# Patient Record
Sex: FEMALE | Race: WHITE | Employment: OTHER | URBAN - METROPOLITAN AREA
[De-identification: names, ages, dates, MRNs, and addresses within clinical notes are randomized per-mention and may not be internally consistent; named-entity substitution may affect disease eponyms.]

---

## 2018-05-01 ENCOUNTER — NURSING HOME VISIT (OUTPATIENT)
Dept: GERIATRICS | Facility: CLINIC | Age: 83
End: 2018-05-01
Payer: COMMERCIAL

## 2018-05-01 VITALS
TEMPERATURE: 98.6 F | WEIGHT: 103.2 LBS | BODY MASS INDEX: 18.99 KG/M2 | HEIGHT: 62 IN | HEART RATE: 83 BPM | OXYGEN SATURATION: 99 % | RESPIRATION RATE: 19 BRPM | DIASTOLIC BLOOD PRESSURE: 69 MMHG | SYSTOLIC BLOOD PRESSURE: 153 MMHG

## 2018-05-01 DIAGNOSIS — I27.23 PULMONARY HYPERTENSION DUE TO CHRONIC OBSTRUCTIVE PULMONARY DISEASE (H): ICD-10-CM

## 2018-05-01 DIAGNOSIS — I10 BENIGN ESSENTIAL HTN: ICD-10-CM

## 2018-05-01 DIAGNOSIS — F41.9 ANXIETY: ICD-10-CM

## 2018-05-01 DIAGNOSIS — R41.89 COGNITIVE IMPAIRMENT: ICD-10-CM

## 2018-05-01 DIAGNOSIS — J44.9 CHRONIC OBSTRUCTIVE PULMONARY DISEASE, UNSPECIFIED COPD TYPE (H): Primary | ICD-10-CM

## 2018-05-01 DIAGNOSIS — J44.9 PULMONARY HYPERTENSION DUE TO CHRONIC OBSTRUCTIVE PULMONARY DISEASE (H): ICD-10-CM

## 2018-05-01 DIAGNOSIS — I50.32 CHRONIC DIASTOLIC CONGESTIVE HEART FAILURE (H): ICD-10-CM

## 2018-05-01 DIAGNOSIS — R53.81 PHYSICAL DECONDITIONING: ICD-10-CM

## 2018-05-01 PROBLEM — R06.00 DYSPNEA AND RESPIRATORY ABNORMALITIES: Status: ACTIVE | Noted: 2018-04-18

## 2018-05-01 PROBLEM — R79.89 ELEVATED BRAIN NATRIURETIC PEPTIDE (BNP) LEVEL: Status: ACTIVE | Noted: 2018-04-02

## 2018-05-01 PROBLEM — E87.1 HYPONATREMIA: Status: ACTIVE | Noted: 2018-04-16

## 2018-05-01 PROBLEM — I50.33 ACUTE ON CHRONIC DIASTOLIC CHF (CONGESTIVE HEART FAILURE) (H): Status: ACTIVE | Noted: 2018-04-27

## 2018-05-01 PROBLEM — E55.9 VITAMIN D DEFICIENCY: Status: ACTIVE | Noted: 2017-12-06

## 2018-05-01 PROBLEM — J96.21 ACUTE ON CHRONIC RESPIRATORY FAILURE WITH HYPOXEMIA (H): Status: ACTIVE | Noted: 2018-04-27

## 2018-05-01 PROBLEM — R79.89 SERUM CREATININE RAISED: Status: ACTIVE | Noted: 2018-04-02

## 2018-05-01 PROBLEM — R06.89 DYSPNEA AND RESPIRATORY ABNORMALITIES: Status: ACTIVE | Noted: 2018-04-18

## 2018-05-01 PROCEDURE — 99310 SBSQ NF CARE HIGH MDM 45: CPT | Performed by: NURSE PRACTITIONER

## 2018-05-01 RX ORDER — TIOTROPIUM BROMIDE 18 UG/1
18 CAPSULE ORAL; RESPIRATORY (INHALATION) DAILY
COMMUNITY

## 2018-05-01 RX ORDER — BUDESONIDE 0.5 MG/2ML
21 INHALANT ORAL 2 TIMES DAILY
COMMUNITY
End: 2018-05-02

## 2018-05-01 RX ORDER — ALBUTEROL SULFATE 90 UG/1
2 AEROSOL, METERED RESPIRATORY (INHALATION) EVERY 4 HOURS PRN
COMMUNITY

## 2018-05-01 RX ORDER — POTASSIUM CHLORIDE 750 MG/1
10 TABLET, EXTENDED RELEASE ORAL DAILY
COMMUNITY
End: 2019-08-04

## 2018-05-03 ENCOUNTER — TRANSFERRED RECORDS (OUTPATIENT)
Dept: HEALTH INFORMATION MANAGEMENT | Facility: CLINIC | Age: 83
End: 2018-05-03

## 2018-05-03 LAB
ANION GAP SERPL CALCULATED.3IONS-SCNC: 9 MMOL/L (ref 3–14)
BUN SERPL-MCNC: 15 MG/DL (ref 7–30)
CALCIUM SERPL-MCNC: 9.3 MG/DL (ref 8.5–10.1)
CHLORIDE SERPLBLD-SCNC: 88 MMOL/L (ref 94–109)
CO2 SERPL-SCNC: 30 MMOL/L (ref 20–32)
CREAT SERPL-MCNC: 1.1 MG/DL (ref 0.52–1.04)
ERYTHROCYTE [DISTWIDTH] IN BLOOD BY AUTOMATED COUNT: 12.7 % (ref 10–15)
GFR SERPL CREATININE-BSD FRML MDRD: 47 ML/MIN/1.7M2
GLUCOSE SERPL-MCNC: 90 MG/DL (ref 70–99)
HCT VFR BLD AUTO: 34.3 % (ref 35–47)
HEMOGLOBIN: 11.3 G/DL (ref 11.7–15.7)
MCH RBC QN AUTO: 28.6 PG (ref 26.5–33)
MCHC RBC AUTO-ENTMCNC: 32.9 G/DL (ref 31.5–36.5)
MCV RBC AUTO: 87 FL (ref 78–100)
PLATELET # BLD AUTO: 267 10E9/L (ref 150–450)
POTASSIUM SERPL-SCNC: 4.3 MMOL/L (ref 3.4–5.3)
RBC # BLD AUTO: 3.95 10E12/L (ref 3.8–5.2)
SODIUM SERPL-SCNC: 127 MMOL/L (ref 133–144)
WBC # BLD AUTO: 10.8 10E9/L (ref 4–11)

## 2018-05-04 ENCOUNTER — NURSING HOME VISIT (OUTPATIENT)
Dept: GERIATRICS | Facility: CLINIC | Age: 83
End: 2018-05-04
Payer: COMMERCIAL

## 2018-05-04 VITALS
HEART RATE: 80 BPM | OXYGEN SATURATION: 94 % | DIASTOLIC BLOOD PRESSURE: 77 MMHG | TEMPERATURE: 99.1 F | RESPIRATION RATE: 18 BRPM | SYSTOLIC BLOOD PRESSURE: 147 MMHG

## 2018-05-04 DIAGNOSIS — R53.81 PHYSICAL DECONDITIONING: Primary | ICD-10-CM

## 2018-05-04 DIAGNOSIS — I10 BENIGN ESSENTIAL HTN: ICD-10-CM

## 2018-05-04 DIAGNOSIS — J44.9 CHRONIC OBSTRUCTIVE PULMONARY DISEASE, UNSPECIFIED COPD TYPE (H): ICD-10-CM

## 2018-05-04 DIAGNOSIS — I48.20 CHRONIC ATRIAL FIBRILLATION (H): ICD-10-CM

## 2018-05-04 DIAGNOSIS — I50.32 CHRONIC DIASTOLIC CONGESTIVE HEART FAILURE (H): ICD-10-CM

## 2018-05-04 DIAGNOSIS — R41.89 COGNITIVE IMPAIRMENT: ICD-10-CM

## 2018-05-04 PROCEDURE — 99305 1ST NF CARE MODERATE MDM 35: CPT | Performed by: INTERNAL MEDICINE

## 2018-05-04 NOTE — MR AVS SNAPSHOT
"              After Visit Summary   5/4/2018    Melissa Alfaro    MRN: [id number]           Patient Information     Date Of Birth          12/3/1933        Visit Information        Provider Department      5/4/2018 6:00 AM Rom Humphreys MD [facility]        Today's Diagnoses     Physical deconditioning    -  1    Chronic diastolic congestive heart failure (H)        Chronic obstructive pulmonary disease, unspecified COPD type (H)        Chronic atrial fibrillation (H)        Benign essential HTN        Cognitive impairment           Follow-ups after your visit        Who to contact     If you have questions or need follow up information about today's clinic visit or your schedule please contact [facility] directly at [phone].  Normal or non-critical lab and imaging results will be communicated to you by MyChart, letter or phone within 4 business days after the clinic has received the results. If you do not hear from us within 7 days, please contact the clinic through MyChart or phone. If you have a critical or abnormal lab result, we will notify you by phone as soon as possible.  Submit refill requests through [employer] or call your pharmacy and they will forward the refill request to us. Please allow 3 business days for your refill to be completed.          Additional Information About Your Visit        MyChart Information     [employer] lets you send messages to your doctor, view your test results, renew your prescriptions, schedule appointments and more. To sign up, go to [email or web address] . Click on \"Log in\" on the left side of the screen, which will take you to the Welcome page. Then click on \"Sign up Now\" on the right side of the page.     You will be asked to enter the access code listed below, as well as some personal information. Please follow the directions to create your username and password.     Your access code is: [email or web address]  Expires: 8/2/2018 10:45 " AM     Your access code will  in 90 days. If you need help or a new code, please call your [facility] clinic or [phone].        Care EveryWhere ID     This is your Care EveryWhere ID. This could be used by other organizations to access your [facility] medical records  [id number]        Your Vitals Were     Pulse Temperature Respirations Pulse Oximetry          80 99.1  F (37.3  C) 18 94%         Blood Pressure from Last 3 Encounters:   18 147/77   18 153/69    Weight from Last 3 Encounters:   18 103 lb 3.2 oz (46.8 kg)              Today, you had the following     No orders found for display       Primary Care Provider Office Phone # Fax #    Sudheer Tariq [phone]       [address] [address]        Equal Access to Services     JOEY MURRELL : Hadii aad ku hadasho Soomaali, waaxda luqadaha, qaybta kaalmada adeegyada, bradley ruffinin hayaan avtar pascal . So [facility] [phone].    ATENCIÓN: Si habla español, tiene a rene disposición servicios gratuitos de asistencia lingüística. Llame al [phone].    We comply with applicable federal civil rights laws and [place] laws. We do not discriminate on the basis of race, color, national origin, age, disability, sex, sexual orientation, or gender identity.            Thank you!     Thank you for choosing [facility]  for your care. Our goal is always to provide you with excellent care. Hearing back from our patients is one way we can continue to improve our services. Please take a few minutes to complete the written survey that you may receive in the mail after your visit with us. Thank you!             Your Updated Medication List - Protect others around you: Learn how to safely use, store and throw away your medicines at www.disposemymeds.org.          This list is accurate as of 18 10:45 AM.  Always use your most recent med list.                   Brand Name  Dispense Instructions for use Diagnosis    ACETAMINOPHEN PO      Take 325-650 mg by mouth every 6 hours as needed for pain        albuterol 108 (90 Base) MCG/ACT Inhaler    PROAIR HFA/PROVENTIL HFA/VENTOLIN HFA     Inhale 1-2 puffs into the lungs every 4 hours as needed for shortness of breath / dyspnea or wheezing        AMLODIPINE BESYLATE PO      Take 10 mg by mouth daily        ATENOLOL PO      Take 50 mg by mouth daily        ATIVAN PO      Take 0.5 mg by mouth 2 times daily as needed for anxiety        FUROSEMIDE PO      Take 20 mg by mouth daily        potassium chloride 10 MEQ tablet    K-TAB,KLOR-CON     Take 10 mEq by mouth daily        tiotropium 18 MCG capsule    SPIRIVA     Inhale 18 mcg into the lungs daily

## 2018-05-04 NOTE — PROGRESS NOTES
PRIMARY CARE PROVIDER AND CLINIC RESPONSIBLE:  Sudheer Tariq, [facility] [address] [facility]*        ADMISSION HISTORY AND PHYSICAL EXAMINATION     Chief Complaint   Patient presents with     Fatigue     Shortness of Breath         HISTORY OF PRESENT ILLNESS:  84 year old female, (12/3/1933), [facility] of medical care and rehab.  HPI information obtained from: facility chart records, facility staff, patient report, [facility] chart review and Care Everywhere [facility] chart review.    Melissa Alfaro is a 84 year old female with history of hypertension, chronic atrial fibrillation, history of chronic diastolic congestive heart failure, COPD, hyperlipidemia, sleep pulmonary hypertension, anxiety, cognitive impairment, anxiety and panic attacks who was admitted at [facility] from 4/27 to 4/28/18 due to shortness of breath.  She was recently admitted at the same hospital from 4/16 through 4/18/2018 for COPD and CHF exacerbation.  She was treated with diuretics, oxygen, steroids, and other home medications.  Echo on 4/17/2018 showed EF 65-70%, with severe diastolic dysfunction, moderate severe left atrial enlargement, mild mitral valve regurgitation.  She was discharged with home oxygen use at night due to hypoxia.  At home she was short of breath and anxious as she was not using her Pulmicort and restarted her Spiriva.  Patient recently forget fullness as per daughter.  Patient lives alone.  She feels weak and tired, she is short of breath on exertion.  She is using oxygen 24 7.  Has no leg swelling.  She denies chest pains. Slept well, appetite good and had BM. Patient denies chest pain, abdominal pain, n&v, fever, chills, dysuria, leg pain or swelling. The rest of ROS is unremarkable. Patient is seen and examined by me with José Miguel Valdez NP. Please see SAMSON Valdez's admit noted dated 5/1/18 for details of admission, past medical history,  family history, allergies, medication list, social history and other details pertinent with this admission. Hospital admission and dc summary reviewed.    Past Medical History:   Diagnosis Date     Anxiety      Benign neoplasm of colon      Carotid stenosis, asymptomatic      COPD (chronic obstructive pulmonary disease) (H)      Corns and callosities      Dermatophytosis of nail      Essential hypertension      History of atrial fibrillation      Hyperlipidemia      Hyperlipidemia      Insomnia      Irritable bowel syndrome      Nocturia      Plantar fascial fibromatosis      Primary localized osteoarthrosis of the hip      Pulmonary hypertension      Sprain of lumbar region      Vitamin D deficiency      VSD (ventricular septal defect and aortic arch hypoplasia        Past Surgical History:   Procedure Laterality Date     APPENDECTOMY       AS TOTAL HIP ARTHROPLASTY         Current Outpatient Prescriptions   Medication Sig     ACETAMINOPHEN PO Take 325-650 mg by mouth every 6 hours as needed for pain     albuterol (PROAIR HFA/PROVENTIL HFA/VENTOLIN HFA) 108 (90 Base) MCG/ACT Inhaler Inhale 1-2 puffs into the lungs every 4 hours as needed for shortness of breath / dyspnea or wheezing     AMLODIPINE BESYLATE PO Take 10 mg by mouth daily     ATENOLOL PO Take 50 mg by mouth daily     FUROSEMIDE PO Take 20 mg by mouth daily     LORazepam (ATIVAN PO) Take 0.5 mg by mouth 2 times daily as needed for anxiety     potassium chloride (K-TAB,KLOR-CON) 10 MEQ tablet Take 10 mEq by mouth daily     tiotropium (SPIRIVA) 18 MCG capsule Inhale 18 mcg into the lungs daily      No current facility-administered medications for this visit.        Allergies   Allergen Reactions     No Clinical Screening - See Comments Shortness Of Breath     With some mold spores  Other reaction(s): Other  Heart races with antihistamines     Diphtheria-Tetanus Toxoids      Other reaction(s): Other  Red swollen arm     Epinephrine      Other reaction(s):  Other  Heart rate increases & her body shakes & trembles     Penicillin G      Other reaction(s): Other (Specify in Comments)       Social History     Social History     Marital status: Single     Spouse name: N/A     Number of children: N/A     Years of education: N/A     Occupational History     Not on file.     Social History Main Topics     Smoking status: Not on file     Smokeless tobacco: Not on file     Alcohol use Not on file     Drug use: Not on file     Sexual activity: Not on file     Other Topics Concern     Not on file     Social History Narrative     No narrative on file          Information reviewed:  Medications, vital signs, orders, nursing notes, problem list, hospital information.     ROS: All 10 point review of system completed, those pertinent positive, please see H&P, the remaining ROS is negative.    /77  Pulse 80  Temp 99.1  F (37.3  C)  Resp 18  SpO2 94%    PHYSICAL EXAMINATION:   GENERAL:  No acute distress.  SKIN:  Dry and warm.  There is no rash at area of skin examined.  HEENT:  Head without trauma.  Pupils round, reactive. Exam of conjunctiva and lids are normal. Sclera without icterus. There is no oral thrush.  NECK:  Supple. No jugular venous distension.  CHEST: No reproducible chest tenderness.   LUNGS:  Normal respiratory effort. Lungs reveal decreased breath sounds at bases. No rales but few ronchi and wheezes.  HEART:  Regular rate and rhythm.  No murmur, gallops or rubs auscultated.  ABDOMEN:  Soft, bowel sounds positive.  There is no tenderness or guarding.   EXTREMITIES: No edema.   NEUROLOGIC:  Alert and oriented x3. Moving all ext. Gait not tested.  PSYCH: Recent and remote memory is normal. Mood and affect are normal.    Lab/Diagnostic data:  Reviewed    CBC Lab Results (Last 5 results in 365 days)       WBC RBC Hgb Hct MCV MCH MCHC RDW Plt Neut # Lymph # Eos # Baso # Immat. Gran #   05/03/18 0000 10.8 3.95 11.3 34.3 87 28.6 32.9 12.7 267 -- -- -- -- --   CMP Labs  (Last 5 results in 365 days)       Na+ K+ Cl CO2 ANION GAP Glc BUN Creat GFR GFR-Black Calcium Mg ALT AST A1C TSH LDL HIV   05/03/18 0000 127 4.3 88 30 9 90 15 1.10 47 57               [facility] labs:  CHEMISTRY 8 TEST PANEL (04/28/2018 6:45 AM)  Only the most recent of 2 results within the time period is included.         CHEMISTRY 8 TEST PANEL (04/28/2018 6:45 AM)   Component Value Ref Range   GLUCOSE 89 70 - 105 MG/DL   BLOOD UREA NITROGEN 14.0 7 - 26 MG/DL   CREATININE 1.08 (H) 0.55 - 1.02 MG/DL   GFR, EST (OTHER RACES) 48 (L)  Comment:   REFERENCE RANGE:  NORMAL  >60 ml/min/1.73m`2  GFR REFERENCE RANGE IS NOT ESTABLISHED IN PATIENTS >70 YRS    >60   GFR,EST() 58 (L)  Comment:   REFERENCE RANGE:  NORMAL  >60 ml/min/1.73m`2  GFR REFERENCE RANGE IS NOT ESTABLISHED IN PATIENTS >70 YRS    >60   BUN/CREAT RATIO 13.0 10 - 30   CO2 32.0 (H) 20 - 31 MMOL/L   CHLORIDE 93 (L) 98 - 107 MMOL/L   SODIUM 135 (L) 136 - 145 MMOL/L   POTASSIUM 4.0 3.5 - 5.1 MMOL/L   CALCIUM 9.3 8.4 - 10.2 MG/DL   ANION GAP 14.0 6 - 14   CALCULATED OSMO 279.9 278 - 308 MOSM/KG         CBC WITH DIFFERENTIAL AND PLATELET COUNT (04/27/2018 8:23 AM)       CBC WITH DIFFERENTIAL AND PLATELET COUNT (04/27/2018 8:23 AM)   Component Value Ref Range   WBC COUNT 10.0 4.5 - 11.0 K/UL   RBC COUNT 4.04 4.00 - 5.20 M/UL   HEMOGLOBIN 11.8 (L) 12.0 - 16.0 GM/DL   HEMATOCRIT 35.0 33 - 51 %   MCV 86.6 80 - 100 FL   MCH 29.2 26 - 34 PG   MCHC 33.7 32 - 36 %   RDW 12.7 11.5 - 13.1 %   PLATELETS 347 150 - 450 K/UL   MPV 8.4 6.5 - 10.0 FL   DIFF TYPE AUTOMATED DIFFERENTIAL      % NEUTRO 76.2 35 - 77 %   % LYMPHS 8.1 (L) 24 - 44 %   % MONOCYTE 12.3 (H) 3 - 6 %   % EOS 2.5 0 - 3 %   % BASO 0.3 0 - 1 %   % IMMATURE GRAN 0.6 0.0 - 1.1 %   ABS NEUTROPHIL (ANC) 7.6 1.5 - 8.5 K/UL   ABS LYMPH 0.8 (L) 1.1 - 5.5 K/UL   ABS MONOCYTE 1.2 (H) 0.1 - 0.7 K/UL   ABS EOS 0.3 0.0 - 0.3 K/UL   ABS BASO 0.0 0.0 - 0.1 K/UL   ABS IMMATURE GRAN 0.1 0.00 - 0.14 K/UL    IMMATURE PLATELET FRACTION 0.0 0.0 - 7.2 %   NRBC ABSOLUTE COUNT 0.00 0.00 - 0.72 K/UL   % NUCLEATED RBC 0.0 0 %          ASSESSMENT / PLAN:     Physical deconditioning  Plan: PT/OT with increase independence, self-care, strength and endurance and other cares that help return to home/facility of origin, fall precautions. Care conference with patient and family for the progress of rehab and disposition issues will be discussed as planned. Rehab evaluation and other evaluations including CPT are at rehab logs, to be reviewed separately.  Fall risk assessment as well as cognitive evaluation will be formed during rehab stay if indicated.    Chronic diastolic congestive heart failure (H)  Plan: continue current medications Lasix, atenolol, Norvasc, monitor I&O and daily weighs and labs if indicated. Follow up cardiologist and PCP as scheduled.    Chronic obstructive pulmonary disease, unspecified COPD type (H)  Plan: Continue Spiriva, oxygen and albuterol inhalers.  Consider starting corticosteroid inhalers if needed.  She is on Ativan for anxiety related to COPD.    Chronic atrial fibrillation (H)  Plan: Continue Tylenol.  She is not on anticoagulation or aspirin, consider starting aspirin 81 mg daily if no contraindication.    Benign essential HTN  Plan: Continue Tylenol and Norvasc with parameters. Blood pressure stable.    Cognitive impairment  Plan: Fall and delirium prevention.    Other problems with same care. Primary care doctor and other specialists to address those chronic problems in next clinic appointment to be scheduled upon discharge from the TCU.      Total time spent with patient visit was 35 min including patient visit, review of past records, patients counseling and coordinating care.        Rom Humphreys MD

## 2018-05-05 ENCOUNTER — TRANSFERRED RECORDS (OUTPATIENT)
Dept: HEALTH INFORMATION MANAGEMENT | Facility: CLINIC | Age: 83
End: 2018-05-05

## 2018-05-05 LAB
ANION GAP SERPL CALCULATED.3IONS-SCNC: 9 MMOL/L (ref 3–14)
BUN SERPL-MCNC: 28 MG/DL (ref 7–30)
CALCIUM SERPL-MCNC: 9 MG/DL (ref 8.5–10.1)
CHLORIDE SERPLBLD-SCNC: 89 MMOL/L (ref 94–109)
CO2 SERPL-SCNC: 29 MMOL/L (ref 20–32)
CREAT SERPL-MCNC: 1.25 MG/DL (ref 0.52–1.04)
GFR SERPL CREATININE-BSD FRML MDRD: 41 ML/MIN/1.73M2
GLUCOSE SERPL-MCNC: 108 MG/DL (ref 70–99)
POTASSIUM SERPL-SCNC: 4.4 MMOL/L (ref 3.4–5.3)
SODIUM SERPL-SCNC: 127 MMOL/L (ref 133–144)

## 2018-05-07 ENCOUNTER — TRANSFERRED RECORDS (OUTPATIENT)
Dept: HEALTH INFORMATION MANAGEMENT | Facility: CLINIC | Age: 83
End: 2018-05-07

## 2018-05-07 LAB
ANION GAP SERPL CALCULATED.3IONS-SCNC: 6 MMOL/L (ref 3–14)
BUN SERPL-MCNC: 16 MG/DL (ref 7–30)
CALCIUM SERPL-MCNC: 9 MG/DL (ref 8.5–10.1)
CHLORIDE SERPLBLD-SCNC: 94 MMOL/L (ref 94–109)
CO2 SERPL-SCNC: 31 MMOL/L (ref 20–32)
CREAT SERPL-MCNC: 1.26 MG/DL (ref 0.52–1.04)
GFR SERPL CREATININE-BSD FRML MDRD: 40 ML/MIN/1.73M2
GLUCOSE SERPL-MCNC: 77 MG/DL (ref 70–99)
POTASSIUM SERPL-SCNC: 4 MMOL/L (ref 3.4–5.3)
SODIUM SERPL-SCNC: 131 MMOL/L (ref 133–144)

## 2018-05-08 ENCOUNTER — NURSING HOME VISIT (OUTPATIENT)
Dept: GERIATRICS | Facility: CLINIC | Age: 83
End: 2018-05-08
Payer: COMMERCIAL

## 2018-05-08 VITALS
WEIGHT: 106 LBS | BODY MASS INDEX: 19.39 KG/M2 | SYSTOLIC BLOOD PRESSURE: 150 MMHG | DIASTOLIC BLOOD PRESSURE: 59 MMHG | HEART RATE: 87 BPM | OXYGEN SATURATION: 93 % | TEMPERATURE: 98.4 F | RESPIRATION RATE: 18 BRPM

## 2018-05-08 DIAGNOSIS — I50.32 CHRONIC DIASTOLIC CONGESTIVE HEART FAILURE (H): ICD-10-CM

## 2018-05-08 DIAGNOSIS — R53.81 PHYSICAL DECONDITIONING: ICD-10-CM

## 2018-05-08 DIAGNOSIS — Z86.79 HISTORY OF ATRIAL FIBRILLATION: ICD-10-CM

## 2018-05-08 DIAGNOSIS — I10 HYPERTENSION, BENIGN: ICD-10-CM

## 2018-05-08 DIAGNOSIS — R41.89 COGNITIVE IMPAIRMENT: ICD-10-CM

## 2018-05-08 DIAGNOSIS — E87.1 HYPONATREMIA: ICD-10-CM

## 2018-05-08 DIAGNOSIS — J44.9 CHRONIC OBSTRUCTIVE PULMONARY DISEASE, UNSPECIFIED COPD TYPE (H): Primary | ICD-10-CM

## 2018-05-08 PROCEDURE — 99309 SBSQ NF CARE MODERATE MDM 30: CPT | Performed by: NURSE PRACTITIONER

## 2018-05-08 NOTE — PROGRESS NOTES
[facility]    Chief Complaint   Patient presents with     RYDER       [facility] Medical Record Number:  [id number]    HPI:    Melissa Alfaro is a 84 year old  (12/3/1933), who [facility]  HPI information obtained from: facility chart records, facility staff, patient report, Care Everywhere Epic chart review.   She came to this facility 4/28/2018 for short term rehab and medical management following 2 recent hospitalizations.   She was initially hospitalized at [facility] 4/16-4/21/2018 with COPD and CHF exacerbation. ECHO 4/17/2018: EF 65-70%, severe diastolic dysfunction, moderate to severe left atrial enlargement, mild mitral valve regurgitation. She was diuresed. Discharged home on O2, which was new. She returned to the ED 4/27 with increased shortness of breath and inability to manage at home. She was hypoxic on room air. CXR showed small left pleural effusion, too small to tap. She was given lasix IV X 1. Pulmicort was started. She discharged to this facility to be closer to family.      Current issues are:          Chronic obstructive pulmonary disease, unspecified COPD type (H)-reports her breathing has improved and she's feeling better. Pulmicort was discontinued as it caused anxiety and she feels this was helpful.   Chronic diastolic congestive heart failure (H)-weight is up 3 lbs, but appetite improved. No LE edema  Hypertension-BPs: 150/59, 147/75, 139/61    History of atrial fibrillation-HR: 80-91  Hyponatremia-fluid restriction started 5/3/2018 for Na 127 with improvement  Cognitive impairment-she's more alert and interactive today. Speech therapist reports moderate deficits in memory, mild deficits in attention and problem solving.   The on call MD was called with increased confusion 5/6/2018. UA showed possible infection and Bactrim was started. UC showed no growth and Bactrim discontinued 5/7/2018. She denies  urinary symptoms.   Physical deconditioning-ambulating 250 ft with walker and supervision. Able to do 6 stairs with handrails and stand by assist. Requires supervision to stand by assist with cares.      ALLERGIES: No clinical screening - see comments; Diphtheria-tetanus toxoids; Epinephrine; and Penicillin g  Past Medical, Surgical, Family and Social History reviewed and updated in [facility].    Current Outpatient Prescriptions   Medication Sig Dispense Refill     ACETAMINOPHEN PO Take 325-650 mg by mouth every 6 hours as needed for pain       albuterol (PROAIR HFA/PROVENTIL HFA/VENTOLIN HFA) 108 (90 Base) MCG/ACT Inhaler Inhale 1-2 puffs into the lungs every 4 hours as needed for shortness of breath / dyspnea or wheezing       AMLODIPINE BESYLATE PO Take 10 mg by mouth daily       ASPIRIN EC PO Take 81 mg by mouth daily       ATENOLOL PO Take 50 mg by mouth daily       FUROSEMIDE PO Take 20 mg by mouth daily       LORazepam (ATIVAN PO) Take 0.5 mg by mouth 2 times daily as needed for anxiety       potassium chloride (K-TAB,KLOR-CON) 10 MEQ tablet Take 10 mEq by mouth daily       tiotropium (SPIRIVA) 18 MCG capsule Inhale 18 mcg into the lungs daily        Medications reviewed:  Medications reconciled to facility chart and changes were made to reflect current medications as identified as above med list. Below are the changes that were made:   Medications stopped since last EPIC medication reconciliation:   There are no discontinued medications.    Medications started since last [facility] medication reconciliation:  Orders Placed This Encounter   Medications     ASPIRIN EC PO     Sig: Take 81 mg by mouth daily       REVIEW OF SYSTEMS:  4 point ROS including Respiratory, CV, GI and , other than that noted in the HPI,  is negative    Physical Exam:  /59  Pulse 87  Temp 98.4  F (36.9  C)  Resp 18  Wt 106 lb (48.1 kg)  SpO2 93%  BMI 19.39 kg/m2  GENERAL APPEARANCE:  Alert, in no distress, thin  ENT:  Mouth and  posterior oropharynx normal, moist mucous membranes, [place]  EYES:  EOM normal, conjunctiva and lids normal  NECK:  No adenopathy,masses or thyromegaly  RESP:  respiratory effort and palpation of chest normal, no respiratory distress, diminished breath sounds bilaterally, no wheezes  CV:  Palpation and auscultation of heart done , regular rate and rhythm, no murmur, no edema, +2 pedal pulses  ABDOMEN:   soft, nontender, no hepatosplenomegaly or other masses  M/S:  Gait steady with walker.  ALCANTARA with good strength. No joint inflammation  SKIN:  no rashes or open areas  PSYCH:  oriented X3,  insight and judgement impaired, memory impaired, affect normal    Recent Labs:   Last Basic Metabolic Panel:  Lab Results   Component Value Date     05/07/2018      Lab Results   Component Value Date    POTASSIUM 4.0 05/07/2018     Lab Results   Component Value Date    CHLORIDE 94 05/07/2018     Lab Results   Component Value Date    LUIS ENRIQUE 9.0 05/07/2018     Lab Results   Component Value Date    CO2 31 05/07/2018     Lab Results   Component Value Date    BUN 16 05/07/2018     Lab Results   Component Value Date    CR 1.26 05/07/2018     Lab Results   Component Value Date    GLC 77 05/07/2018     Lab Results   Component Value Date    WBC 10.8 05/03/2018     Lab Results   Component Value Date    RBC 3.95 05/03/2018     Lab Results   Component Value Date    HGB 11.3 05/03/2018     Lab Results   Component Value Date    HCT 34.3 05/03/2018     Lab Results   Component Value Date    MCV 87 05/03/2018     Lab Results   Component Value Date    MCH 28.6 05/03/2018     Lab Results   Component Value Date    MCHC 32.9 05/03/2018     Lab Results   Component Value Date    RDW 12.7 05/03/2018     Lab Results   Component Value Date     05/03/2018     ASSESSMENT / PLAN:  (J44.9) Chronic obstructive pulmonary disease, unspecified COPD type (H)  (primary encounter diagnosis)  Comment: respiratory status improving and has been able to come off O2  at times. Anxiety has improved off Pulmicort neb  Plan: continue Spiriva, prn albuterol. Wean O2 as tolerated.     (I50.32) Chronic diastolic congestive heart failure (H)  Comment: compensated  Plan: continue lasix. Daily weight. [facility]    (I10) Hypertension, benign  Comment: controlled  Plan: continue amlodipine, atenolol, lasix. Monitor VS.    (Z86.79) History of atrial fibrillation  Comment: rate controlled  Plan: continue atenolol, ASA    (E87.1) Hyponatremia  Comment: improved  Plan: recheck [facility] 5/10/2018. Continue fluid restriction.     (R41.89) Cognitive impairment  Comment: mild to moderate deficits. No longer safe to live alone without assistance.   Plan: supportive care    (R53.81) Physical deconditioning  Comment: progressing in therapies  Plan: continue PHYSICAL THERAPY/OT. Disposition unclear at this time. Care conference is scheduled for later this week.         Electronically signed by  TONY Burciaga CNP

## 2018-05-10 ENCOUNTER — TRANSFERRED RECORDS (OUTPATIENT)
Dept: HEALTH INFORMATION MANAGEMENT | Facility: CLINIC | Age: 83
End: 2018-05-10

## 2018-05-10 LAB
ANION GAP SERPL CALCULATED.3IONS-SCNC: 5 MMOL/L (ref 3–14)
BUN SERPL-MCNC: 18 MG/DL (ref 7–30)
CALCIUM SERPL-MCNC: 9.2 MG/DL (ref 8.5–10.1)
CHLORIDE SERPLBLD-SCNC: 93 MMOL/L (ref 94–109)
CO2 SERPL-SCNC: 33 MMOL/L (ref 20–32)
CREAT SERPL-MCNC: 1.28 MG/DL (ref 0.52–1.04)
GFR SERPL CREATININE-BSD FRML MDRD: 40 ML/MIN/1.73M2
GLUCOSE SERPL-MCNC: 94 MG/DL (ref 70–99)
POTASSIUM SERPL-SCNC: 3.7 MMOL/L (ref 3.4–5.3)
SODIUM SERPL-SCNC: 131 MMOL/L (ref 133–144)

## 2018-05-18 ENCOUNTER — DISCHARGE SUMMARY NURSING HOME (OUTPATIENT)
Dept: GERIATRICS | Facility: CLINIC | Age: 83
End: 2018-05-18
Payer: COMMERCIAL

## 2018-05-18 VITALS
SYSTOLIC BLOOD PRESSURE: 125 MMHG | DIASTOLIC BLOOD PRESSURE: 60 MMHG | BODY MASS INDEX: 18.77 KG/M2 | RESPIRATION RATE: 18 BRPM | WEIGHT: 102.6 LBS | OXYGEN SATURATION: 94 % | TEMPERATURE: 97.9 F | HEART RATE: 77 BPM

## 2018-05-18 DIAGNOSIS — R53.81 PHYSICAL DECONDITIONING: ICD-10-CM

## 2018-05-18 DIAGNOSIS — I48.0 PAROXYSMAL ATRIAL FIBRILLATION (H): ICD-10-CM

## 2018-05-18 DIAGNOSIS — F41.9 ANXIETY: ICD-10-CM

## 2018-05-18 DIAGNOSIS — J44.9 CHRONIC OBSTRUCTIVE PULMONARY DISEASE, UNSPECIFIED COPD TYPE (H): Primary | ICD-10-CM

## 2018-05-18 DIAGNOSIS — R41.89 COGNITIVE IMPAIRMENT: ICD-10-CM

## 2018-05-18 DIAGNOSIS — E87.1 HYPONATREMIA: ICD-10-CM

## 2018-05-18 DIAGNOSIS — I50.33 ACUTE ON CHRONIC DIASTOLIC CHF (CONGESTIVE HEART FAILURE) (H): ICD-10-CM

## 2018-05-18 DIAGNOSIS — I10 BENIGN ESSENTIAL HTN: ICD-10-CM

## 2018-05-18 PROCEDURE — 99316 NF DSCHRG MGMT 30 MIN+: CPT | Performed by: NURSE PRACTITIONER

## 2018-05-18 NOTE — PROGRESS NOTES
[facility] DISCHARGE SUMMARY    PATIENT'S NAME: Melissa Alfaro  YOB: 1933  MEDICAL RECORD NUMBER:  [id number]    PRIMARY CARE PROVIDER AND CLINIC RESPONSIBLE AFTER TRANSFER: Sudheer Tariq [facility] [address] [facility] / [facility]    CODE STATUS/ADVANCE DIRECTIVES DISCUSSION:   CPR/Full code        Allergies   Allergen Reactions     No Clinical Screening - See Comments Shortness Of Breath     With some mold spores  Other reaction(s): Other  Heart races with antihistamines     Diphtheria-Tetanus Toxoids      Other reaction(s): Other  Red swollen arm     Epinephrine      Other reaction(s): Other  Heart rate increases & her body shakes & trembles     Penicillin G      Other reaction(s): Other (Specify in Comments)       TRANSFERRING PROVIDERS: TONY Burciaga CNP, Rom Humphreys MD  DATE OF SNF ADMISSION:  April / 28 / 2018  DATE OF SNF (anticipated) DISCHARGE: May / 19 / 2018  DISCHARGE DISPOSITION: Non-FMG Provider   Nursing Facility: [facility] stay 4/27-4/28/2018 and 4/16-4/21/2018.     Condition on Discharge:  Improving.  Cognitive Scores: SLUMS 17/30 and CPT 4.9/5.6    Equipment: walker and no aids    DISCHARGE DIAGNOSIS:   1. Chronic obstructive pulmonary disease, unspecified COPD type (H)    2. Acute on chronic diastolic CHF (congestive heart failure) (H)    3. Paroxysmal atrial fibrillation (H)    4. Anxiety    5. Benign essential HTN    6. Hyponatremia    7. Cognitive impairment    8. Physical deconditioning        HPI Nursing Facility Course:  HPI information obtained from: facility chart records, facility staff, patient report, [facility] chart review and Care Everywhere [facility] chart review.She was initially hospitalized at [facility] 4/16-4/21/2018 with COPD and CHF exacerbation. ECHO 4/17/2018: EF 65-70%, severe diastolic dysfunction, moderate to severe left atrial enlargement,  mild mitral valve regurgitation. She was diuresed. Discharged home on O2, which was new. She returned to the ED 4/27 with increased shortness of breath and inability to manage at home. She was hypoxic on room air. CXR showed small left pleural effusion, too small to tap. She was given lasix IV X 1. Pulmicort was started. She discharged to this facility to be closer to family.     She has worked with PHYSICAL THERAPY and OT with good results. Ambulating 200 ft with walker and supervision. Independent with toileting and dressing. Requires stand by to min assist with bathing. TUG 8.32 seconds, indicating low fall risk.   ASSESSMENT / PLAN:  (J44.9) Chronic obstructive pulmonary disease, unspecified COPD type (H)  (primary encounter diagnosis)  Comment: respiratory status improved. Weaned off O2  Plan: discharge home with her daughter. Continue Spiriva, prn albuterol. Home services and follow up as below.     (I50.33) Acute on chronic diastolic CHF (congestive heart failure) (H)  Comment: compensated. Weight stable 102-106 lbs. Renal function at baseline  Plan: continue lasix. Daily weight. Follow up labs per PCP    (I48.0) Paroxysmal atrial fibrillation (H)  Comment: rate controlled: 67-82  Plan: continue atenolol, ASA    (F41.9) Anxiety  Comment: chronic. Improved after Pulmicort discontinued  Plan: continue prn ativan    (I10) Benign essential HTN  Comment: controlled with BPs:  125/60, 127/78, 131/72    Plan: continue amlodipine, atenolol, lasix. Monitor VS    (E87.1) Hyponatremia  Comment: improved. Fluid restriction discontinued  Plan: follow up labs per PCP    (R41.89) Cognitive impairment  Comment: mild to moderate deficits. Speech therapist notes moderate impairment in executive function. No longer safe to live alone.   Plan: supportive care. Her daughter will be staying with her while family looks for AL.     (R53.81) Physical deconditioning  Comment: progress in therapies as above  Plan: home therapies for  ongoing gait training, strengthening and ADL safety    PAST MEDICAL HISTORY:  has a past medical history of Anxiety; Benign neoplasm of colon; Carotid stenosis, asymptomatic; COPD (chronic obstructive pulmonary disease) (H); Corns and callosities; Dermatophytosis of nail; Essential hypertension; History of atrial fibrillation; Hyperlipidemia; Hyperlipidemia; Insomnia; Irritable bowel syndrome; Nocturia; Plantar fascial fibromatosis; Primary localized osteoarthrosis of the hip; Pulmonary hypertension; Sprain of lumbar region; Vitamin D deficiency; and VSD (ventricular septal defect and aortic arch hypoplasia.    DISCHARGE MEDICATIONS:  Current Outpatient Prescriptions   Medication Sig Dispense Refill     ACETAMINOPHEN PO Take 325-650 mg by mouth every 6 hours as needed for pain       albuterol (PROAIR HFA/PROVENTIL HFA/VENTOLIN HFA) 108 (90 Base) MCG/ACT Inhaler Inhale 1-2 puffs into the lungs every 4 hours as needed for shortness of breath / dyspnea or wheezing       AMLODIPINE BESYLATE PO Take 10 mg by mouth daily       ASPIRIN EC PO Take 81 mg by mouth daily       ATENOLOL PO Take 50 mg by mouth daily       FUROSEMIDE PO Take 20 mg by mouth daily       glycerin-hypromellose- (ARTIFICIAL TEARS) 0.2-0.2-1 % SOLN ophthalmic solution Place 1 drop into both eyes 4 times daily And as needed.       LORazepam (ATIVAN PO) Take 0.5 mg by mouth 2 times daily as needed for anxiety       potassium chloride (K-TAB,KLOR-CON) 10 MEQ tablet Take 10 mEq by mouth daily       tiotropium (SPIRIVA) 18 MCG capsule Inhale 18 mcg into the lungs daily          MEDICATION CHANGES/RATIONALE:   Spiriva changed from every 3 days to daily.   Pulmicort discontinued-caused increased anxiety  ASA started for afib  Controlled medications sent with patient:   Medication: ativan , 28 tabs given to patient at the time of discharge to take home     ROS:    4 point ROS including Respiratory, CV, GI and , other than that noted in the HPI,  is  negative    Physical Exam:   Vitals: /60  Pulse 77  Temp 97.9  F (36.6  C)  Resp 18  Wt 102 lb 9.6 oz (46.5 kg)  SpO2 94%  BMI 18.77 kg/m2  BMI= Body mass index is 18.77 kg/(m^2).    GENERAL APPEARANCE:  Alert, in no distress, thin  ENT:  Mouth and posterior oropharynx normal, moist mucous membranes, [place]  EYES:  EOM normal, conjunctiva and lids normal  NECK:  No adenopathy,masses or thyromegaly  RESP:  respiratory effort and palpation of chest normal, no respiratory distress, diminished breath sounds bilaterally, no wheezes  CV:  Palpation and auscultation of heart done , regular rate and rhythm, no murmur, no edema, +2 pedal pulses  ABDOMEN:   soft, nontender, no hepatosplenomegaly or other masses  M/S:  Gait steady with walker.  ALCANTARA with good strength. No joint inflammation  SKIN:  no rashes or open areas  PSYCH:  oriented X3,  insight and judgement impaired, memory impaired, affect normal    DISCHARGE PLAN:  Occupational Therapy, Physical Therapy, Registered Nurse, Home Health Aide and From:  [facility]  Patient instructed to follow-up with:  PCP in 7 days      Current [place] scheduled appointments:  No future appointments.    MTM referral needed and placed by this provider: No    Pending labs: None  SNF labs   Last Basic Metabolic Panel:  Lab Results   Component Value Date     05/10/2018      Lab Results   Component Value Date    POTASSIUM 3.7 05/10/2018     Lab Results   Component Value Date    CHLORIDE 93 05/10/2018     Lab Results   Component Value Date    LUIS ENRIQUE 9.2 05/10/2018     Lab Results   Component Value Date    CO2 33 05/10/2018     Lab Results   Component Value Date    BUN 18 05/10/2018     Lab Results   Component Value Date    CR 1.28 05/10/2018     Lab Results   Component Value Date    GLC 94 05/10/2018     Lab Results   Component Value Date    WBC 10.8 05/03/2018     Lab Results   Component Value Date    RBC 3.95 05/03/2018     Lab Results   Component  Value Date    HGB 11.3 05/03/2018     Lab Results   Component Value Date    HCT 34.3 05/03/2018     Lab Results   Component Value Date    MCV 87 05/03/2018     Lab Results   Component Value Date    MCH 28.6 05/03/2018     Lab Results   Component Value Date    MCHC 32.9 05/03/2018     Lab Results   Component Value Date    RDW 12.7 05/03/2018     Lab Results   Component Value Date     05/03/2018     Discharge Treatments: None    TOTAL DISCHARGE TIME:   Greater than 30 minutes  Electronically signed by:  TONY Burciaga CNP

## 2018-05-19 NOTE — PROGRESS NOTES
Documentation of Face-to-Face and Certification for Home Health Services     Patient: Melissa Alfaro   YOB: 1933  MR Number: [id number]  Today's Date: 5/18/2018    I certify that patient: Melissa Alfaro is under my care and that I, or a nurse practitioner or physician's assistant working with me, had a face-to-face encounter that meets the physician face-to-face encounter requirements with this patient on: 5/18/2018.    This encounter with the patient was in whole, or in part, for the following medical condition, which is the primary reason for home health care: COPD, CHF.    I certify that, based on my findings, the following services are medically necessary home health services: Nursing, Occupational Therapy and Physical Therapy and HHA    My clinical findings support the need for the above services because: Nurse is needed: To assess VS, respiratory status, weight after changes in medications or other medical regimen., To provide assessment and oversight required in the home to assure adherence to the medical plan due to: complex medication regimen, at risk for rehospitalization. and To provide caregiver training to assist with: med management.., Occupational Therapy Services are needed to assess and treat cognitive ability and address ADL safety due to impairment in functional status and cognition. and Physical Therapy Services are needed to assess and treat the following functional impairments: gait instability, limited endurance.    Further, I certify that my clinical findings support that this patient is homebound (i.e. absences from home require considerable and taxing effort and are for medical reasons or [employer] services or infrequently or of short duration when for other reasons) because: Requires assistance of another person or specialized equipment to access medical services because patient: Is unable to exit home safely on own due to: gait instability, limited endurance, cognitive  impairment. and Is unable to walk greater than 200 feet without rest...    Based on the above findings. I certify that this patient is confined to the home and needs intermittent skilled nursing care, physical therapy and/or speech therapy.  The patient is under my care, and I have initiated the establishment of the plan of care.  This patient will be followed by a physician who will periodically review the plan of care.  Physician/Provider to provide follow up care: Sudheer Tariq    Responsible Medicare certified PECOS Physician: Electronically signed by Dr. Rom Humphreys MD, and only signing for initial order. Please send all follow up questions and concerns or needed follow up signatures to the PCP Sudhere Tariq.    Physician Signature: See electronic signature associated with these discharge orders.  Date: 5/18/2018

## 2019-08-04 ENCOUNTER — APPOINTMENT (OUTPATIENT)
Dept: CT IMAGING | Facility: CLINIC | Age: 84
DRG: 330 | End: 2019-08-04
Attending: EMERGENCY MEDICINE
Payer: COMMERCIAL

## 2019-08-04 ENCOUNTER — HOSPITAL ENCOUNTER (INPATIENT)
Facility: CLINIC | Age: 84
LOS: 10 days | Discharge: NURSING, REHAB OR LONG-TERM CARE | DRG: 330 | End: 2019-08-14
Attending: EMERGENCY MEDICINE | Admitting: INTERNAL MEDICINE
Payer: COMMERCIAL

## 2019-08-04 DIAGNOSIS — D72.829 LEUKOCYTOSIS, UNSPECIFIED TYPE: ICD-10-CM

## 2019-08-04 DIAGNOSIS — K56.609 SMALL BOWEL OBSTRUCTION (H): ICD-10-CM

## 2019-08-04 DIAGNOSIS — I48.0 PAROXYSMAL ATRIAL FIBRILLATION (H): Primary | ICD-10-CM

## 2019-08-04 LAB
ALBUMIN SERPL-MCNC: 3.8 G/DL (ref 3.4–5)
ALBUMIN UR-MCNC: 10 MG/DL
ALP SERPL-CCNC: 58 U/L (ref 40–150)
ALT SERPL W P-5'-P-CCNC: 21 U/L (ref 0–50)
ANION GAP SERPL CALCULATED.3IONS-SCNC: 11 MMOL/L (ref 3–14)
APPEARANCE UR: CLEAR
AST SERPL W P-5'-P-CCNC: 22 U/L (ref 0–45)
BASOPHILS # BLD AUTO: 0 10E9/L (ref 0–0.2)
BASOPHILS NFR BLD AUTO: 0.1 %
BILIRUB SERPL-MCNC: 0.6 MG/DL (ref 0.2–1.3)
BILIRUB UR QL STRIP: NEGATIVE
BUN SERPL-MCNC: 33 MG/DL (ref 7–30)
CALCIUM SERPL-MCNC: 9.6 MG/DL (ref 8.5–10.1)
CHLORIDE SERPL-SCNC: 101 MMOL/L (ref 94–109)
CO2 BLDCOV-SCNC: 25 MMOL/L (ref 21–28)
CO2 SERPL-SCNC: 25 MMOL/L (ref 20–32)
COLOR UR AUTO: YELLOW
CREAT SERPL-MCNC: 1.28 MG/DL (ref 0.52–1.04)
DIFFERENTIAL METHOD BLD: ABNORMAL
EOSINOPHIL # BLD AUTO: 0 10E9/L (ref 0–0.7)
EOSINOPHIL NFR BLD AUTO: 0 %
ERYTHROCYTE [DISTWIDTH] IN BLOOD BY AUTOMATED COUNT: 14.3 % (ref 10–15)
GFR SERPL CREATININE-BSD FRML MDRD: 38 ML/MIN/{1.73_M2}
GLUCOSE BLDC GLUCOMTR-MCNC: 125 MG/DL (ref 70–99)
GLUCOSE BLDC GLUCOMTR-MCNC: 137 MG/DL (ref 70–99)
GLUCOSE SERPL-MCNC: 199 MG/DL (ref 70–99)
GLUCOSE UR STRIP-MCNC: NEGATIVE MG/DL
HCT VFR BLD AUTO: 38.3 % (ref 35–47)
HGB BLD-MCNC: 12.3 G/DL (ref 11.7–15.7)
HGB UR QL STRIP: NEGATIVE
IMM GRANULOCYTES # BLD: 0.1 10E9/L (ref 0–0.4)
IMM GRANULOCYTES NFR BLD: 0.3 %
INTERPRETATION ECG - MUSE: NORMAL
KETONES UR STRIP-MCNC: NEGATIVE MG/DL
LACTATE BLD-SCNC: 2 MMOL/L (ref 0.7–2.1)
LEUKOCYTE ESTERASE UR QL STRIP: ABNORMAL
LIPASE SERPL-CCNC: 131 U/L (ref 73–393)
LYMPHOCYTES # BLD AUTO: 1.7 10E9/L (ref 0.8–5.3)
LYMPHOCYTES NFR BLD AUTO: 6.1 %
MCH RBC QN AUTO: 28.5 PG (ref 26.5–33)
MCHC RBC AUTO-ENTMCNC: 32.1 G/DL (ref 31.5–36.5)
MCV RBC AUTO: 89 FL (ref 78–100)
MONOCYTES # BLD AUTO: 1.9 10E9/L (ref 0–1.3)
MONOCYTES NFR BLD AUTO: 6.8 %
NEUTROPHILS # BLD AUTO: 24.3 10E9/L (ref 1.6–8.3)
NEUTROPHILS NFR BLD AUTO: 86.7 %
NITRATE UR QL: NEGATIVE
PCO2 BLDV: 45 MM HG (ref 40–50)
PH BLDV: 7.35 PH (ref 7.32–7.43)
PH UR STRIP: 5.5 PH (ref 5–7)
PLATELET # BLD AUTO: 377 10E9/L (ref 150–450)
PO2 BLDV: 33 MM HG (ref 25–47)
POTASSIUM SERPL-SCNC: 4.4 MMOL/L (ref 3.4–5.3)
PROT SERPL-MCNC: 7.6 G/DL (ref 6.8–8.8)
RBC # BLD AUTO: 4.31 10E12/L (ref 3.8–5.2)
RBC #/AREA URNS AUTO: 3 /HPF (ref 0–2)
SAO2 % BLDV FROM PO2: 60 %
SODIUM SERPL-SCNC: 137 MMOL/L (ref 133–144)
SOURCE: ABNORMAL
SP GR UR STRIP: 1.02 (ref 1–1.03)
SQUAMOUS #/AREA URNS AUTO: 4 /HPF (ref 0–1)
UROBILINOGEN UR STRIP-MCNC: NORMAL MG/DL (ref 0–2)
WBC # BLD AUTO: 28 10E9/L (ref 4–11)
WBC #/AREA URNS AUTO: 21 /HPF (ref 0–5)

## 2019-08-04 PROCEDURE — 82803 BLOOD GASES ANY COMBINATION: CPT

## 2019-08-04 PROCEDURE — 99285 EMERGENCY DEPT VISIT HI MDM: CPT | Mod: 25

## 2019-08-04 PROCEDURE — 85025 COMPLETE CBC W/AUTO DIFF WBC: CPT | Performed by: EMERGENCY MEDICINE

## 2019-08-04 PROCEDURE — 25000125 ZZHC RX 250: Performed by: INTERNAL MEDICINE

## 2019-08-04 PROCEDURE — 81001 URINALYSIS AUTO W/SCOPE: CPT | Performed by: INTERNAL MEDICINE

## 2019-08-04 PROCEDURE — 83690 ASSAY OF LIPASE: CPT | Performed by: EMERGENCY MEDICINE

## 2019-08-04 PROCEDURE — 12000000 ZZH R&B MED SURG/OB

## 2019-08-04 PROCEDURE — 87088 URINE BACTERIA CULTURE: CPT | Performed by: INTERNAL MEDICINE

## 2019-08-04 PROCEDURE — 80053 COMPREHEN METABOLIC PANEL: CPT | Performed by: EMERGENCY MEDICINE

## 2019-08-04 PROCEDURE — 87186 SC STD MICRODIL/AGAR DIL: CPT | Performed by: INTERNAL MEDICINE

## 2019-08-04 PROCEDURE — 83605 ASSAY OF LACTIC ACID: CPT

## 2019-08-04 PROCEDURE — 25000132 ZZH RX MED GY IP 250 OP 250 PS 637: Performed by: INTERNAL MEDICINE

## 2019-08-04 PROCEDURE — 74177 CT ABD & PELVIS W/CONTRAST: CPT

## 2019-08-04 PROCEDURE — 25000128 H RX IP 250 OP 636: Performed by: EMERGENCY MEDICINE

## 2019-08-04 PROCEDURE — 25000125 ZZHC RX 250

## 2019-08-04 PROCEDURE — 25800030 ZZH RX IP 258 OP 636: Performed by: INTERNAL MEDICINE

## 2019-08-04 PROCEDURE — 87086 URINE CULTURE/COLONY COUNT: CPT | Performed by: INTERNAL MEDICINE

## 2019-08-04 PROCEDURE — 25000125 ZZHC RX 250: Performed by: EMERGENCY MEDICINE

## 2019-08-04 PROCEDURE — 36415 COLL VENOUS BLD VENIPUNCTURE: CPT

## 2019-08-04 PROCEDURE — 87040 BLOOD CULTURE FOR BACTERIA: CPT | Performed by: EMERGENCY MEDICINE

## 2019-08-04 PROCEDURE — 00000146 ZZHCL STATISTIC GLUCOSE BY METER IP

## 2019-08-04 PROCEDURE — 96375 TX/PRO/DX INJ NEW DRUG ADDON: CPT

## 2019-08-04 PROCEDURE — 94640 AIRWAY INHALATION TREATMENT: CPT

## 2019-08-04 PROCEDURE — 25000128 H RX IP 250 OP 636: Performed by: INTERNAL MEDICINE

## 2019-08-04 PROCEDURE — 40000275 ZZH STATISTIC RCP TIME EA 10 MIN

## 2019-08-04 PROCEDURE — 96374 THER/PROPH/DIAG INJ IV PUSH: CPT

## 2019-08-04 PROCEDURE — 99222 1ST HOSP IP/OBS MODERATE 55: CPT | Mod: AI | Performed by: INTERNAL MEDICINE

## 2019-08-04 RX ORDER — AMLODIPINE BESYLATE 10 MG/1
10 TABLET ORAL DAILY
Status: DISCONTINUED | OUTPATIENT
Start: 2019-08-05 | End: 2019-08-08

## 2019-08-04 RX ORDER — SODIUM CHLORIDE 9 MG/ML
1000 INJECTION, SOLUTION INTRAVENOUS CONTINUOUS
Status: DISCONTINUED | OUTPATIENT
Start: 2019-08-04 | End: 2019-08-05

## 2019-08-04 RX ORDER — LIDOCAINE HYDROCHLORIDE 20 MG/ML
10 JELLY TOPICAL ONCE
Status: COMPLETED | OUTPATIENT
Start: 2019-08-04 | End: 2019-08-04

## 2019-08-04 RX ORDER — POTASSIUM CHLORIDE 1500 MG/1
20-40 TABLET, EXTENDED RELEASE ORAL
Status: DISCONTINUED | OUTPATIENT
Start: 2019-08-04 | End: 2019-08-14 | Stop reason: HOSPADM

## 2019-08-04 RX ORDER — ONDANSETRON 2 MG/ML
4 INJECTION INTRAMUSCULAR; INTRAVENOUS EVERY 30 MIN PRN
Status: DISCONTINUED | OUTPATIENT
Start: 2019-08-04 | End: 2019-08-05

## 2019-08-04 RX ORDER — IOPAMIDOL 755 MG/ML
43 INJECTION, SOLUTION INTRAVASCULAR ONCE
Status: COMPLETED | OUTPATIENT
Start: 2019-08-04 | End: 2019-08-04

## 2019-08-04 RX ORDER — ALBUTEROL SULFATE 90 UG/1
1-2 AEROSOL, METERED RESPIRATORY (INHALATION) EVERY 4 HOURS PRN
Status: DISCONTINUED | OUTPATIENT
Start: 2019-08-04 | End: 2019-08-14 | Stop reason: HOSPADM

## 2019-08-04 RX ORDER — ATENOLOL 50 MG/1
50 TABLET ORAL DAILY
COMMUNITY

## 2019-08-04 RX ORDER — POTASSIUM CHLORIDE 1.5 G/1.58G
20-40 POWDER, FOR SOLUTION ORAL
Status: DISCONTINUED | OUTPATIENT
Start: 2019-08-04 | End: 2019-08-14 | Stop reason: HOSPADM

## 2019-08-04 RX ORDER — ONDANSETRON 2 MG/ML
4 INJECTION INTRAMUSCULAR; INTRAVENOUS EVERY 6 HOURS PRN
Status: DISCONTINUED | OUTPATIENT
Start: 2019-08-04 | End: 2019-08-14 | Stop reason: HOSPADM

## 2019-08-04 RX ORDER — HYDROMORPHONE HYDROCHLORIDE 1 MG/ML
.3-.5 INJECTION, SOLUTION INTRAMUSCULAR; INTRAVENOUS; SUBCUTANEOUS
Status: DISCONTINUED | OUTPATIENT
Start: 2019-08-04 | End: 2019-08-14 | Stop reason: HOSPADM

## 2019-08-04 RX ORDER — ARFORMOTEROL TARTRATE 15 UG/2ML
15 SOLUTION RESPIRATORY (INHALATION) 2 TIMES DAILY
Status: DISCONTINUED | OUTPATIENT
Start: 2019-08-04 | End: 2019-08-14 | Stop reason: HOSPADM

## 2019-08-04 RX ORDER — LIDOCAINE HYDROCHLORIDE 20 MG/ML
JELLY TOPICAL
Status: COMPLETED
Start: 2019-08-04 | End: 2019-08-04

## 2019-08-04 RX ORDER — MORPHINE SULFATE 2 MG/ML
2 INJECTION, SOLUTION INTRAMUSCULAR; INTRAVENOUS ONCE
Status: COMPLETED | OUTPATIENT
Start: 2019-08-04 | End: 2019-08-04

## 2019-08-04 RX ORDER — TIOTROPIUM BROMIDE 18 UG/1
18 CAPSULE ORAL; RESPIRATORY (INHALATION) DAILY
Status: DISCONTINUED | OUTPATIENT
Start: 2019-08-04 | End: 2019-08-04

## 2019-08-04 RX ORDER — LIDOCAINE HYDROCHLORIDE 20 MG/ML
SOLUTION OROPHARYNGEAL
Status: DISCONTINUED
Start: 2019-08-04 | End: 2019-08-04 | Stop reason: CLARIF

## 2019-08-04 RX ORDER — AMLODIPINE BESYLATE 10 MG/1
10 TABLET ORAL DAILY
COMMUNITY

## 2019-08-04 RX ORDER — LIDOCAINE HYDROCHLORIDE 20 MG/ML
JELLY TOPICAL
Status: DISCONTINUED
Start: 2019-08-04 | End: 2019-08-04 | Stop reason: HOSPADM

## 2019-08-04 RX ORDER — BUDESONIDE 0.5 MG/2ML
0.5 INHALANT ORAL 2 TIMES DAILY
COMMUNITY

## 2019-08-04 RX ORDER — CIPROFLOXACIN 2 MG/ML
400 INJECTION, SOLUTION INTRAVENOUS EVERY 24 HOURS
Status: DISCONTINUED | OUTPATIENT
Start: 2019-08-04 | End: 2019-08-07

## 2019-08-04 RX ORDER — NALOXONE HYDROCHLORIDE 0.4 MG/ML
.1-.4 INJECTION, SOLUTION INTRAMUSCULAR; INTRAVENOUS; SUBCUTANEOUS
Status: DISCONTINUED | OUTPATIENT
Start: 2019-08-04 | End: 2019-08-14 | Stop reason: HOSPADM

## 2019-08-04 RX ORDER — BUDESONIDE 0.5 MG/2ML
0.5 INHALANT ORAL 2 TIMES DAILY
Status: DISCONTINUED | OUTPATIENT
Start: 2019-08-04 | End: 2019-08-14 | Stop reason: HOSPADM

## 2019-08-04 RX ORDER — SODIUM CHLORIDE 9 MG/ML
INJECTION, SOLUTION INTRAVENOUS CONTINUOUS
Status: DISCONTINUED | OUTPATIENT
Start: 2019-08-04 | End: 2019-08-08

## 2019-08-04 RX ORDER — ARFORMOTEROL TARTRATE 15 UG/2ML
15 SOLUTION RESPIRATORY (INHALATION) 2 TIMES DAILY
COMMUNITY

## 2019-08-04 RX ORDER — ONDANSETRON 4 MG/1
4 TABLET, ORALLY DISINTEGRATING ORAL EVERY 6 HOURS PRN
Status: DISCONTINUED | OUTPATIENT
Start: 2019-08-04 | End: 2019-08-14 | Stop reason: HOSPADM

## 2019-08-04 RX ORDER — POTASSIUM CHLORIDE 7.45 MG/ML
10 INJECTION INTRAVENOUS
Status: DISCONTINUED | OUTPATIENT
Start: 2019-08-04 | End: 2019-08-14 | Stop reason: HOSPADM

## 2019-08-04 RX ORDER — CIPROFLOXACIN 2 MG/ML
400 INJECTION, SOLUTION INTRAVENOUS EVERY 12 HOURS
Status: DISCONTINUED | OUTPATIENT
Start: 2019-08-04 | End: 2019-08-04

## 2019-08-04 RX ORDER — POTASSIUM CHLORIDE 29.8 MG/ML
20 INJECTION INTRAVENOUS
Status: DISCONTINUED | OUTPATIENT
Start: 2019-08-04 | End: 2019-08-14 | Stop reason: HOSPADM

## 2019-08-04 RX ORDER — ATENOLOL 50 MG/1
50 TABLET ORAL DAILY
Status: DISCONTINUED | OUTPATIENT
Start: 2019-08-05 | End: 2019-08-08

## 2019-08-04 RX ORDER — POTASSIUM CL/LIDO/0.9 % NACL 10MEQ/0.1L
10 INTRAVENOUS SOLUTION, PIGGYBACK (ML) INTRAVENOUS
Status: DISCONTINUED | OUTPATIENT
Start: 2019-08-04 | End: 2019-08-14 | Stop reason: HOSPADM

## 2019-08-04 RX ORDER — LIDOCAINE 40 MG/G
CREAM TOPICAL
Status: DISCONTINUED | OUTPATIENT
Start: 2019-08-04 | End: 2019-08-14 | Stop reason: HOSPADM

## 2019-08-04 RX ADMIN — BUDESONIDE 0.5 MG: 0.5 INHALANT RESPIRATORY (INHALATION) at 20:00

## 2019-08-04 RX ADMIN — SODIUM CHLORIDE 1000 ML: 9 INJECTION, SOLUTION INTRAVENOUS at 15:34

## 2019-08-04 RX ADMIN — MORPHINE SULFATE 2 MG: 2 INJECTION, SOLUTION INTRAMUSCULAR; INTRAVENOUS at 12:04

## 2019-08-04 RX ADMIN — ONDANSETRON 4 MG: 2 INJECTION INTRAMUSCULAR; INTRAVENOUS at 11:54

## 2019-08-04 RX ADMIN — SODIUM CHLORIDE 60 ML: 9 INJECTION, SOLUTION INTRAVENOUS at 13:28

## 2019-08-04 RX ADMIN — UMECLIDINIUM 1 PUFF: 62.5 AEROSOL, POWDER ORAL at 18:03

## 2019-08-04 RX ADMIN — SODIUM CHLORIDE 1000 ML: 9 INJECTION, SOLUTION INTRAVENOUS at 11:50

## 2019-08-04 RX ADMIN — CIPROFLOXACIN 400 MG: 2 INJECTION, SOLUTION INTRAVENOUS at 18:38

## 2019-08-04 RX ADMIN — LIDOCAINE HYDROCHLORIDE: 20 JELLY TOPICAL at 15:25

## 2019-08-04 RX ADMIN — SODIUM CHLORIDE: 9 INJECTION, SOLUTION INTRAVENOUS at 18:03

## 2019-08-04 RX ADMIN — ARFORMOTEROL TARTRATE 15 MCG: 15 SOLUTION RESPIRATORY (INHALATION) at 20:00

## 2019-08-04 RX ADMIN — IOPAMIDOL 43 ML: 755 INJECTION, SOLUTION INTRAVENOUS at 13:28

## 2019-08-04 ASSESSMENT — ACTIVITIES OF DAILY LIVING (ADL)
RETIRED_EATING: 0-->INDEPENDENT
ADLS_ACUITY_SCORE: 16
AMBULATION: 1-->ASSISTIVE EQUIPMENT
FALL_HISTORY_WITHIN_LAST_SIX_MONTHS: NO
BATHING: 0-->INDEPENDENT
TRANSFERRING: 0-->INDEPENDENT
RETIRED_COMMUNICATION: 0-->UNDERSTANDS/COMMUNICATES WITHOUT DIFFICULTY
TOILETING: 0-->INDEPENDENT
SWALLOWING: 0-->SWALLOWS FOODS/LIQUIDS WITHOUT DIFFICULTY
COGNITION: 1 - ATTENTION OR MEMORY DEFICITS
DRESS: 0-->INDEPENDENT

## 2019-08-04 ASSESSMENT — ENCOUNTER SYMPTOMS
CONSTIPATION: 1
APPETITE CHANGE: 1
CHILLS: 0
VOMITING: 1
ABDOMINAL DISTENTION: 1
DYSURIA: 1
ABDOMINAL PAIN: 1
ACTIVITY CHANGE: 1
FEVER: 0
NAUSEA: 1

## 2019-08-04 ASSESSMENT — MIFFLIN-ST. JEOR: SCORE: 767.94

## 2019-08-04 NOTE — H&P
[facility]    History and Physical - Hospitalist Service       Date of Admission:  8/4/2019    Assessment & Plan   Melissa Alfaro is a 85 year old female admitted on 8/4/2019. She presented to the emergency room accompanied by her daughter with abdominal pain and distention which is started this morning.  1-abdominal pain and distention: CT scan of the abdomen showed possible small bowel obstruction versus ileus.  She also has some diverticula and colon and possibility of pyelonephritis with horseshoe kidney was raised.  Her white blood count is elevated.  At this point we will go ahead and check the urine analysis.  She will be treated for possible small bowel obstruction with bowel rest, IV fluid and pain control.  At this point there is no evidence for acute abdomen or need for surgical consultation.  However will continue to monitor her progress.  2-mild to moderate ascites: I do not have a clear etiology for her ascites.  CT scan of the abdomen did not raise any possibility of malignancy.  Once her symptoms resolve she will need to have a CT of the abdomen chest and pelvis to evaluate for ascites.  3-history of COPD: I would like to resume her home medication.  She did not have any breathing difficulty at time of my examination.  4-the patient has history of penicillin allergy.  I would like to start her on Cipro IV for any possible intra-abdominal processes and while we are waiting for urine analysis and urine culture.       Diet: NPO.  DVT Prophylaxis: Low Risk/Ambulatory with no VTE prophylaxis indicated  Doran Catheter: not present  Code Status: Full    Disposition Plan   Expected discharge: 2 - 3 days, recommended to prior living arrangement once Her abdominal pain has improved and white blood count has returned to baseline and the patient is able to eat.  Entered: Suman Loyola MD 08/04/2019, 3:08 PM     The patient's care was discussed with the Patient and Patient's  daughter.    Suman Loyola MD  [facility]    ______________________________________________________________________    Chief Complaint   Abdominal pain and distention    History is obtained from the patient  And the patient's daughter    History of Present Illness   Melissa Alfaro is a 85 year old female who presented to the emergency room with 1 day history of abdominal pain and distention.  She recently moved from [facility] to live with her daughter in [facility].  She has history of uterine prolapse, COPD, hypertension and paroxysmal atrial fibrillation.  She has had a remote history of appendicitis status post appendectomy.  Her daughter states that starting this morning she woke up with diffuse abdominal pain and tenderness.  She rated the pain in 7 out of 10 in severity and all over lower abdomen.  She felt nauseous and vomited some phlegm.  Her last p.o. intake was yesterday morning.  She does not report any diarrhea no constipation no dysuria frequency or urinary urgency.  CT scan of the abdomen showed some diverticular Kalosis, small bowel obstruction versus ileus, horseshoe kidney and possibility of pyelonephritis was raised as well.  All of other review of system are negative.    Review of Systems    The 10 point Review of Systems is negative other than noted in the HPI or here.     Past Medical History    I have reviewed this patient's medical history and updated it with pertinent information if needed.   Past Medical History:   Diagnosis Date     Anxiety      Benign neoplasm of colon      Carotid stenosis, asymptomatic      COPD (chronic obstructive pulmonary disease) (H)      Corns and callosities      Dermatophytosis of nail      Essential hypertension      History of atrial fibrillation      Hyperlipidemia      Hyperlipidemia      Insomnia      Irritable bowel syndrome      Nocturia      Plantar fascial fibromatosis      Primary localized osteoarthrosis of the  hip      Pulmonary hypertension (H)      Sprain of lumbar region      Vitamin D deficiency      VSD (ventricular septal defect and aortic arch hypoplasia        Past Surgical History   I have reviewed this patient's surgical history and updated it with pertinent information if needed.  Past Surgical History:   Procedure Laterality Date     APPENDECTOMY       AS TOTAL HIP ARTHROPLASTY         Social History   I have reviewed this patient's social history and updated it with pertinent information if needed.  Social History     Tobacco Use     Smoking status: None   Substance Use Topics     Alcohol use: None     Drug use: None       Family History   I have reviewed this patient's family history and updated it with pertinent information if needed.   Family History   Problem Relation Age of Onset     Chronic Obstructive Pulmonary Disease Mother      Cerebrovascular Disease Mother      Cancer Father      Heart Disease Father      Seizure Disorder Father      Osteoporosis Sister      Lung Cancer Brother        Prior to Admission Medications   Prior to Admission Medications   Prescriptions Last Dose Informant Patient Reported? Taking?   ACETAMINOPHEN PO 8/4/2019 at AM Daughter Yes Yes   Sig: Take 325-650 mg by mouth every 6 hours as needed for pain   albuterol (PROAIR HFA/PROVENTIL HFA/VENTOLIN HFA) 108 (90 Base) MCG/ACT Inhaler  at PRN Daughter Yes Yes   Sig: Inhale 1-2 puffs into the lungs every 4 hours as needed for shortness of breath / dyspnea or wheezing   amLODIPine (NORVASC) 10 MG tablet 8/4/2019 at AM Daughter Yes Yes   Sig: Take 10 mg by mouth daily   arformoterol (BROVANA) 15 MCG/2ML NEBU neb solution 8/3/2019 at PM Daughter Yes Yes   Sig: Take 15 mcg by nebulization 2 times daily   atenolol (TENORMIN) 50 MG tablet 8/4/2019 at AM Daughter Yes Yes   Sig: Take 50 mg by mouth daily   budesonide (PULMICORT) 0.5 MG/2ML neb solution 8/3/2019 at PM Daughter Yes Yes   Sig: Take 0.5 mg by nebulization 2 times daily    glycerin-hypromellose- (ARTIFICIAL TEARS) 0.2-0.2-1 % SOLN ophthalmic solution 8/3/2019 at Unknown time Daughter Yes Yes   Sig: Place 1 drop into both eyes 4 times daily as needed    tiotropium (SPIRIVA) 18 MCG capsule 8/3/2019 at PM Daughter Yes Yes   Sig: Inhale 18 mcg into the lungs daily    vitamin D3 (CHOLECALCIFEROL) 92292 units capsule 7/29/2019 Daughter Yes Yes   Sig: Take 50,000 Units by mouth once a week On Monday.      Facility-Administered Medications: None     Allergies   Allergies   Allergen Reactions     No Clinical Screening - See Comments Shortness Of Breath     With some mold spores  Other reaction(s): Other  Heart races with antihistamines     Diphtheria-Tetanus Toxoids      Other reaction(s): Other  Red swollen arm     Epinephrine      Other reaction(s): Other  Heart rate increases & her body shakes & trembles     Penicillin G      Other reaction(s): Other (Specify in Comments)       Physical Exam   Vital Signs: Temp: 97.6  F (36.4  C) Temp src: Oral BP: (!) 151/59 Pulse: 76 Heart Rate: 82 Resp: 16 SpO2: 92 % O2 Device: None (Room air)    Weight: 85 lbs 0 oz    Constitutional: awake, alert, cooperative, no apparent distress, and appears stated age  Eyes: Lids and lashes normal, pupils equal, round and reactive to light, extra ocular muscles intact, sclera clear, conjunctiva normal  ENT: Normocephalic, without obvious abnormality, atraumatic, sinuses nontender on palpation, external ears without lesions, oral pharynx with moist mucous membranes, tonsils without erythema or exudates, gums normal and good dentition.  Hematologic / Lymphatic: no cervical lymphadenopathy and no supraclavicular lymphadenopathy  Respiratory: She does have poor air movement and some crackle at the bases bilaterally  Cardiovascular: She has a regular rate and rhythm at the time of my examination and there is soft systolic ejection murmur at left sternal border  GI: Abdomen is mildly distended she is little  tender.  There is no guarding or rebound.  I could not appreciate ascites on physical exam  Skin: no bruising or bleeding, normal skin color, texture, turgor and no redness, warmth, or swelling  Musculoskeletal: She does not have any lower extremity edema she does have muscle atrophy in bilateral lower extremities  Neurologic: Awake, alert, oriented to name, place and time.  Cranial nerves II-XII are grossly intact.  Motor is 5 out of 5 bilaterally.  Cerebellar finger to nose, heel to shin intact.  Sensory is intact.  Babinski down going, Romberg negative, and gait is normal.  Mental Status Exam:  Level of Alertness:   awake  Orientation:   person, place, time  Cranial Nerves:  cranial nerves II-XII are grossly intact  Motor Exam:  moves all extremities well and symmetrically    Data   Data reviewed today: I reviewed all medications, new labs and imaging results over the last 24 hours. I personally reviewed     Recent Labs   Lab 08/04/19  1151   WBC 28.0*   HGB 12.3   MCV 89         POTASSIUM 4.4   CHLORIDE 101   CO2 25   BUN 33*   CR 1.28*   ANIONGAP 11   LUIS ENRIQUE 9.6   *   ALBUMIN 3.8   PROTTOTAL 7.6   BILITOTAL 0.6   ALKPHOS 58   ALT 21   AST 22   LIPASE 131     Recent Results (from the past 24 hour(s))   CT Abdomen Pelvis w Contrast    Narrative    CT ABDOMEN AND PELVIS WITH CONTRAST   8/4/2019 1:34 PM     HISTORY: Abdominal pain, unspecified.    TECHNIQUE:  CT abdomen and pelvis with 43 mL Isovue-370 IV. Radiation  dose for this scan was reduced using automated exposure control,  adjustment of the mA and/or kV according to patient size, or iterative  reconstruction technique.    COMPARISON: None available.    FINDINGS: Mild subsegmental atelectasis seen in the bilateral lung  bases.    No focal hepatic lesion is seen. Mild nodular contour of the liver. No  intrahepatic biliary ductal dilatation is present. The common bile  duct measures up to 7 mm, likely within the normal range given  patient's  age. Gallbladder is distended measuring up to 4.1 cm.    The spleen, adrenal glands, and pancreas are unremarkable. 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  Scattered areas of wedge-shaped hypoattenuation seen bilaterally  spanning from pelvis to cortex measuring up to 1.5 cm on the left to  0.8 cm on the right. A 5.2 cm cyst is noted arising from the midpole  of the right kidney. No significant perirenal stranding is present.    Stomach is moderately distended with fluid and air. Multiple dilated  loops of small bowel seen in the lower abdomen with several also  demonstrating increased wall thickening. A loop in the right lower  abdomen measures up to 3.1 cm in diameter. No discrete transition  point is identified. Multiple colonic diverticula seen in the sigmoid  colon. Mild wall thickening also noted in the ascending colon and  cecum within the right hemiabdomen (series 3, image 34). Mild to  moderate intra-abdominal and pelvic ascites is present.    Scattered atherosclerotic calcification seen in the abdominal aorta.  IVC is partially collapsed. No retroperitoneal or mesenteric  lymphadenopathy is seen.    Urinary bladder is mildly distended. Uterus is unremarkable.  Evaluation of the pelvis is limited due to streak artifact from right  hip replacement. No definite pelvic lymphadenopathy is seen.    Diffuse osteopenia seen of the bones. Multilevel degenerative changes  present in the spine. Right hip arthroplasty noted.      Impression    IMPRESSION:  1.  Multiple dilated loops of small bowel seen in the lower abdomen  with no discrete transition point seen. Findings suggestive of partial  small bowel obstruction versus less likely ileus. Colonic loops are  not distended.  2.  Wedge-shaped areas of hypoattenuation seen in the horseshoe shaped  kidneys. Although no perinephric stranding is seen surrounding the  kidneys, findings may suggest pyelonephritis. Correlate  with  urinalysis.  3.  Mild to moderate intra-abdominal and pelvic ascites. Mild wall  thickening in the ascending colon as well as several loops of small  bowel likely reactive to surrounding ascites. Correlate with clinical  symptoms for enteritis or colitis.    SERVANDO LANDON MD

## 2019-08-04 NOTE — PROGRESS NOTES
RECEIVING UNIT ED HANDOFF REVIEW    ED Nurse Handoff Report was reviewed by: Chichi Thacker on August 4, 2019 at 3:44 PM

## 2019-08-04 NOTE — PHARMACY-ADMISSION MEDICATION HISTORY
Admission medication history interview status for the 8/4/2019  admission is complete. See EPIC admission navigator for prior to admission medications     Medication history source reliability:Moderate    Actions taken by pharmacist (provider contacted, etc):None     Additional medication history information not noted on PTA med list :  Meds Added: Brovana, Budesonide, Vit D  Meds Changed: Amlodipine, Atenolol  Meds Deleted:Aspirin, furosemide, lorazepam, potassium     Medication reconciliation/reorder completed by provider prior to medication history? No    Time spent in this activity: 10 minutes    Prior to Admission medications    Medication Sig Last Dose Taking? Auth Provider   ACETAMINOPHEN PO Take 325-650 mg by mouth every 6 hours as needed for pain 8/4/2019 at AM Yes Reported, Patient   albuterol (PROAIR HFA/PROVENTIL HFA/VENTOLIN HFA) 108 (90 Base) MCG/ACT Inhaler Inhale 1-2 puffs into the lungs every 4 hours as needed for shortness of breath / dyspnea or wheezing  at PRN Yes Reported, Patient   amLODIPine (NORVASC) 10 MG tablet Take 10 mg by mouth daily 8/4/2019 at AM Yes Unknown, Entered By History   arformoterol (BROVANA) 15 MCG/2ML NEBU neb solution Take 15 mcg by nebulization 2 times daily 8/3/2019 at PM Yes Unknown, Entered By History   atenolol (TENORMIN) 50 MG tablet Take 50 mg by mouth daily 8/4/2019 at AM Yes Unknown, Entered By History   budesonide (PULMICORT) 0.5 MG/2ML neb solution Take 0.5 mg by nebulization 2 times daily 8/3/2019 at PM Yes Unknown, Entered By History   glycerin-hypromellose- (ARTIFICIAL TEARS) 0.2-0.2-1 % SOLN ophthalmic solution Place 1 drop into both eyes 4 times daily as needed  8/3/2019 at Unknown time Yes Reported, Patient   tiotropium (SPIRIVA) 18 MCG capsule Inhale 18 mcg into the lungs daily  8/3/2019 at PM Yes Reported, Patient   vitamin D3 (CHOLECALCIFEROL) 12650 units capsule Take 50,000 Units by mouth once a week On Monday. 7/29/2019 Yes Unknown, Entered By  History

## 2019-08-04 NOTE — ED PROVIDER NOTES
"  History     Chief Complaint:  Nausea & Vomiting    The history is provided by the patient.      Melissa Alfaro is a 85 year old female with a history of atrial fibrillation, CHF, COPD, hypertension, hyperlipidemia, and a uterine prolapse, who presents to the emergency department with her daughter for evaluation of abdominal pain with associated nausea and vomiting. Two days ago, the patient and her daughter state that she had a decrease in appetite. Yesterday morning, she ate a small breakfast, but did not feel well after it. Later on, she developed nausea, vomiting, and a distended abdomen. Her daughter notes that her vomit was more phlegmy than normal vomit. She also has some back pain, has not been able to pass gas or have a bowel movement, but has been burping a lot. This morning, the patient states she went to the bathroom and had some discomfort while urinating, but did not have that same feeling with her last urination prior to arrival. She also states she has never experienced this before. Her persisting abdominal pain prompted the patient and her daughter to seek evaluation at . They were then sent over to the ED for further evaluation. Just prior to arrival, the patient vomited with what looked like \"normal vomit\" to her daughter and was not as phlegm like as yesterday.     Patient denies any myalgia, fever, chills, or diarrhea. She denies any blood in her vomit. To note, the patient has had an appendectomy in the past.     Allergies:  Diphtheria-Tetanus Toxoids  Epinephrine  Penicillin G     Medications:    Albuterol  Atenolol  Amlodipine  Vitamin D3  Spiriva     Past Medical History:    Anxiety  CHF  Hyponatremia   Vitamin D deficiency   Hyperlipidemia  Insomnia  Uterine prolapse  Atrial fibrillation   Hypertension  COPD  IBS  Osteoarthrosis  VSD    Past Surgical History:    Appendectomy  Total hip arthroplasty     Family History:    COPD  Cerebrovascular disease  Cancer  Heart disease: " "Father  Seizures  Osteoporosis  Lung cancer    Social History:  Negative for tobacco use.  Negative for alcohol use.  Negative for drug use.  Patient lives with her daughter.  Marital Status:  Single      Review of Systems   Constitutional: Positive for activity change and appetite change. Negative for chills and fever.   Gastrointestinal: Positive for abdominal distention, abdominal pain, constipation, nausea and vomiting.   Genitourinary: Positive for dysuria.   All other systems reviewed and are negative.      Physical Exam     Patient Vitals for the past 24 hrs:   BP Temp Temp src Pulse Heart Rate Resp SpO2 Height Weight   08/04/19 1345 (!) 151/59 -- -- -- -- -- 92 % -- --   08/04/19 1305 -- -- -- -- -- -- 93 % -- --   08/04/19 1230 132/46 -- -- 76 -- -- 96 % -- --   08/04/19 1219 -- -- -- -- -- 16 -- -- --   08/04/19 1158 (!) 145/48 -- -- 75 -- -- 97 % -- --   08/04/19 1140 138/68 97.6  F (36.4  C) Oral 82 82 18 97 % 1.549 m (5' 1\") 38.6 kg (85 lb)   08/04/19 1138 138/68 -- -- 80 -- -- 98 % -- --     Physical Exam  Gen: Pleasant, appears stated age.    Eye:   Right pupil: 4 mm and minimally active (secondary to cataract surgery).   Left pupil: 2 mm and reactive   Sclera non-injected.    ENT:   Moist mucus membranes.     Normal tongue.    Oropharynx without lesions.    Cardiac:     Normal rate and regular rhythm.    No murmurs, gallops, or rubs.    Pulmonary:     Clear to auscultation bilaterally.    No wheezes, rales, or rhonchi.    Abdomen:     Hypoactive bowel sounds. Distended with diffuse abdominal tenderness. Some guarding in epigastrium.    Musculoskeletal:     Normal movement of all extremities without evidence for deficit.    Extremities:    No edema.    Skin:   Warm and dry.    Neurologic:    Non-focal exam without asymmetric weakness or numbness.    Normal tone      Emergency Department Course   Imaging:  CT Abdomen/Pelvis with IV contrast:   1.  Multiple dilated loops of small bowel seen in the lower " abdomen with no discrete transition point seen. Findings suggestive of partial small bowel obstruction versus less likely ileus. Colonic loops are not distended.  2.  Wedge-shaped areas of hypoattenuation seen in the horseshoe shaped kidneys. Although no perinephric stranding is seen surrounding the kidneys, findings may suggest pyelonephritis. Correlate with urinalysis.  3.  Mild to moderate intra-abdominal and pelvic ascites. Mild wall thickening in the ascending colon as well as several loops of small bowel likely reactive to surrounding ascites. Correlate with clinical symptoms for enteritis or colitis. As per radiology.    Laboratory:  CBC: WBC: 28.0 (H), HGB: 12.3, PLT: 377  CMP: Glucose 199 (H), Urea Nitrogen: 33 (H), Creatinine: 1.28 (H), GFR Estimate: 38 (L), o/w WNL     Lipase: 131    ISTAT Gases Lactate kylah POCT: Negative     UA with Microscopic: Pending     Blood culture x2: Pending     Interventions:  1150 NS 1L IV  1154 Zofran 4 mg IV  1204 Morphine 2 mg IV    Emergency Department Course:  Nursing notes and vitals reviewed. 1150 I performed an exam of the patient as documented above.     IV inserted. Medicine administered as documented above. Blood drawn. This was sent to the lab for further testing, results above.    The patient was sent for an abdomen/pelvis CT while in the emergency department, findings above.     The patient provided a urine sample here in the emergency department. This was sent for laboratory testing, findings above.     1359 I rechecked the patient and discussed the results of her workup thus far.     2:38 PM  I consulted with Dr. Loyola of the hospitalist services. He is in agreement to accept the patient for admission.    Findings and plan explained to the Patient and daughter who consents to admission. Discussed the patient with Dr. Loyola , who will admit the patient to a medical bed for further monitoring, evaluation, and treatment.    Impression & Plan    Medical  Decision Making:  Melissa Alfaro is a 85 year old female with a history of hypertension who presents today with vomiting and abdominal pain and distention. The only surgical history seems to be an appendectomy many years ago. On exam, the patient has a distended abdomen with hypoactive bowel sounds and guarding. CT confirmed clinical subincision for a small bowel obstruction. The patient has a remarkably high WBC. Given no sounds of perforation, I suspect this is reactive from her bowel obstruction. Lactate is normal. Fluid resuscitation was initiated in the ED. I have ordered a nasogastric tube decompression as well given proximal bowel loop dilatation. She will be admitted to the hospitalist service to follow up with a clinical exam, and hopefully initiate medical management of her bowel obstruction. At this point, I do not think emergency surgery or surgical consultation is needed.    Critical Care time:  none    Diagnosis:    ICD-10-CM    1. Small bowel obstruction (H) K56.609    2. Leukocytosis, unspecified type D72.829        Disposition:  Admitted to medical bed    Scribe Disclosure:  Estefania RUSH, am serving as a scribe on 8/4/2019 at 11:58 AM to personally document services performed by Nicole Ansari MD based on my observations and the provider's statements to me.     Estefania James  8/4/2019    EMERGENCY DEPARTMENT       Nicole Ansari MD  08/04/19 1893

## 2019-08-04 NOTE — ED NOTES
"[facility]  ED Nurse Handoff Report    ED Chief complaint: Nausea & Vomiting      ED Diagnosis:   Final diagnoses:   Small bowel obstruction (H)   Leukocytosis, unspecified type       Code Status: Full Code    Allergies:   Allergies   Allergen Reactions     No Clinical Screening - See Comments Shortness Of Breath     With some mold spores  Other reaction(s): Other  Heart races with antihistamines     Diphtheria-Tetanus Toxoids      Other reaction(s): Other  Red swollen arm     Epinephrine      Other reaction(s): Other  Heart rate increases & her body shakes & trembles     Penicillin G      Other reaction(s): Other (Specify in Comments)       Activity level - Baseline/Home:  Stand with Assist  Activity Level - Current:   Stand with Assist    Patient's Preferred language: English   Needed?: No    Isolation: No  Infection: Not Applicable  Bariatric?: No    Vital Signs:   Vitals:    08/04/19 1219 08/04/19 1230 08/04/19 1305 08/04/19 1345   BP:  132/46  (!) 151/59   Pulse:  76     Resp: 16      Temp:       TempSrc:       SpO2:  96% 93% 92%   Weight:       Height:           Cardiac Rhythm: ,        Pain level: 0-10 Pain Scale: 1    Is this patient confused?: Yes-mildly per daughter  Does this patient have a guardian?  No         If yes, is there guardianship documents in the Epic \"Code/ACP\" activity?  N/A         Guardian Notified?  N/A  [place] - Suicide Severity Rating Scale Completed?  Yes  If yes, what color did the patient score?  White    Patient Report: Initial Complaint: Pt to the ED with her daughter for abdominal pain with nausea and vomiting. Two days ago, the patient had a decrease in appetite. Yesterday she developed nausea, vomiting, and a distended abdomen.   Focused Assessment: Pt comfortable. Pt goes back and forth between c/o mild pain or nausea and then stating she has none.   Tests Performed: blood, CT, EKG  Abnormal Results: see EPC  Treatments provided: 1L NS, Morphine " 2mg, NG tube    Family Comments: daughter at bedside    OBS brochure/video discussed/provided to patient/family: N/A              Name of person given brochure if not patient: n/a              Relationship to patient: n/a    ED Medications:   Medications   0.9% sodium chloride BOLUS (0 mLs Intravenous Stopped 8/4/19 1219)     Followed by   sodium chloride 0.9% infusion (has no administration in time range)   ondansetron (ZOFRAN) injection 4 mg (4 mg Intravenous Given 8/4/19 1154)   lidocaine (XYLOCAINE) 2 % external gel 10 mL (has no administration in time range)   benzocaine 20% (HURRICAINE/TOPEX) 20 % spray 2.5 mL (has no administration in time range)   0.9% sodium chloride BOLUS (has no administration in time range)   lidocaine (XYLOCAINE) 2 % external gel (has no administration in time range)   lidocaine (XYLOCAINE) 2 % external gel (has no administration in time range)   morphine (PF) injection 2 mg (2 mg Intravenous Given 8/4/19 1204)   Saline flush (60 mLs Intravenous Given 8/4/19 1328)   iopamidol (ISOVUE-370) solution 43 mL (43 mLs Intravenous Given 8/4/19 1328)       Drips infusing?:  Yes-2nd liter NS    For the majority of the shift this patient was Green.   Interventions performed were n/a.    Severe Sepsis OR Septic Shock Diagnosis Present: No    To be done/followed up on inpatient unit:  UA not obtained in ED    ED NURSE PHONE NUMBER: [phone]

## 2019-08-04 NOTE — PLAN OF CARE
"VSS. \"Minimal\" pain reported in abdomen. No pain meds given at this time. Pt NPO, swabs at bedside. NG tube on intermittent suction. Standby assist, nausea reported with movement. Pt forgetful, A/O to self and situation. Daughter at bedside, active in cares. Will continue to monitor pt.   "

## 2019-08-05 ENCOUNTER — APPOINTMENT (OUTPATIENT)
Dept: GENERAL RADIOLOGY | Facility: CLINIC | Age: 84
DRG: 330 | End: 2019-08-05
Attending: SURGERY
Payer: COMMERCIAL

## 2019-08-05 LAB
ANION GAP SERPL CALCULATED.3IONS-SCNC: 10 MMOL/L (ref 3–14)
BUN SERPL-MCNC: 44 MG/DL (ref 7–30)
CALCIUM SERPL-MCNC: 8.3 MG/DL (ref 8.5–10.1)
CHLORIDE SERPL-SCNC: 107 MMOL/L (ref 94–109)
CO2 SERPL-SCNC: 23 MMOL/L (ref 20–32)
CREAT SERPL-MCNC: 1.68 MG/DL (ref 0.52–1.04)
ERYTHROCYTE [DISTWIDTH] IN BLOOD BY AUTOMATED COUNT: 15 % (ref 10–15)
GFR SERPL CREATININE-BSD FRML MDRD: 27 ML/MIN/{1.73_M2}
GLUCOSE BLDC GLUCOMTR-MCNC: 102 MG/DL (ref 70–99)
GLUCOSE BLDC GLUCOMTR-MCNC: 131 MG/DL (ref 70–99)
GLUCOSE BLDC GLUCOMTR-MCNC: 165 MG/DL (ref 70–99)
GLUCOSE SERPL-MCNC: 147 MG/DL (ref 70–99)
HCT VFR BLD AUTO: 33.9 % (ref 35–47)
HGB BLD-MCNC: 10.7 G/DL (ref 11.7–15.7)
LACTATE BLD-SCNC: 1.8 MMOL/L (ref 0.7–2)
MCH RBC QN AUTO: 28.3 PG (ref 26.5–33)
MCHC RBC AUTO-ENTMCNC: 31.6 G/DL (ref 31.5–36.5)
MCV RBC AUTO: 90 FL (ref 78–100)
PLATELET # BLD AUTO: 305 10E9/L (ref 150–450)
POTASSIUM SERPL-SCNC: 4.5 MMOL/L (ref 3.4–5.3)
RBC # BLD AUTO: 3.78 10E12/L (ref 3.8–5.2)
SODIUM SERPL-SCNC: 140 MMOL/L (ref 133–144)
WBC # BLD AUTO: 32.6 10E9/L (ref 4–11)

## 2019-08-05 PROCEDURE — 83605 ASSAY OF LACTIC ACID: CPT | Performed by: INTERNAL MEDICINE

## 2019-08-05 PROCEDURE — 36415 COLL VENOUS BLD VENIPUNCTURE: CPT | Performed by: INTERNAL MEDICINE

## 2019-08-05 PROCEDURE — 94640 AIRWAY INHALATION TREATMENT: CPT

## 2019-08-05 PROCEDURE — 94640 AIRWAY INHALATION TREATMENT: CPT | Mod: 76

## 2019-08-05 PROCEDURE — 12000000 ZZH R&B MED SURG/OB

## 2019-08-05 PROCEDURE — 40000986 XR ABDOMEN 1 VW

## 2019-08-05 PROCEDURE — 25000132 ZZH RX MED GY IP 250 OP 250 PS 637: Performed by: SURGERY

## 2019-08-05 PROCEDURE — 25800030 ZZH RX IP 258 OP 636: Performed by: INTERNAL MEDICINE

## 2019-08-05 PROCEDURE — 25000128 H RX IP 250 OP 636: Performed by: INTERNAL MEDICINE

## 2019-08-05 PROCEDURE — 00000146 ZZHCL STATISTIC GLUCOSE BY METER IP

## 2019-08-05 PROCEDURE — 25000132 ZZH RX MED GY IP 250 OP 250 PS 637: Performed by: INTERNAL MEDICINE

## 2019-08-05 PROCEDURE — 80048 BASIC METABOLIC PNL TOTAL CA: CPT | Performed by: INTERNAL MEDICINE

## 2019-08-05 PROCEDURE — 25000125 ZZHC RX 250: Performed by: INTERNAL MEDICINE

## 2019-08-05 PROCEDURE — 40000275 ZZH STATISTIC RCP TIME EA 10 MIN

## 2019-08-05 PROCEDURE — 85027 COMPLETE CBC AUTOMATED: CPT | Performed by: INTERNAL MEDICINE

## 2019-08-05 PROCEDURE — 25000128 H RX IP 250 OP 636: Performed by: SURGERY

## 2019-08-05 PROCEDURE — 99221 1ST HOSP IP/OBS SF/LOW 40: CPT | Performed by: SURGERY

## 2019-08-05 PROCEDURE — 99233 SBSQ HOSP IP/OBS HIGH 50: CPT | Performed by: INTERNAL MEDICINE

## 2019-08-05 RX ORDER — BISACODYL 10 MG
10 SUPPOSITORY, RECTAL RECTAL ONCE
Status: COMPLETED | OUTPATIENT
Start: 2019-08-05 | End: 2019-08-05

## 2019-08-05 RX ADMIN — BUDESONIDE 0.5 MG: 0.5 INHALANT RESPIRATORY (INHALATION) at 07:14

## 2019-08-05 RX ADMIN — ARFORMOTEROL TARTRATE 15 MCG: 15 SOLUTION RESPIRATORY (INHALATION) at 07:14

## 2019-08-05 RX ADMIN — UMECLIDINIUM 1 PUFF: 62.5 AEROSOL, POWDER ORAL at 19:00

## 2019-08-05 RX ADMIN — HYDROMORPHONE HYDROCHLORIDE 0.3 MG: 1 INJECTION, SOLUTION INTRAMUSCULAR; INTRAVENOUS; SUBCUTANEOUS at 11:31

## 2019-08-05 RX ADMIN — CIPROFLOXACIN 400 MG: 2 INJECTION, SOLUTION INTRAVENOUS at 16:22

## 2019-08-05 RX ADMIN — METRONIDAZOLE 500 MG: 500 INJECTION, SOLUTION INTRAVENOUS at 13:34

## 2019-08-05 RX ADMIN — SODIUM CHLORIDE: 9 INJECTION, SOLUTION INTRAVENOUS at 23:03

## 2019-08-05 RX ADMIN — METRONIDAZOLE 500 MG: 500 INJECTION, SOLUTION INTRAVENOUS at 19:00

## 2019-08-05 RX ADMIN — ARFORMOTEROL TARTRATE 15 MCG: 15 SOLUTION RESPIRATORY (INHALATION) at 19:32

## 2019-08-05 RX ADMIN — SODIUM CHLORIDE 500 ML: 9 INJECTION, SOLUTION INTRAVENOUS at 11:23

## 2019-08-05 RX ADMIN — BUDESONIDE 0.5 MG: 0.5 INHALANT RESPIRATORY (INHALATION) at 19:32

## 2019-08-05 RX ADMIN — ATENOLOL 50 MG: 50 TABLET ORAL at 08:24

## 2019-08-05 RX ADMIN — AMLODIPINE BESYLATE 10 MG: 10 TABLET ORAL at 08:24

## 2019-08-05 RX ADMIN — SODIUM CHLORIDE: 9 INJECTION, SOLUTION INTRAVENOUS at 00:30

## 2019-08-05 RX ADMIN — Medication 10 MG: at 13:15

## 2019-08-05 ASSESSMENT — ACTIVITIES OF DAILY LIVING (ADL)
ADLS_ACUITY_SCORE: 16

## 2019-08-05 ASSESSMENT — MIFFLIN-ST. JEOR: SCORE: 839.38

## 2019-08-05 NOTE — PLAN OF CARE
DATE & TIME: 8/5 [id number]      Cognitive Concerns/ Orientation : oriented to self only, forgetful  BEHAVIOR & AGGRESSION TOOL COLOR: Green  CIWA SCORE: N/A  ABNL VS/O2: WDL  MOBILITY: Assist x 1, gait belt, very weak  PAIN MANAGMENT: abd pain with activity, ok at rest  DIET: NPO, ice chips  BOWEL/BLADDER:neg BS, no gas, abd distended and tender, voided on BSC  ABNL LAB/BG: WBC 32.6, going up Cr 1.68 going up  DRAIN/DEVICES: PIV right arm, NG tube intermittent low suction  TELEMETRY RHYTHM: N/A  SKIN: WDL  TESTS/PROCEDURES: Abdominal X-ray completed  D/C DAY/GOALS/PLACE:flagyl added and suppository given per Surgery  OTHER IMPORTANT INFO: Pt green bilious output in NG tube.

## 2019-08-05 NOTE — PLAN OF CARE
DATE & TIME: 8/4/19 1900    Cognitive Concerns/ Orientation : A/O to self  BEHAVIOR & AGGRESSION TOOL COLOR: green  CIWA SCORE: NA   ABNL VS/O2: VSS on 1/2L NC  MOBILITY: SBA  PAIN MANAGMENT: denies pain  DIET: NPO  BOWEL/BLADDER: continent bowel/bladder  ABNL LAB/BG: UA positive, UC pending  DRAIN/DEVICES: NG to low intermittent suction  TELEMETRY RHYTHM: NA  SKIN: intact, WNL  TESTS/PROCEDURES: CT abdomen/pelvis completed, see results  D/C DAY/GOALS/PLACE: TBD  OTHER IMPORTANT INFO: NA

## 2019-08-05 NOTE — PROGRESS NOTES
Patient on 1L NC.  Lung sounds diminished.  Getting Brovana/pulmicort BID per home regimen.    Continue to follow.

## 2019-08-05 NOTE — PLAN OF CARE
DATE & TIME: 8/5 1445   Cognitive Concerns/ Orientation : AO x3, forgetful  BEHAVIOR & AGGRESSION TOOL COLOR: Green  CIWA SCORE: N/A  ABNL VS/O2: WDL  MOBILITY: Assist x 1, gait belt  PAIN MANAGMENT: dilaudid given for abdominal pain  DIET: NPO, ice chips  BOWEL/BLADDER: bladder scan 178 mL pt has not voided, suppository given with no results. Pt states that she has not had any gas. Bowel sounds remain hypoactive.  ABNL LAB/BG: WBC 32.6, Cr 1.68, BG- 165, 147  DRAIN/DEVICES: PIV right arm, NG tube intermittent low suction  TELEMETRY RHYTHM: N/A  SKIN: WDL  TESTS/PROCEDURES: Abdominal X-ray completed  D/C DAY/GOALS/PLACE: pending  OTHER IMPORTANT INFO: Pt green bilious output in NG tube. Pt reports no flatus. Creatinine elevated. Surgery consulted. Flagyl started.

## 2019-08-05 NOTE — PLAN OF CARE
DATE & TIME: 8/4 [id number]  Cognitive Concerns/ Orientation : Alert to time and self, calm, cooperative   BEHAVIOR & AGGRESSION TOOL COLOR: Green  CIWA SCORE: n/a  ABNL VS/O2: VSS on 1L O2 via NC   MOBILITY: SBA   PAIN MANAGMENT: Denies   DIET: NPO, ice chips   BOWEL/BLADDER: Continent, uses bedside commode.   ABNL LAB/BG: UA positive, UC pending  DRAIN/DEVICES: NG to low intermittent suction  TELEMETRY RHYTHM: n/a  SKIN: Intact  TESTS/PROCEDURES: CT abdomen/pelvis completed, see results  D/C DAY/GOALS/PLACE: 2-3 days pending progress  OTHER IMPORTANT INFO: NA

## 2019-08-05 NOTE — CONSULTS
[facility] General Surgery Consultation    Melissa Alfaro MRN# [id number]   YOB: 1933 Age: 85 year old      Date of Admission:  8/4/2019  Date of Consult: 8/5/2019         Assessment and Plan:   Patient is a 85 year old female with h/o COPD, HTN, afib (no anticoagulation) with remote h/o appendectomy who presented on 8/4 with abdominal pain and nausea. CT revealed concern for ileus vs possible SBO, possible enteritis with wall thickening of the colon and small bowel and possible pyelonephritis with horseshoe kidney. Pt denies pain while sitting in chair, has pain and distention across abdomen on exam. WBC increased today to 32,000, on ciprofloxacin. Lactate normal. Will check abdominal XR to evaluate position of NGT as minimal output with persistent distention. At this point no indication for emergent surgical intervention. I discussed that we may need to repeat CT to evaluate interval changes if clinical change or abdominal pain worsening and that surgery may be indicated pending findings. She would want surgery if that is the only option for treatment.     PLAN:  NPO, NGT decompression  - add flagyl for possible enteritis  - abdominal XR now to evaluate position of NGT  - possibly repeat abdominal CT (noncontrast given renal function) if exam changes - will follow closely along  - dulcolax suppository         Requesting Physician:      Dr. Vieyra        Chief Complaint:     Chief Complaint   Patient presents with     Nausea & Vomiting          History of Present Illness:   Melissa Alfaro is a 85 year old female who was seen in consultation at the request of Dr. Vieyra who presented with abdominal pain and nausea. She reports she is typically very active, walking all day and yesterday began to feel bloated and having abdominal pain with nausea. No emesis. She reports she woke up yesterday morning and had pain. The pain is improved now, she reports she has regular daily bowel movements.  "She is not sure when last one was. No nausea currently. NGT bilious with 300ml out since placement.            Physical Exam:   Blood pressure 130/61, pulse 84, temperature 97.7  F (36.5  C), temperature source Oral, resp. rate 16, height 1.549 m (5' 1\"), weight 45.7 kg (100 lb 12 oz), SpO2 95 %.  100 lbs 12 oz  General: sitting up in chair, appears comfortable  Psych: Alert and Oriented.  Normal affect  Neurological: grossly intact  Eyes: Sclera clear  Respiratory:  nonlabored breathing  Cardiovascular:  Regular Rate and Rhythm   GI: abdomen is distended, tender to palpation across mid abdomen, no hernias, voluntary guarding present, no involuntary guarding or rebound  Lymphatic/Hematologic/Immune:  No femoral or cervical lymphadenopathy.  Integumentary:  No rashes         Past Medical History:     Past Medical History:   Diagnosis Date     Anxiety      Benign neoplasm of colon      Carotid stenosis, asymptomatic      COPD (chronic obstructive pulmonary disease) (H)      Corns and callosities      Dermatophytosis of nail      Essential hypertension      History of atrial fibrillation      Hyperlipidemia      Hyperlipidemia      Insomnia      Irritable bowel syndrome      Nocturia      Plantar fascial fibromatosis      Primary localized osteoarthrosis of the hip      Pulmonary hypertension (H)      Sprain of lumbar region      Vitamin D deficiency      VSD (ventricular septal defect and aortic arch hypoplasia             Past Surgical History:     Past Surgical History:   Procedure Laterality Date     APPENDECTOMY       AS TOTAL HIP ARTHROPLASTY              Current Medications:           sodium chloride 0.9%  500 mL Intravenous Once     amLODIPine  10 mg Oral Daily     arformoterol  15 mcg Nebulization BID     atenolol  50 mg Oral Daily     budesonide  0.5 mg Nebulization BID     ciprofloxacin  400 mg Intravenous Q24H     sodium chloride (PF)  3 mL Intracatheter Q8H     umeclidinium  1 puff Inhalation Daily "       albuterol, HYDROmorphone, hypromellose-dextran, lidocaine 4%, lidocaine (buffered or not buffered), melatonin, naloxone, ondansetron, ondansetron **OR** ondansetron, potassium chloride, potassium chloride with lidocaine, potassium chloride, potassium chloride, potassium chloride, sodium chloride (PF)         Home Medications:     Prior to Admission medications    Medication Sig Last Dose Taking? Auth Provider   ACETAMINOPHEN PO Take 325-650 mg by mouth every 6 hours as needed for pain 8/4/2019 at AM Yes Reported, Patient   albuterol (PROAIR HFA/PROVENTIL HFA/VENTOLIN HFA) 108 (90 Base) MCG/ACT Inhaler Inhale 1-2 puffs into the lungs every 4 hours as needed for shortness of breath / dyspnea or wheezing  at PRN Yes Reported, Patient   amLODIPine (NORVASC) 10 MG tablet Take 10 mg by mouth daily 8/4/2019 at AM Yes Unknown, Entered By History   arformoterol (BROVANA) 15 MCG/2ML NEBU neb solution Take 15 mcg by nebulization 2 times daily 8/3/2019 at PM Yes Unknown, Entered By History   atenolol (TENORMIN) 50 MG tablet Take 50 mg by mouth daily 8/4/2019 at AM Yes Unknown, Entered By History   budesonide (PULMICORT) 0.5 MG/2ML neb solution Take 0.5 mg by nebulization 2 times daily 8/3/2019 at PM Yes Unknown, Entered By History   glycerin-hypromellose- (ARTIFICIAL TEARS) 0.2-0.2-1 % SOLN ophthalmic solution Place 1 drop into both eyes 4 times daily as needed  8/3/2019 at Unknown time Yes Reported, Patient   tiotropium (SPIRIVA) 18 MCG capsule Inhale 18 mcg into the lungs daily  8/3/2019 at PM Yes Reported, Patient   vitamin D3 (CHOLECALCIFEROL) 68815 units capsule Take 50,000 Units by mouth once a week On Monday. 7/29/2019 Yes Unknown, Entered By History            Allergies:     Allergies   Allergen Reactions     No Clinical Screening - See Comments Shortness Of Breath     With some mold spores  Other reaction(s): Other  Heart races with antihistamines     Diphtheria-Tetanus Toxoids      Other reaction(s):  Other  Red swollen arm     Epinephrine      Other reaction(s): Other  Heart rate increases & her body shakes & trembles     Penicillin G      Other reaction(s): Other (Specify in Comments)            Family History:     Family History   Problem Relation Age of Onset     Chronic Obstructive Pulmonary Disease Mother      Cerebrovascular Disease Mother      Cancer Father      Heart Disease Father      Seizure Disorder Father      Osteoporosis Sister      Lung Cancer Brother            Social History:   Melissa Alfaro            Review of Systems:   The 10 point Review of Systems is negative other than noted in the HPI.         Labs/Imaging   All new lab and imaging data was reviewed.   I have personally reviewed the imaging studies including her abdominal CT.         Avani Valente MD

## 2019-08-05 NOTE — CONSULTS
"CLINICAL NUTRITION SERVICES  -  ASSESSMENT NOTE      Future/Additional Recommendations:     Nutrition supplement once diet advanced     Malnutrition: (8/5)  % Weight Loss:  > 2% in 1 week (severe malnutrition)  % Intake:  Decreased intake does not meet criteria for malnutrition (has been 4 days)  Subcutaneous Fat Loss:  None observed  Muscle Loss:  Clavicle bone region  - mild depletion and Acromion bone region  - mild depletion  Fluid Retention:  None noted    Malnutrition Diagnosis: Non-Severe malnutrition  In Context of:  Acute illness or injury         REASON FOR ASSESSMENT  Melissa Alfaro is a 85 year old female seen by Registered Dietitian for Admission Nutrition Risk Screen for reduced oral intake over the last month      NUTRITION HISTORY  Visited with pt this afternoon  Pt notes that she was eating fine/normally prior to this episode  \"I was eating a regular diet\"  Notes that as she has gotten older, her appetite has gone down  \"I am following my mother's footsteps, she started eating smaller portions too\"  Pt has been drinking 1 nutrition supplement per day - \"I like all the flavors\"    Po intake was decreased for 2 days PTA      CURRENT NUTRITION ORDERS  Diet Order:     NPO    Pt eating ice chips  Has NG LIS      NUTRITION FOCUSED PHYSICAL ASSESSMENT FOR DIAGNOSING MALNUTRITION)  Completed:  Yes Partial assessment  (upper body)         Observed:    Muscle wasting (refer to documentation in Malnutrition section)   NG    Obtained from Chart/Interdisciplinary Team:  No edema    ANTHROPOMETRICS  Height: 5' 1\"  Weight:(8/5) 45.7 kg / 100 lbs 12 oz  Body mass index is 19.04 kg/m .  Weight Status:  Normal BMI  IBW: 47.7 kg  % IBW: 96%  Weight History: Pt states her usual wt is ~103#.  Has always been thin.  Has noticed gradual muscle loss with aging.  Wt is down ~3# (3%) with this illness.  Wt Readings from Last 10 Encounters:   08/05/19 45.7 kg (100 lb 12 oz)   05/18/18 46.5 kg (102 lb 9.6 oz)   05/08/18 48.1 " kg (106 lb)   05/01/18 46.8 kg (103 lb 3.2 oz)         LABS  Labs reviewed    MEDICATIONS  Medications reviewed      ASSESSED NUTRITION NEEDS PER APPROVED PRACTICE GUIDELINES:    Dosing Weight (8/5) 45.7 kg  Estimated Energy Needs: 7453-2693 kcals (25-30 Kcal/Kg)  Justification: maintenance  Estimated Protein Needs: 55-70 grams protein (1.2-1.5 g pro/Kg)  Justification: Repletion  Estimated Fluid Needs: 4073-6070  mL (1 mL/Kcal)  Justification: maintenance    MALNUTRITION:  % Weight Loss:  > 2% in 1 week (severe malnutrition)  % Intake:  Decreased intake does not meet criteria for malnutrition (has been 4 days)  Subcutaneous Fat Loss:  None observed  Muscle Loss:  Clavicle bone region  - mild depletion and Acromion bone region  - mild depletion  Fluid Retention:  None noted    Malnutrition Diagnosis: Non-Severe malnutrition  In Context of:  Acute illness or injury    NUTRITION DIAGNOSIS:  Inadequate protein-energy intake related to NPO status with SBO as evidenced by pt not meeting est nutrition needs      NUTRITION INTERVENTIONS  Recommendations / Nutrition Prescription  NPO (diet per MD)  Nutrition supplement once diet advanced  .      Implementation  Nutrition education ---> Reviewed current diet order and available supplements  Once diet advanced, will send a Boost supplement once daily  .      Nutrition Goals  Pt to receive nutrition in the next 2-3 days  .      MONITORING AND EVALUATION:  Progress towards goals will be monitored and evaluated per protocol and Practice Guidelines

## 2019-08-05 NOTE — PROGRESS NOTES
[facility]    Hospitalist Progress Note    Assessment & Plan   Melissa Alfaro is a 85 year old female with PMHx of hypertension, hyperlipidemia, paroxysmal afib (not on chronic anticoagulation), COPD, pulmonary hypertension, COPD, carotid stenosis, IBS, insomonia and anxiety among other medical problems who was admitted on 8/4/2019 with abdominal pain secondary to SBO vs ileus.    Patient recently moved from [place], [place] to the [facility] to live with her daughter. Most of her previous care was through [facility].    Partial SBO vs Ileus  Presented with complaints of abdominal pain that began the morning of admission. Some nausea but no vomiting. No diarrhea. In ED was afebrile and hemodynamically stable. WBC 28. LFTs, lactate and lipase all nl. CT abd/pelvis obtained and showed multiple dilated loops of small bowel in the lower abd with no discrete transition point -- suggestive of partial SBP vs ileus and mild to mod intraabdominal and pelvis ascites with mild wall thickening of the ascending colon and several loops of small bowel (suggestive of enteritis vs colitis). NGT placed. Initiated on bowel rest and supportive cares with IVFs, antiemetics, pain meds and antibiotics.   -- ongoing pain and abd distension this morning; afebrile but WBC increased: 28 -- 32.6  -- given worsening leukocytosis and renal function with ongoing pain, will check lactate this morning  -- cont NPO and NG to LIS  -- gen surgery consulted  -- cont IV Cipro and other supportive cares as below    SANDRA  Suspected Pyelonephritis  Baseline Cr seems to be around 0.9 - 1.1.  Cr 1.28 on presentation. No reported hx of CKD. Noted on CT to have horseshoe shaped kidneys and some perinephric stranding suggestive of pyelonephritis. UA on admission showed lg leuk est with 21 WBCs, 3 RBCs and few squam cells.  -- Cr trending up despite IVFs: 1.28 -- 1.68 today  -- per RN, urine output has been low -- give an additional 500ml fluid  "bolus this morning and cont IVFs with NS @100ml/h (will need to monitor respiratory status given hx of severe diastolic dysfunction)  -- urine culture and blood culture from addmission pending, conts on IV Cipro for now (noted PCN allergy, cause hives and edema)     Hypertension  Hyperlipidemia  Paroxysmal Afib  Chronic diastolic dysfunction (reported as severe on last echo in 4/2018)  Last echo in 4/2019 showed probable severe diastolic dysfunction of the left ventricle, EF 65-70%, mod to severe LAE and mild MR; RVSF nl.  PTA meds: atenolol 50mg daily, amlodipine 10mg daily. Not on chronic anticoagulation, aspirin or statin  --atenolol and amlodipine continued on admission (BPs stable)    COPD  Chronic and stable on home inhalers, including aformoterol, budesonide, tiotropium and prn albuterol. No s/sx of exacerbation  -- monitor respiratory status, currently stable on 1L NC    FEN: following bolus this morning will resume NS @100ml/h -- may need to increase rate if urine output poor but need to monitor respiratory status, lytes stable, NPO for now  DVT Prophylaxis: PCDs  Code Status: Full Code     Disposition: Discharge date unclear, pending clinical improvement and stable renal function -- suspect 3+ days still.    Lisa Vieyra    Interval History   Seen this morning. Endorses ongoing abdominal pain and \"soreness\", sachin with activity. Ongoing abd distension. No flatus thus far. Urine output has been low. Denies cp/sob/cough.     -Data reviewed today: I reviewed all new labs and imaging results over the last 24 hours. I personally reviewed no images or EKG's today.    Physical Exam   Temp: 97.7  F (36.5  C) Temp src: Oral BP: 130/61 Pulse: 84 Heart Rate: 85 Resp: 16 SpO2: 96 % O2 Device: Nasal cannula Oxygen Delivery: 1 LPM  Vitals:    08/04/19 1140 08/05/19 0500   Weight: 38.6 kg (85 lb) 45.7 kg (100 lb 12 oz)     Vital Signs with Ranges  Temp:  [97.4  F (36.3  C)-97.7  F (36.5  C)] 97.7  F (36.5 "  C)  Pulse:  [73-84] 84  Heart Rate:  [73-85] 85  Resp:  [16-18] 16  BP: (124-151)/(46-68) 130/61  FiO2 (%):  [95 %] 95 %  SpO2:  [92 %-98 %] 96 %  I/O last 3 completed shifts:  In: 4264 [I.V.:3264; IV Piggyback:1000]  Out: 500 [Urine:200; Emesis/NG output:300]    Constitutional: Appears uncomfortable but NAD, alert and answering questions appropriately  Respiratory: BS diminished at bilateral lung bases but no wheeze/rales/rhonchi, no increased work of breathing  Cardiovascular: HRRR, no MGR, no LE edema  GI: distended and TTP, minimal BS  Skin/Integumen: warm/dry  Other: +NG tube in place with green bilious output    Medications     sodium chloride       sodium chloride 100 mL/hr at 08/05/19 0030       amLODIPine  10 mg Oral Daily     arformoterol  15 mcg Nebulization BID     atenolol  50 mg Oral Daily     budesonide  0.5 mg Nebulization BID     ciprofloxacin  400 mg Intravenous Q24H     sodium chloride (PF)  3 mL Intracatheter Q8H     umeclidinium  1 puff Inhalation Daily       Data   Recent Labs   Lab 08/05/19  0925 08/04/19  1151   WBC 32.6* 28.0*   HGB 10.7* 12.3   MCV 90 89    377   NA  --  137   POTASSIUM  --  4.4   CHLORIDE  --  101   CO2  --  25   BUN  --  33*   CR  --  1.28*   ANIONGAP  --  11   LUIS ENRIQUE  --  9.6   GLC  --  199*   ALBUMIN  --  3.8   PROTTOTAL  --  7.6   BILITOTAL  --  0.6   ALKPHOS  --  58   ALT  --  21   AST  --  22   LIPASE  --  131       Recent Results (from the past 24 hour(s))   CT Abdomen Pelvis w Contrast    Narrative    CT ABDOMEN AND PELVIS WITH CONTRAST   8/4/2019 1:34 PM     HISTORY: Abdominal pain, unspecified.    TECHNIQUE:  CT abdomen and pelvis with 43 mL Isovue-370 IV. Radiation  dose for this scan was reduced using automated exposure control,  adjustment of the mA and/or kV according to patient size, or iterative  reconstruction technique.    COMPARISON: None available.    FINDINGS: Mild subsegmental atelectasis seen in the bilateral lung  bases.    No focal hepatic  lesion is seen. Mild nodular contour of the liver. No  intrahepatic biliary ductal dilatation is present. The common bile  duct measures up to 7 mm, likely within the normal range given  patient's age. Gallbladder is distended measuring up to 4.1 cm.    The spleen, adrenal glands, and pancreas are unremarkable. 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  Scattered areas of wedge-shaped hypoattenuation seen bilaterally  spanning from pelvis to cortex measuring up to 1.5 cm on the left to  0.8 cm on the right. A 5.2 cm cyst is noted arising from the midpole  of the right kidney. No significant perirenal stranding is present.    Stomach is moderately distended with fluid and air. Multiple dilated  loops of small bowel seen in the lower abdomen with several also  demonstrating increased wall thickening. A loop in the right lower  abdomen measures up to 3.1 cm in diameter. No discrete transition  point is identified. Multiple colonic diverticula seen in the sigmoid  colon. Mild wall thickening also noted in the ascending colon and  cecum within the right hemiabdomen (series 3, image 34). Mild to  moderate intra-abdominal and pelvic ascites is present.    Scattered atherosclerotic calcification seen in the abdominal aorta.  IVC is partially collapsed. No retroperitoneal or mesenteric  lymphadenopathy is seen.    Urinary bladder is mildly distended. Uterus is unremarkable.  Evaluation of the pelvis is limited due to streak artifact from right  hip replacement. No definite pelvic lymphadenopathy is seen.    Diffuse osteopenia seen of the bones. Multilevel degenerative changes  present in the spine. Right hip arthroplasty noted.      Impression    IMPRESSION:  1.  Multiple dilated loops of small bowel seen in the lower abdomen  with no discrete transition point seen. Findings suggestive of partial  small bowel obstruction versus less likely ileus. Colonic loops are  not distended.  2.   Wedge-shaped areas of hypoattenuation seen in the horseshoe shaped  kidneys. Although no perinephric stranding is seen surrounding the  kidneys, findings may suggest pyelonephritis. Correlate with  urinalysis.  3.  Mild to moderate intra-abdominal and pelvic ascites. Mild wall  thickening in the ascending colon as well as several loops of small  bowel likely reactive to surrounding ascites. Correlate with clinical  symptoms for enteritis or colitis.    SERVANDO LANDON MD

## 2019-08-06 LAB
ANION GAP SERPL CALCULATED.3IONS-SCNC: 7 MMOL/L (ref 3–14)
BUN SERPL-MCNC: 40 MG/DL (ref 7–30)
CALCIUM SERPL-MCNC: 8.4 MG/DL (ref 8.5–10.1)
CHLORIDE SERPL-SCNC: 112 MMOL/L (ref 94–109)
CO2 SERPL-SCNC: 22 MMOL/L (ref 20–32)
CREAT SERPL-MCNC: 1.24 MG/DL (ref 0.52–1.04)
ERYTHROCYTE [DISTWIDTH] IN BLOOD BY AUTOMATED COUNT: 15 % (ref 10–15)
GFR SERPL CREATININE-BSD FRML MDRD: 39 ML/MIN/{1.73_M2}
GLUCOSE BLDC GLUCOMTR-MCNC: 108 MG/DL (ref 70–99)
GLUCOSE BLDC GLUCOMTR-MCNC: 112 MG/DL (ref 70–99)
GLUCOSE BLDC GLUCOMTR-MCNC: 125 MG/DL (ref 70–99)
GLUCOSE BLDC GLUCOMTR-MCNC: 136 MG/DL (ref 70–99)
GLUCOSE BLDC GLUCOMTR-MCNC: 94 MG/DL (ref 70–99)
GLUCOSE SERPL-MCNC: 127 MG/DL (ref 70–99)
HCT VFR BLD AUTO: 30.4 % (ref 35–47)
HGB BLD-MCNC: 9.7 G/DL (ref 11.7–15.7)
LACTATE BLD-SCNC: 1 MMOL/L (ref 0.7–2)
MCH RBC QN AUTO: 28.7 PG (ref 26.5–33)
MCHC RBC AUTO-ENTMCNC: 31.9 G/DL (ref 31.5–36.5)
MCV RBC AUTO: 90 FL (ref 78–100)
PLATELET # BLD AUTO: 264 10E9/L (ref 150–450)
POTASSIUM SERPL-SCNC: 4 MMOL/L (ref 3.4–5.3)
RBC # BLD AUTO: 3.38 10E12/L (ref 3.8–5.2)
SODIUM SERPL-SCNC: 141 MMOL/L (ref 133–144)
WBC # BLD AUTO: 24.8 10E9/L (ref 4–11)

## 2019-08-06 PROCEDURE — 25000128 H RX IP 250 OP 636: Performed by: SURGERY

## 2019-08-06 PROCEDURE — 25000125 ZZHC RX 250: Performed by: INTERNAL MEDICINE

## 2019-08-06 PROCEDURE — 80048 BASIC METABOLIC PNL TOTAL CA: CPT | Performed by: INTERNAL MEDICINE

## 2019-08-06 PROCEDURE — 94640 AIRWAY INHALATION TREATMENT: CPT | Mod: 76

## 2019-08-06 PROCEDURE — 99233 SBSQ HOSP IP/OBS HIGH 50: CPT | Performed by: INTERNAL MEDICINE

## 2019-08-06 PROCEDURE — 25000132 ZZH RX MED GY IP 250 OP 250 PS 637: Performed by: INTERNAL MEDICINE

## 2019-08-06 PROCEDURE — 25000128 H RX IP 250 OP 636: Performed by: INTERNAL MEDICINE

## 2019-08-06 PROCEDURE — 40000275 ZZH STATISTIC RCP TIME EA 10 MIN

## 2019-08-06 PROCEDURE — 12000000 ZZH R&B MED SURG/OB

## 2019-08-06 PROCEDURE — 00000146 ZZHCL STATISTIC GLUCOSE BY METER IP

## 2019-08-06 PROCEDURE — 25800030 ZZH RX IP 258 OP 636: Performed by: INTERNAL MEDICINE

## 2019-08-06 PROCEDURE — 36415 COLL VENOUS BLD VENIPUNCTURE: CPT | Performed by: INTERNAL MEDICINE

## 2019-08-06 PROCEDURE — 85027 COMPLETE CBC AUTOMATED: CPT | Performed by: INTERNAL MEDICINE

## 2019-08-06 PROCEDURE — 94640 AIRWAY INHALATION TREATMENT: CPT

## 2019-08-06 PROCEDURE — 25000132 ZZH RX MED GY IP 250 OP 250 PS 637: Performed by: SURGERY

## 2019-08-06 PROCEDURE — 83605 ASSAY OF LACTIC ACID: CPT | Performed by: INTERNAL MEDICINE

## 2019-08-06 RX ORDER — BISACODYL 10 MG
10 SUPPOSITORY, RECTAL RECTAL ONCE
Status: COMPLETED | OUTPATIENT
Start: 2019-08-06 | End: 2019-08-06

## 2019-08-06 RX ORDER — IPRATROPIUM BROMIDE AND ALBUTEROL SULFATE 2.5; .5 MG/3ML; MG/3ML
3 SOLUTION RESPIRATORY (INHALATION) EVERY 4 HOURS PRN
Status: DISCONTINUED | OUTPATIENT
Start: 2019-08-06 | End: 2019-08-07

## 2019-08-06 RX ADMIN — SODIUM CHLORIDE: 9 INJECTION, SOLUTION INTRAVENOUS at 13:35

## 2019-08-06 RX ADMIN — DIATRIZOATE MEGLUMINE AND DIATRIZOATE SODIUM 90 ML: 660; 100 SOLUTION ORAL; RECTAL at 08:48

## 2019-08-06 RX ADMIN — ARFORMOTEROL TARTRATE 15 MCG: 15 SOLUTION RESPIRATORY (INHALATION) at 06:40

## 2019-08-06 RX ADMIN — Medication 10 MG: at 08:51

## 2019-08-06 RX ADMIN — METRONIDAZOLE 500 MG: 500 INJECTION, SOLUTION INTRAVENOUS at 06:22

## 2019-08-06 RX ADMIN — ATENOLOL 50 MG: 50 TABLET ORAL at 08:51

## 2019-08-06 RX ADMIN — METRONIDAZOLE 500 MG: 500 INJECTION, SOLUTION INTRAVENOUS at 13:28

## 2019-08-06 RX ADMIN — METRONIDAZOLE 500 MG: 500 INJECTION, SOLUTION INTRAVENOUS at 20:31

## 2019-08-06 RX ADMIN — BUDESONIDE 0.5 MG: 0.5 INHALANT RESPIRATORY (INHALATION) at 18:55

## 2019-08-06 RX ADMIN — HYDROMORPHONE HYDROCHLORIDE 0.3 MG: 1 INJECTION, SOLUTION INTRAMUSCULAR; INTRAVENOUS; SUBCUTANEOUS at 01:23

## 2019-08-06 RX ADMIN — METRONIDAZOLE 500 MG: 500 INJECTION, SOLUTION INTRAVENOUS at 01:46

## 2019-08-06 RX ADMIN — AMLODIPINE BESYLATE 10 MG: 10 TABLET ORAL at 08:51

## 2019-08-06 RX ADMIN — BUDESONIDE 0.5 MG: 0.5 INHALANT RESPIRATORY (INHALATION) at 06:40

## 2019-08-06 RX ADMIN — ARFORMOTEROL TARTRATE 15 MCG: 15 SOLUTION RESPIRATORY (INHALATION) at 18:55

## 2019-08-06 RX ADMIN — UMECLIDINIUM 1 PUFF: 62.5 AEROSOL, POWDER ORAL at 17:16

## 2019-08-06 RX ADMIN — IPRATROPIUM BROMIDE AND ALBUTEROL SULFATE 3 ML: .5; 3 SOLUTION RESPIRATORY (INHALATION) at 08:00

## 2019-08-06 RX ADMIN — CIPROFLOXACIN 400 MG: 2 INJECTION, SOLUTION INTRAVENOUS at 17:16

## 2019-08-06 ASSESSMENT — MIFFLIN-ST. JEOR: SCORE: 840.38

## 2019-08-06 ASSESSMENT — ACTIVITIES OF DAILY LIVING (ADL)
ADLS_ACUITY_SCORE: 16

## 2019-08-06 NOTE — PLAN OF CARE
DATE & TIME: 8/6/19 9644-6934   Cognitive Concerns/ Orientation : Disoriented to place and situation   BEHAVIOR & AGGRESSION TOOL COLOR: Green  CIWA SCORE: N/A   ABNL VS/O2: VSS on 2 LPM via NC  MOBILITY: Ax1 with gait belt   PAIN MANAGMENT: Denies pain this shift, has generalized all over discomfort.   DIET: NPO ex ice chips and meds  BOWEL/BLADDER: Voided 300 ml this shift. Small amount of mucous post suppository administration.  ABNL LAB/BG:   DRAIN/DEVICES: PIV infusion at 50 ml/hr, abx given; NG tube in place with about 800mL out this shift.    TELEMETRY RHYTHM: N/A  SKIN: Intact  TESTS/PROCEDURES: N/A  D/C DAY/GOALS/PLACE: Pending resolution of SBO  OTHER IMPORTANT INFO: BS hypoactive, abdomen tender.  Dyspneic with any movement, complains of shortness of breath while up in chair, improved with 2L of O2.

## 2019-08-06 NOTE — PROGRESS NOTES
[facility]    Hospitalist Progress Note    Assessment & Plan   Melissa Alfaro is a 85 year old female with PMHx of hypertension, hyperlipidemia, paroxysmal afib (not on chronic anticoagulation), COPD, pulmonary hypertension, COPD, carotid stenosis, IBS, insomonia and anxiety among other medical problems who was admitted on 2019 with abdominal pain secondary to SBO vs ileus.    Patient recently moved from [place], [place] to the [facility] to live with her daughter. Most of her previous care was through [facility].    SBO  Presented with complaints of abdominal pain that began the morning of admission. Some nausea but no vomiting. No diarrhea. In ED was afebrile and hemodynamically stable. WBC 28. LFTs, lactate and lipase all nl. CT abd/pelvis obtained and showed multiple dilated loops of small bowel in the lower abd with no discrete transition point -- suggestive of partial SBP vs ileus and mild to mod intraabdominal and pelvis ascites with mild wall thickening of the ascending colon and several loops of small bowel (suggestive of enteritis vs colitis). NGT placed. Initiated on bowel rest and supportive cares with IVFs, antiemetics, pain meds and antibiotics.   Had ongoing pain and abd distension on  with worsening leukocytosis (WBC 28 -- 32.6), serial lactates nl.    -- remains afebrile and hemodynamically stable, WBC improvin.6 -- 24.8 today  -- general surgery consulted -- agreed with ongoing NGT decompression and antibiotics  -- to have gastrografin study today  -- cont NPO, cont NGT to LIS  -- cont IV Cipro and IV Flagyl  -- cont other supportive cares as below    SANDRA: Improved  Baseline Cr seems to be around 0.9 - 1.1. No reported hx of CKD. Cr 1.28 on presentation.   Cr initially trended up despite IVFs, peaked at 1.68 on . At this time, urine output was noted to be low per RN so was given an additional 500ml fluid bolus  -- Cr now trending down: 1.68 -- 1.24 today  -- cont  maintenance fluids as below but watch for sx of volume overload dt hx of severe diastolic dysfunction    Possible Pyelonephritis  Noted on CT to have horseshoe shaped kidneys and some perinephric stranding suggestive of pyelonephritis. UA on admission showed lg leuk est with 21 WBCs, 3 RBCs and few squam cells. Per RN, urine output initially low -- as she had worsening renal function on 8/5, she was given an additional 500ml fluid bolus this morning and cont IVFs with NS @100ml/h (will need to monitor respiratory status given hx of severe diastolic dysfunction)  -- place on IV Cipro on admission dt PCN allergy (causes hives/edema)  -- urine culture drawn on admission now growing 50-100k strep agalactiae (sensitive to PCNs, Vanco and cephalosporins) -- may need to consider changing antibiotic but unclear how much sx contributing to current condition  -- blood culture drawn on admission remains negative    Hypertension  Hyperlipidemia  Paroxysmal Afib  Chronic diastolic dysfunction (reported as severe on last echo in 4/2018)  Last echo in 4/2018 showed probable severe diastolic dysfunction of the left ventricle, EF 65-70%, mod to severe LAE and mild MR; RVSF nl.  PTA meds: atenolol 50mg daily, amlodipine 10mg daily. Not on chronic anticoagulation, aspirin or statin  -- atenolol and amlodipine continued on admission (BPs stable)  -- requiring additional IVFs this stay dt above and need to monitor closely for potential volume overload -- endorsed mildly increased dyspnea on 8/6 AM and O2 increased from 1L NC to 2L NC and had faint crackles over L base so rate of NS was decreased as below    COPD  Chronic and stable on home inhalers, including aformoterol, budesonide, tiotropium and prn albuterol. No s/sx of exacerbation  -- monitor respiratory status    FEN: given feelings of dyspnea and mildly increased O2 needs will decrease NS from 100ml/h to 50ml/h, lytes stable, NPO for now  DVT Prophylaxis: PCDs  Code Status: Full  Code     Disposition: Discharge date unclear, pending clinical improvement, stable respiratory status and stable renal function -- suspect 3+ days still.    Lisa Vieyra    Interval History   Seen this morning. Endorses feeling more short of breath and O2 increased from 1L to 2L NC. No cough. NG remains in place to LIS. No reported nausea. Ongoing abd discomfort but looks more comfortable today. Minimal urine output per RN.    -Data reviewed today: I reviewed all new labs and imaging results over the last 24 hours. I personally reviewed no images or EKG's today.    Physical Exam   Temp: 98.1  F (36.7  C) Temp src: Oral BP: 133/61   Heart Rate: 80 Resp: 20 SpO2: 92 % O2 Device: Nasal cannula Oxygen Delivery: 1 LPM  Vitals:    08/04/19 1140 08/05/19 0500 08/06/19 0701   Weight: 38.6 kg (85 lb) 45.7 kg (100 lb 12 oz) 45.8 kg (100 lb 15.5 oz)     Vital Signs with Ranges  Temp:  [97.8  F (36.6  C)-98.2  F (36.8  C)] 98.1  F (36.7  C)  Heart Rate:  [79-94] 80  Resp:  [16-24] 20  BP: (118-145)/(48-61) 133/61  SpO2:  [88 %-96 %] 92 %  I/O last 3 completed shifts:  In: 3086 [P.O.:50; I.V.:3036]  Out: 850 [Urine:550; Emesis/NG output:300]    Constitutional: Resting quietly in bed, alert, answering questions appropriately, NAD  Respiratory: BS diminished at bilateral lung bases with faint crackles over LLL, no wheeze, no increased work of breathing  Cardiovascular: HRRR, no MGR, no LE edema  GI: distended and TTP, minimal BS  Skin/Integumen: warm/dry  Other: +NG tube in place    Medications     sodium chloride 100 mL/hr at 08/05/19 2303       amLODIPine  10 mg Oral Daily     arformoterol  15 mcg Nebulization BID     atenolol  50 mg Oral Daily     bisacodyl  10 mg Rectal Once     budesonide  0.5 mg Nebulization BID     ciprofloxacin  400 mg Intravenous Q24H     diatrizoate meglumine-sodium  90 mL Per NG tube Once     metroNIDAZOLE  500 mg Intravenous Q6H     sodium chloride (PF)  3 mL Intracatheter Q8H      umeclidinium  1 puff Inhalation Daily       Data   Recent Labs   Lab 08/06/19  0649 08/05/19  0925 08/04/19  1151   WBC 24.8* 32.6* 28.0*   HGB 9.7* 10.7* 12.3   MCV 90 90 89    305 377    140 137   POTASSIUM 4.0 4.5 4.4   CHLORIDE 112* 107 101   CO2 22 23 25   BUN 40* 44* 33*   CR 1.24* 1.68* 1.28*   ANIONGAP 7 10 11   LUIS ENRIQUE 8.4* 8.3* 9.6   * 147* 199*   ALBUMIN  --   --  3.8   PROTTOTAL  --   --  7.6   BILITOTAL  --   --  0.6   ALKPHOS  --   --  58   ALT  --   --  21   AST  --   --  22   LIPASE  --   --  131       Recent Results (from the past 24 hour(s))   XR Abdomen 1 View    Narrative    ABDOMEN ONE VIEW 8/5/2019 12:43 PM     HISTORY: Evaluate NGT placement and interval changes.      Impression    IMPRESSION: Nasogastric tube tip extends to the gastric antrum. There  are multiple loops of gas distended bowel. Although fecal debris is  present within the colon, small bowel is distended out of proportion  to the colon, worrisome for small bowel obstruction. Contrast is  present within the urinary bladder from the recent CT.    SULEMAN BAUM MD

## 2019-08-06 NOTE — PROGRESS NOTES
Surgery    Pt sitting up in chair.   Reports abdominal pain better  Denies nausea, no flatus    B/P: 134/59, T: 98, P: 84, R: 20  Abd: examined sitting up in chair.  Mild diffuse tenderness, distended, tympanic to percussion    A/P: SBO vs ileus secondary to pyelonephritis  - continue conservative management  - continue NG  - continue NPO  - Wbc improving  - consider repeat CT if symptoms worsen    Jocelyn Daniel PA-C     Addendum: will try gastrograffin challenge today. Follow up XR tomorrow am. Orders placed.     Avani Valente MD  [facility]  [phone]

## 2019-08-06 NOTE — PLAN OF CARE
"DATE & TIME: 8/5/19 0035-6389                  Cognitive Concerns/ Orientation : Disoriented to place and situation   BEHAVIOR & AGGRESSION TOOL COLOR: Green  CIWA SCORE: N/A   ABNL VS/O2: VSS on 1 LPM via NC  MOBILITY: Ax1  PAIN MANAGMENT: PRN IV Dilaudid x1 for abdominal pain  DIET: NPO ex ice chips and meds  BOWEL/BLADDER: Retains urine and has not voided overnight, scanned for 310mL at end of shift and does not feel an urge to urinate at this time.  One scant mucoid bowel movement.  ABNL LAB/BG: WBC up to 32.6 from 28, Creatinine 1.68, LA 1.8 yesterday; , 112, 108.  DRAIN/DEVICES: PIV infusion, abx given; NG tube in place with about 200mL out overnight, patient does pull at it and requires advancement by nurse  TELEMETRY RHYTHM: N/A  SKIN: Intact  TESTS/PROCEDURES: N/A  D/C DAY/GOALS/PLACE: Pending resolution of SBO  OTHER IMPORTANT INFO: BS hypoactive, initially tender in the LUQ and LLQ, later tender throughout prior to Dilaudid administration.  Dyspneic at end of shift with any movement in bed and tachpneic with sepsis risk triggered, awaiting lactic draw.  Lung sounds are clear throughout and she does not appear to be volume overloaded.  COPD: Respiratory therapy administering scheduled nebs at end of shift.  Micheal Yip RN    Addendum: LA 1.0.  WBC improved to 24.8.  Patient reports she didn't know she had an infection and that \"no body told me\" which are statements she's also used when asked to straighten her arm out so that her IV will infuse.  Informed her that she received two doses of antibiotics overnight, which was also communicated with her at the time of administration.  "

## 2019-08-06 NOTE — PLAN OF CARE
DATE & TIME: 8/6/19 6565-1743      Cognitive Concerns/ Orientation : A&Ox2, disoriented to place and situation; very forgetful  BEHAVIOR & AGGRESSION TOOL COLOR: Green  CIWA SCORE: N/A      ABNL VS/O2: VSS on 2 LPM via NC; c/o SOB this am, remains on 2L   MOBILITY: Ax1 with gait belt   PAIN MANAGMENT: Denies pain,but has generalized all over discomfort.   DIET: NPO ex ice chips and meds  BOWEL/BLADDER: Urinary retention, bladder scan 667, voided 300, repeat scan 200. No BM today  ABNL LAB/BG:   DRAIN/DEVICES: PIV infusing; NG tube to LIS, 650cc green output.   TELEMETRY RHYTHM: N/A  SKIN: Intact  TESTS/PROCEDURES: N/A  D/C DAY/GOALS/PLACE: Pending resolution of SBO  OTHER IMPORTANT INFO: BS hypoactive, abdomen tender, passing gas. Continues on IV abx.

## 2019-08-06 NOTE — PROGRESS NOTES
Patient on 1-2 L NC.  Felt more short of breath this am.  Lung sounds diminished w/ exp. Wheezes this am after scheduled Brovana/Pulmicort treatment.  MD text paged for a prn short acting bronchodilator.  Duoneb prn ordered and given.  Patient notes a little improvement in shortness of breath.    Continue to monitor.

## 2019-08-07 ENCOUNTER — ANESTHESIA EVENT (OUTPATIENT)
Dept: SURGERY | Facility: CLINIC | Age: 84
DRG: 330 | End: 2019-08-07
Payer: COMMERCIAL

## 2019-08-07 ENCOUNTER — APPOINTMENT (OUTPATIENT)
Dept: CT IMAGING | Facility: CLINIC | Age: 84
DRG: 330 | End: 2019-08-07
Attending: SURGERY
Payer: COMMERCIAL

## 2019-08-07 ENCOUNTER — ANESTHESIA (OUTPATIENT)
Dept: SURGERY | Facility: CLINIC | Age: 84
DRG: 330 | End: 2019-08-07
Payer: COMMERCIAL

## 2019-08-07 LAB
ABO + RH BLD: NORMAL
ABO + RH BLD: NORMAL
ANION GAP SERPL CALCULATED.3IONS-SCNC: 8 MMOL/L (ref 3–14)
BLD GP AB SCN SERPL QL: NORMAL
BLOOD BANK CMNT PATIENT-IMP: NORMAL
BUN SERPL-MCNC: 35 MG/DL (ref 7–30)
CALCIUM SERPL-MCNC: 9.5 MG/DL (ref 8.5–10.1)
CHLORIDE SERPL-SCNC: 115 MMOL/L (ref 94–109)
CO2 SERPL-SCNC: 24 MMOL/L (ref 20–32)
CREAT SERPL-MCNC: 1.13 MG/DL (ref 0.52–1.04)
ERYTHROCYTE [DISTWIDTH] IN BLOOD BY AUTOMATED COUNT: 15.1 % (ref 10–15)
GFR SERPL CREATININE-BSD FRML MDRD: 44 ML/MIN/{1.73_M2}
GLUCOSE BLDC GLUCOMTR-MCNC: 112 MG/DL (ref 70–99)
GLUCOSE SERPL-MCNC: 133 MG/DL (ref 70–99)
HCT VFR BLD AUTO: 31.1 % (ref 35–47)
HGB BLD-MCNC: 10 G/DL (ref 11.7–15.7)
MCH RBC QN AUTO: 28.9 PG (ref 26.5–33)
MCHC RBC AUTO-ENTMCNC: 32.2 G/DL (ref 31.5–36.5)
MCV RBC AUTO: 90 FL (ref 78–100)
PLATELET # BLD AUTO: 299 10E9/L (ref 150–450)
PLATELET # BLD AUTO: 349 10E9/L (ref 150–450)
POTASSIUM SERPL-SCNC: 3.7 MMOL/L (ref 3.4–5.3)
RBC # BLD AUTO: 3.46 10E12/L (ref 3.8–5.2)
SODIUM SERPL-SCNC: 147 MMOL/L (ref 133–144)
SPECIMEN EXP DATE BLD: NORMAL
WBC # BLD AUTO: 20.4 10E9/L (ref 4–11)

## 2019-08-07 PROCEDURE — 0DN80ZZ RELEASE SMALL INTESTINE, OPEN APPROACH: ICD-10-PCS | Performed by: SURGERY

## 2019-08-07 PROCEDURE — 86850 RBC ANTIBODY SCREEN: CPT | Performed by: ANESTHESIOLOGY

## 2019-08-07 PROCEDURE — 44120 REMOVAL OF SMALL INTESTINE: CPT | Performed by: SURGERY

## 2019-08-07 PROCEDURE — 71000013 ZZH RECOVERY PHASE 1 LEVEL 1 EA ADDTL HR: Performed by: SURGERY

## 2019-08-07 PROCEDURE — 40000275 ZZH STATISTIC RCP TIME EA 10 MIN

## 2019-08-07 PROCEDURE — 25000125 ZZHC RX 250: Performed by: PHYSICIAN ASSISTANT

## 2019-08-07 PROCEDURE — 25000566 ZZH SEVOFLURANE, EA 15 MIN: Performed by: SURGERY

## 2019-08-07 PROCEDURE — 12000000 ZZH R&B MED SURG/OB

## 2019-08-07 PROCEDURE — 85049 AUTOMATED PLATELET COUNT: CPT | Performed by: PHYSICIAN ASSISTANT

## 2019-08-07 PROCEDURE — 25000125 ZZHC RX 250: Performed by: ANESTHESIOLOGY

## 2019-08-07 PROCEDURE — 25000125 ZZHC RX 250: Performed by: NURSE ANESTHETIST, CERTIFIED REGISTERED

## 2019-08-07 PROCEDURE — 36000093 ZZH SURGERY LEVEL 4 1ST 30 MIN: Performed by: SURGERY

## 2019-08-07 PROCEDURE — 86901 BLOOD TYPING SEROLOGIC RH(D): CPT | Performed by: ANESTHESIOLOGY

## 2019-08-07 PROCEDURE — 88307 TISSUE EXAM BY PATHOLOGIST: CPT | Performed by: SURGERY

## 2019-08-07 PROCEDURE — 25800030 ZZH RX IP 258 OP 636: Performed by: PHYSICIAN ASSISTANT

## 2019-08-07 PROCEDURE — 25000128 H RX IP 250 OP 636: Performed by: PHYSICIAN ASSISTANT

## 2019-08-07 PROCEDURE — 37000009 ZZH ANESTHESIA TECHNICAL FEE, EACH ADDTL 15 MIN: Performed by: SURGERY

## 2019-08-07 PROCEDURE — 27210794 ZZH OR GENERAL SUPPLY STERILE: Performed by: SURGERY

## 2019-08-07 PROCEDURE — 99233 SBSQ HOSP IP/OBS HIGH 50: CPT | Performed by: INTERNAL MEDICINE

## 2019-08-07 PROCEDURE — 74176 CT ABD & PELVIS W/O CONTRAST: CPT

## 2019-08-07 PROCEDURE — 36415 COLL VENOUS BLD VENIPUNCTURE: CPT | Performed by: INTERNAL MEDICINE

## 2019-08-07 PROCEDURE — 37000008 ZZH ANESTHESIA TECHNICAL FEE, 1ST 30 MIN: Performed by: SURGERY

## 2019-08-07 PROCEDURE — 00000146 ZZHCL STATISTIC GLUCOSE BY METER IP

## 2019-08-07 PROCEDURE — 25000128 H RX IP 250 OP 636: Performed by: SURGERY

## 2019-08-07 PROCEDURE — 86900 BLOOD TYPING SEROLOGIC ABO: CPT | Performed by: ANESTHESIOLOGY

## 2019-08-07 PROCEDURE — 25000128 H RX IP 250 OP 636: Performed by: ANESTHESIOLOGY

## 2019-08-07 PROCEDURE — 44120 REMOVAL OF SMALL INTESTINE: CPT | Mod: AS | Performed by: PHYSICIAN ASSISTANT

## 2019-08-07 PROCEDURE — 71000012 ZZH RECOVERY PHASE 1 LEVEL 1 FIRST HR: Performed by: SURGERY

## 2019-08-07 PROCEDURE — 36415 COLL VENOUS BLD VENIPUNCTURE: CPT | Performed by: ANESTHESIOLOGY

## 2019-08-07 PROCEDURE — 25000125 ZZHC RX 250: Performed by: INTERNAL MEDICINE

## 2019-08-07 PROCEDURE — 36000063 ZZH SURGERY LEVEL 4 EA 15 ADDTL MIN: Performed by: SURGERY

## 2019-08-07 PROCEDURE — 36415 COLL VENOUS BLD VENIPUNCTURE: CPT | Performed by: PHYSICIAN ASSISTANT

## 2019-08-07 PROCEDURE — 88307 TISSUE EXAM BY PATHOLOGIST: CPT | Mod: 26 | Performed by: SURGERY

## 2019-08-07 PROCEDURE — 25800030 ZZH RX IP 258 OP 636: Performed by: ANESTHESIOLOGY

## 2019-08-07 PROCEDURE — 0DB80ZZ EXCISION OF SMALL INTESTINE, OPEN APPROACH: ICD-10-PCS | Performed by: SURGERY

## 2019-08-07 PROCEDURE — 40000170 ZZH STATISTIC PRE-PROCEDURE ASSESSMENT II: Performed by: SURGERY

## 2019-08-07 PROCEDURE — 25000128 H RX IP 250 OP 636: Performed by: NURSE ANESTHETIST, CERTIFIED REGISTERED

## 2019-08-07 PROCEDURE — 85027 COMPLETE CBC AUTOMATED: CPT | Performed by: INTERNAL MEDICINE

## 2019-08-07 PROCEDURE — 25000132 ZZH RX MED GY IP 250 OP 250 PS 637: Performed by: INTERNAL MEDICINE

## 2019-08-07 PROCEDURE — 94640 AIRWAY INHALATION TREATMENT: CPT | Mod: 76

## 2019-08-07 PROCEDURE — 94640 AIRWAY INHALATION TREATMENT: CPT

## 2019-08-07 PROCEDURE — 25000125 ZZHC RX 250: Performed by: SURGERY

## 2019-08-07 PROCEDURE — 25800030 ZZH RX IP 258 OP 636: Performed by: NURSE ANESTHETIST, CERTIFIED REGISTERED

## 2019-08-07 PROCEDURE — 80048 BASIC METABOLIC PNL TOTAL CA: CPT | Performed by: INTERNAL MEDICINE

## 2019-08-07 RX ORDER — SODIUM CHLORIDE, SODIUM LACTATE, POTASSIUM CHLORIDE, CALCIUM CHLORIDE 600; 310; 30; 20 MG/100ML; MG/100ML; MG/100ML; MG/100ML
INJECTION, SOLUTION INTRAVENOUS CONTINUOUS
Status: DISCONTINUED | OUTPATIENT
Start: 2019-08-07 | End: 2019-08-07 | Stop reason: HOSPADM

## 2019-08-07 RX ORDER — ONDANSETRON 4 MG/1
4 TABLET, ORALLY DISINTEGRATING ORAL EVERY 30 MIN PRN
Status: DISCONTINUED | OUTPATIENT
Start: 2019-08-07 | End: 2019-08-07 | Stop reason: HOSPADM

## 2019-08-07 RX ORDER — HYDRALAZINE HYDROCHLORIDE 20 MG/ML
2.5-5 INJECTION INTRAMUSCULAR; INTRAVENOUS EVERY 10 MIN PRN
Status: DISCONTINUED | OUTPATIENT
Start: 2019-08-07 | End: 2019-08-07 | Stop reason: HOSPADM

## 2019-08-07 RX ORDER — HEPARIN SODIUM 5000 [USP'U]/.5ML
5000 INJECTION, SOLUTION INTRAVENOUS; SUBCUTANEOUS EVERY 12 HOURS
Status: DISCONTINUED | OUTPATIENT
Start: 2019-08-08 | End: 2019-08-11

## 2019-08-07 RX ORDER — FENTANYL CITRATE 50 UG/ML
25-50 INJECTION, SOLUTION INTRAMUSCULAR; INTRAVENOUS
Status: DISCONTINUED | OUTPATIENT
Start: 2019-08-07 | End: 2019-08-07 | Stop reason: HOSPADM

## 2019-08-07 RX ORDER — DEXAMETHASONE SODIUM PHOSPHATE 4 MG/ML
INJECTION, SOLUTION INTRA-ARTICULAR; INTRALESIONAL; INTRAMUSCULAR; INTRAVENOUS; SOFT TISSUE PRN
Status: DISCONTINUED | OUTPATIENT
Start: 2019-08-07 | End: 2019-08-07

## 2019-08-07 RX ORDER — ERTAPENEM 1 G/1
1 INJECTION, POWDER, LYOPHILIZED, FOR SOLUTION INTRAMUSCULAR; INTRAVENOUS EVERY 24 HOURS
Status: DISCONTINUED | OUTPATIENT
Start: 2019-08-08 | End: 2019-08-07

## 2019-08-07 RX ORDER — SODIUM CHLORIDE, SODIUM LACTATE, POTASSIUM CHLORIDE, CALCIUM CHLORIDE 600; 310; 30; 20 MG/100ML; MG/100ML; MG/100ML; MG/100ML
INJECTION, SOLUTION INTRAVENOUS CONTINUOUS PRN
Status: DISCONTINUED | OUTPATIENT
Start: 2019-08-07 | End: 2019-08-07

## 2019-08-07 RX ORDER — ALBUTEROL SULFATE 0.83 MG/ML
2.5 SOLUTION RESPIRATORY (INHALATION) EVERY 4 HOURS PRN
Status: DISCONTINUED | OUTPATIENT
Start: 2019-08-07 | End: 2019-08-07 | Stop reason: HOSPADM

## 2019-08-07 RX ORDER — ONDANSETRON 2 MG/ML
4 INJECTION INTRAMUSCULAR; INTRAVENOUS EVERY 30 MIN PRN
Status: DISCONTINUED | OUTPATIENT
Start: 2019-08-07 | End: 2019-08-07 | Stop reason: HOSPADM

## 2019-08-07 RX ORDER — ALBUTEROL SULFATE 0.83 MG/ML
2.5 SOLUTION RESPIRATORY (INHALATION) EVERY 4 HOURS PRN
Status: DISCONTINUED | OUTPATIENT
Start: 2019-08-07 | End: 2019-08-14 | Stop reason: HOSPADM

## 2019-08-07 RX ORDER — MAGNESIUM HYDROXIDE 1200 MG/15ML
LIQUID ORAL PRN
Status: DISCONTINUED | OUTPATIENT
Start: 2019-08-07 | End: 2019-08-07 | Stop reason: HOSPADM

## 2019-08-07 RX ORDER — PROPOFOL 10 MG/ML
INJECTION, EMULSION INTRAVENOUS PRN
Status: DISCONTINUED | OUTPATIENT
Start: 2019-08-07 | End: 2019-08-07

## 2019-08-07 RX ORDER — NALOXONE HYDROCHLORIDE 0.4 MG/ML
.1-.4 INJECTION, SOLUTION INTRAMUSCULAR; INTRAVENOUS; SUBCUTANEOUS
Status: DISCONTINUED | OUTPATIENT
Start: 2019-08-07 | End: 2019-08-07

## 2019-08-07 RX ORDER — NEOSTIGMINE METHYLSULFATE 1 MG/ML
VIAL (ML) INJECTION PRN
Status: DISCONTINUED | OUTPATIENT
Start: 2019-08-07 | End: 2019-08-07

## 2019-08-07 RX ORDER — HYDROMORPHONE HYDROCHLORIDE 1 MG/ML
.3-.5 INJECTION, SOLUTION INTRAMUSCULAR; INTRAVENOUS; SUBCUTANEOUS EVERY 5 MIN PRN
Status: DISCONTINUED | OUTPATIENT
Start: 2019-08-07 | End: 2019-08-07 | Stop reason: HOSPADM

## 2019-08-07 RX ORDER — ONDANSETRON 2 MG/ML
INJECTION INTRAMUSCULAR; INTRAVENOUS PRN
Status: DISCONTINUED | OUTPATIENT
Start: 2019-08-07 | End: 2019-08-07

## 2019-08-07 RX ORDER — FENTANYL CITRATE 50 UG/ML
INJECTION, SOLUTION INTRAMUSCULAR; INTRAVENOUS PRN
Status: DISCONTINUED | OUTPATIENT
Start: 2019-08-07 | End: 2019-08-07

## 2019-08-07 RX ORDER — GLYCOPYRROLATE 0.2 MG/ML
INJECTION, SOLUTION INTRAMUSCULAR; INTRAVENOUS PRN
Status: DISCONTINUED | OUTPATIENT
Start: 2019-08-07 | End: 2019-08-07

## 2019-08-07 RX ORDER — LIDOCAINE HYDROCHLORIDE 20 MG/ML
INJECTION, SOLUTION INFILTRATION; PERINEURAL PRN
Status: DISCONTINUED | OUTPATIENT
Start: 2019-08-07 | End: 2019-08-07

## 2019-08-07 RX ORDER — ERTAPENEM 1 G/1
1 INJECTION, POWDER, LYOPHILIZED, FOR SOLUTION INTRAMUSCULAR; INTRAVENOUS EVERY 24 HOURS
Status: DISCONTINUED | OUTPATIENT
Start: 2019-08-08 | End: 2019-08-12

## 2019-08-07 RX ADMIN — NEOSTIGMINE METHYLSULFATE 3 MG: 1 INJECTION, SOLUTION INTRAVENOUS at 11:16

## 2019-08-07 RX ADMIN — UMECLIDINIUM 1 PUFF: 62.5 AEROSOL, POWDER ORAL at 18:04

## 2019-08-07 RX ADMIN — ONDANSETRON 4 MG: 2 INJECTION INTRAMUSCULAR; INTRAVENOUS at 10:30

## 2019-08-07 RX ADMIN — DEXMEDETOMIDINE HYDROCHLORIDE 8 MCG: 100 INJECTION, SOLUTION INTRAVENOUS at 10:17

## 2019-08-07 RX ADMIN — ROCURONIUM BROMIDE 20 MG: 10 INJECTION INTRAVENOUS at 10:30

## 2019-08-07 RX ADMIN — SODIUM CHLORIDE, POTASSIUM CHLORIDE, SODIUM LACTATE AND CALCIUM CHLORIDE: 600; 310; 30; 20 INJECTION, SOLUTION INTRAVENOUS at 09:52

## 2019-08-07 RX ADMIN — FENTANYL CITRATE 25 MCG: 50 INJECTION, SOLUTION INTRAMUSCULAR; INTRAVENOUS at 11:18

## 2019-08-07 RX ADMIN — PHENYLEPHRINE HYDROCHLORIDE 100 MCG: 10 INJECTION INTRAVENOUS at 10:51

## 2019-08-07 RX ADMIN — HYDROMORPHONE HYDROCHLORIDE 0.3 MG: 1 INJECTION, SOLUTION INTRAMUSCULAR; INTRAVENOUS; SUBCUTANEOUS at 21:45

## 2019-08-07 RX ADMIN — ARFORMOTEROL TARTRATE 15 MCG: 15 SOLUTION RESPIRATORY (INHALATION) at 08:59

## 2019-08-07 RX ADMIN — PROPOFOL 60 MG: 10 INJECTION, EMULSION INTRAVENOUS at 10:17

## 2019-08-07 RX ADMIN — ATENOLOL 50 MG: 50 TABLET ORAL at 08:42

## 2019-08-07 RX ADMIN — HYDROMORPHONE HYDROCHLORIDE 0.2 MG: 1 INJECTION, SOLUTION INTRAMUSCULAR; INTRAVENOUS; SUBCUTANEOUS at 12:59

## 2019-08-07 RX ADMIN — LIDOCAINE HYDROCHLORIDE 60 MG: 20 INJECTION, SOLUTION INFILTRATION; PERINEURAL at 10:17

## 2019-08-07 RX ADMIN — DEXAMETHASONE SODIUM PHOSPHATE 4 MG: 4 INJECTION, SOLUTION INTRA-ARTICULAR; INTRALESIONAL; INTRAMUSCULAR; INTRAVENOUS; SOFT TISSUE at 10:30

## 2019-08-07 RX ADMIN — PHENYLEPHRINE HYDROCHLORIDE 100 MCG: 10 INJECTION INTRAVENOUS at 10:25

## 2019-08-07 RX ADMIN — BUDESONIDE 0.5 MG: 0.5 INHALANT RESPIRATORY (INHALATION) at 08:58

## 2019-08-07 RX ADMIN — HYDROMORPHONE HYDROCHLORIDE 0.3 MG: 1 INJECTION, SOLUTION INTRAMUSCULAR; INTRAVENOUS; SUBCUTANEOUS at 18:04

## 2019-08-07 RX ADMIN — ERTAPENEM SODIUM 1 G: 1 INJECTION, POWDER, LYOPHILIZED, FOR SOLUTION INTRAMUSCULAR; INTRAVENOUS at 10:25

## 2019-08-07 RX ADMIN — SODIUM CHLORIDE, POTASSIUM CHLORIDE, SODIUM LACTATE AND CALCIUM CHLORIDE: 600; 310; 30; 20 INJECTION, SOLUTION INTRAVENOUS at 10:30

## 2019-08-07 RX ADMIN — BUDESONIDE 0.5 MG: 0.5 INHALANT RESPIRATORY (INHALATION) at 20:05

## 2019-08-07 RX ADMIN — PHENYLEPHRINE HYDROCHLORIDE 100 MCG: 10 INJECTION INTRAVENOUS at 10:38

## 2019-08-07 RX ADMIN — LIDOCAINE HYDROCHLORIDE 0.2 ML: 10 INJECTION, SOLUTION EPIDURAL; INFILTRATION; INTRACAUDAL; PERINEURAL at 09:52

## 2019-08-07 RX ADMIN — FENTANYL CITRATE 75 MCG: 50 INJECTION, SOLUTION INTRAMUSCULAR; INTRAVENOUS at 10:17

## 2019-08-07 RX ADMIN — GLYCOPYRROLATE 0.4 MG: 0.2 INJECTION, SOLUTION INTRAMUSCULAR; INTRAVENOUS at 11:16

## 2019-08-07 RX ADMIN — DEXMEDETOMIDINE HYDROCHLORIDE 12 MCG: 100 INJECTION, SOLUTION INTRAVENOUS at 10:07

## 2019-08-07 RX ADMIN — PHENYLEPHRINE HYDROCHLORIDE 100 MCG: 10 INJECTION INTRAVENOUS at 10:44

## 2019-08-07 RX ADMIN — ROCURONIUM BROMIDE 10 MG: 10 INJECTION INTRAVENOUS at 10:17

## 2019-08-07 RX ADMIN — SODIUM CHLORIDE: 9 INJECTION, SOLUTION INTRAVENOUS at 18:05

## 2019-08-07 RX ADMIN — PHENYLEPHRINE HYDROCHLORIDE 0.5 MCG/KG/MIN: 10 INJECTION INTRAVENOUS at 10:53

## 2019-08-07 RX ADMIN — METRONIDAZOLE 500 MG: 500 INJECTION, SOLUTION INTRAVENOUS at 01:09

## 2019-08-07 RX ADMIN — AMLODIPINE BESYLATE 10 MG: 10 TABLET ORAL at 08:42

## 2019-08-07 RX ADMIN — ARFORMOTEROL TARTRATE 15 MCG: 15 SOLUTION RESPIRATORY (INHALATION) at 20:05

## 2019-08-07 RX ADMIN — SUCCINYLCHOLINE CHLORIDE 60 MG: 20 INJECTION, SOLUTION INTRAMUSCULAR; INTRAVENOUS; PARENTERAL at 10:17

## 2019-08-07 RX ADMIN — PHENYLEPHRINE HYDROCHLORIDE 100 MCG: 10 INJECTION INTRAVENOUS at 10:20

## 2019-08-07 RX ADMIN — METRONIDAZOLE 500 MG: 500 INJECTION, SOLUTION INTRAVENOUS at 06:50

## 2019-08-07 ASSESSMENT — ACTIVITIES OF DAILY LIVING (ADL)
ADLS_ACUITY_SCORE: 18

## 2019-08-07 ASSESSMENT — LIFESTYLE VARIABLES: TOBACCO_USE: 1

## 2019-08-07 ASSESSMENT — ENCOUNTER SYMPTOMS
DYSRHYTHMIAS: 1
SEIZURES: 0

## 2019-08-07 ASSESSMENT — MIFFLIN-ST. JEOR: SCORE: 828.38

## 2019-08-07 ASSESSMENT — COPD QUESTIONNAIRES
CAT_SEVERITY: SEVERE
COPD: 1

## 2019-08-07 NOTE — ANESTHESIA POSTPROCEDURE EVALUATION
Patient: Melissa Alfaro    Procedure(s):  EXPLORATORY LAPAROTOMY, LYSIS OF ADHESION, SMALL BOWEL RESECTION    Diagnosis:UNKNOWN  Diagnosis Additional Information: No value filed.    Anesthesia Type:  General, RSI, ETT    Note:  Anesthesia Post Evaluation    Patient location during evaluation: PACU  Patient participation: Able to fully participate in evaluation  Level of consciousness: awake and alert  Pain management: adequate  Airway patency: patent  Cardiovascular status: acceptable  Respiratory status: acceptable  Hydration status: acceptable  PONV: none     Anesthetic complications: None          Last vitals:  Vitals:    08/07/19 1130 08/07/19 1140 08/07/19 1150   BP: 127/72 (!) 148/62 (!) 146/61   Pulse:  87 87   Resp: 23 23 28   Temp:      SpO2: 100% 100% 100%         Electronically Signed By: Catarina Gibson MD  August 7, 2019  12:06 PM

## 2019-08-07 NOTE — BRIEF OP NOTE
[facility]    Brief Operative Note    Pre-operative diagnosis: SBO  Post-operative diagnosis SBO  Procedure: Procedure(s):  EXPLORATORY LAPAROTOMY, LYSIS OF ADHESION, SMALL BOWEL RESECTION  Surgeon: Surgeon(s) and Role:     * Avani Valente MD - Primary     * Jocelyn Daniel PA-C - Assisting  Anesthesia: General   Estimated blood loss: 10 mL  Drains: None  Specimens:   ID Type Source Tests Collected by Time Destination   A : SMALL BOWEL Tissue Other SURGICAL PATHOLOGY EXAM Avani Valente MD 8/7/2019 10:51 AM      Findings:   adhesive band around small bowel.  Section of non viable small bowel resected.  600 mL of bloody ascities .  Complications: None.  Implants:  * No implants in log *

## 2019-08-07 NOTE — ADDENDUM NOTE
Addendum  created 08/07/19 1255 by Catarina Gibson MD    Order list changed, Order sets accessed

## 2019-08-07 NOTE — PROGRESS NOTES
"General Surgery Progress Note    Subjective: pt reports she is fine. Does acknowledge that abdomen is more tender today than yesterday. No nausea. Large output from NGT.     Objective: /62 (BP Location: Left arm)   Pulse 94   Temp 98.1  F (36.7  C) (Oral)   Resp 16   Ht 1.549 m (5' 1\")   Wt 44.6 kg (98 lb 5.2 oz)   SpO2 99%   BMI 18.58 kg/m    Gen: sitting up in chair, appears comfortable  CV: RRR  Pulm: nonlabored breathing  Abd: abdomen is more distended with increased pain to palpation throughout with rebound present  Ext: WWP    Assessment/Plan:   Melissa Alfaro  is a 85 year old female with SBO, received contrast via NGT yesterday and had repeat CT this morning which revealed persistent SBO with contrast only in small bowel, none in colon. Her exam is worse today and I recommend emergent laparotomy with lysis of adhesions, possible bowel resection. I have tried to call both daughters to discuss.   - To OR this morning.     Avani Valente MD  [facility]  [phone]            "

## 2019-08-07 NOTE — OR NURSING
1210 assumed care from drea Valentine5 OK with dr jensen for pt to go upstairs  1300 continue to wait for a bed upstairs

## 2019-08-07 NOTE — ANESTHESIA CARE TRANSFER NOTE
Patient: Melissa Alfaro    Procedure(s):  EXPLORATORY LAPAROTOMY, LYSIS OF ADHESION, SMALL BOWEL RESECTION    Diagnosis: UNKNOWN  Diagnosis Additional Information: No value filed.    Anesthesia Type:   General, RSI, ETT     Note:  Airway :Face Mask  Patient transferred to:PACU  Comments: Neuromuscular blockade reversed after TOF 4/4, spontaneous respirations, adequate tidal volumes, followed commands to voice, oropharynx suctioned with soft flexible catheter, extubated atraumatically, extubated with suction, airway patent after extubation.  Oxygen via facemask at 8 liters per minute to PACU. Oxygen tubing connected to wall O2 in PACU, SpO2, NiBP, and EKG monitors and alarms on and functioning, Ryan Hugger warmer connected to patient gown, report on patient's clinical status given to PACU RN, RN questions answered. Handoff Report: Identifed the Patient, Identified the Reponsible Provider, Reviewed the pertinent medical history, Discussed the surgical course, Reviewed Intra-OP anesthesia mangement and issues during anesthesia, Set expectations for post-procedure period and Allowed opportunity for questions and acknowledgement of understanding      Vitals: (Last set prior to Anesthesia Care Transfer)    CRNA VITALS  8/7/2019 1055 - 8/7/2019 1134      8/7/2019             Pulse:  86    SpO2:  100 %    Resp Rate (set):  10                Electronically Signed By: TONY Valdez CRNA  August 7, 2019  11:34 AM

## 2019-08-07 NOTE — OP NOTE
General Surgery Operative Note    PREOPERATIVE DIAGNOSIS: bowel obstruction    POSTOPERATIVE DIAGNOSIS: small bowel ischemia due to obstructing adhesive band    PROCEDURE: exploratory laparotomy, lysis of adhesions, small bowel resection    SURGEON:  Avani Valente MD    ASSISTANT:  Jocelyn Daniel PA-C  The PA s assistance was medically necessary to provide adequate exposure in the operating field, maintain hemostasis, cutting suture, clamping and ligating bleeding vessels, and visualization of anatomic structures throughout the surgical procedure.       ANESTHESIA:  General.    BLOOD LOSS: 10ml    FINDINGS: 30cm of frankly ischemic mid small bowel     INDICATIONS:   Melissa is an 85yof who presented with abdominal pain and distention she had initial improvement of abdominal pain and we tried a gastrograffin challenge on 8/6/2019. Her pain worsened and follow up abdominal CT revealed contrast only in small bowel with persistent bowel obstruction. I recommended emergent laparotomy given peritonitis on exam.     DETAILS OF PROCEDURE: The patient was brought to the operating room per Anesthesia, placed in supine position, and intubated without difficulty. A wiley catheter was placed in sterile fashion. Perioperative abx were administered. The abdomen was prepped and draped in standard fashion. A Surgical timeout was then performed, verifying the correct surgeon, site, procedure, and patient and all in the room were in agreement.  We began by making a midline incision from just above the umbilicus to just below. We used cautery to divide the subcutaneous tissue. The fascia was incised in the midline with cautery. The peritoneum was grasped and entered sharply. There was immediate return for serosanguinous ascites. 600ml of serosanguinous ascites was evacuated. We then began evaluating the small bowel. The proximal small bowel was very distended. We encountered a segment of distal jejunum and ileum which was ischemic  and there was a dense band of scar tissue arising from the omentum and traveling to the right lower quadrant which was obstructing a 30cm segment of small bowel. The 30cm segment was frankly ischemic but not perforated. We used the ligasure to divide the mesentery of the ischemic section of small bowel. We then identified normal bowel proximal to the area of ischemia and distal. The distal normal bowel was 20cm from terminal ileum. Enterotomies were created with cautery on the antimesenteric aspect of the bowel and a limb of a blue load stapler passed down each and aligned along the antimesenteric aspect and fired to create the common enterotomy. A second load of a blue MANDY stapler was used to close the defect and excise the ischemic bowel. 3-0 vicryl sutures were placed at the crotch of the staple line to decrease tension and for hemostasis on the suture line. The mesenteric defect was closed with a running 3-0 vicryl suture. The abdomen was irrigated with several liters of warm sterile saline until clear. We then used a new closing tray with new gown and gloves to close the fascia. The fascia was closed with two looped 0 running PDS sutures and tying in the middle. The skin was closed with staples. The patient tolerated the procedure well. All instrument, needle and sponge counts were correct at the conclusion of the procedure.       Avani Valente MD

## 2019-08-07 NOTE — PLAN OF CARE
DATE & TIME: 08/07/19 [id number]  Cognitive Concerns/ Orientation : Pt A/Ox2, disoriented to place and situation. Very forgetful.   BEHAVIOR & AGGRESSION TOOL COLOR: Green   ABNL VS/O2: VSS on 2L NC  MOBILITY: Assist x1 with gait belt and walker, fall risk  PAIN MANAGMENT: Denies, general all over discomfort. Tenderness in abdomen.    DIET: NPO with ice chips and medications  BOWEL/BLADDER: Continent. Occasional retention.  Small, soft maroon BM this am @ 0900.   DRAIN/DEVICES: IVF infusing. NG tube to LIS, green output  SKIN: Intact  TESTS/PROCEDURES: Abdominal CT this morning, showed obstruction.  Down for surgery at 0930 on 08/07.   D/C DAY/GOALS/PLACE: Discharge pending resolution of SBO  OTHER IMPORTANT INFO: Bowel sounds hypoactive, abdomen tender. Not passing flatus this morning. Continuing on IV antibiotics. Pt has R > L pupil, this is baseline for patient.

## 2019-08-07 NOTE — ANESTHESIA PREPROCEDURE EVALUATION
Anesthesia Pre-Procedure Evaluation    Patient: Melissa Alfaro   MRN: [id number] : 12/3/1933          Preoperative Diagnosis: UNKNOWN    Procedure(s):  EXPLORATORY LAPAROTOMY, LYSIS OF ADHESION, POSSIBLE BOWEL RESECTION    Past Medical History:   Diagnosis Date     Anxiety      Benign neoplasm of colon      Carotid stenosis, asymptomatic      COPD (chronic obstructive pulmonary disease) (H)      Corns and callosities      Dermatophytosis of nail      Essential hypertension      History of atrial fibrillation      Hyperlipidemia      Hyperlipidemia      Insomnia      Irritable bowel syndrome      Nocturia      Plantar fascial fibromatosis      Primary localized osteoarthrosis of the hip      Pulmonary hypertension (H)      Sprain of lumbar region      Vitamin D deficiency      VSD (ventricular septal defect and aortic arch hypoplasia      Past Surgical History:   Procedure Laterality Date     APPENDECTOMY       AS TOTAL HIP ARTHROPLASTY       Allergies   Allergen Reactions     No Clinical Screening - See Comments Shortness Of Breath     With some mold spores  Other reaction(s): Other  Heart races with antihistamines     Diphtheria-Tetanus Toxoids      Other reaction(s): Other  Red swollen arm     Epinephrine      Other reaction(s): Other  Heart rate increases & her body shakes & trembles     Penicillin G      Other reaction(s): Other (Specify in Comments)     Prior to Admission medications    Medication Sig Start Date End Date Taking? Authorizing Provider   ACETAMINOPHEN PO Take 325-650 mg by mouth every 6 hours as needed for pain   Yes Reported, Patient   albuterol (PROAIR HFA/PROVENTIL HFA/VENTOLIN HFA) 108 (90 Base) MCG/ACT Inhaler Inhale 1-2 puffs into the lungs every 4 hours as needed for shortness of breath / dyspnea or wheezing   Yes Reported, Patient   amLODIPine (NORVASC) 10 MG tablet Take 10 mg by mouth daily   Yes Unknown, Entered By History   arformoterol (BROVANA) 15 MCG/2ML NEBU neb solution Take 15  mcg by nebulization 2 times daily   Yes Unknown, Entered By History   atenolol (TENORMIN) 50 MG tablet Take 50 mg by mouth daily   Yes Unknown, Entered By History   budesonide (PULMICORT) 0.5 MG/2ML neb solution Take 0.5 mg by nebulization 2 times daily   Yes Unknown, Entered By History   glycerin-hypromellose- (ARTIFICIAL TEARS) 0.2-0.2-1 % SOLN ophthalmic solution Place 1 drop into both eyes 4 times daily as needed    Yes Reported, Patient   tiotropium (SPIRIVA) 18 MCG capsule Inhale 18 mcg into the lungs daily    Yes Reported, Patient   vitamin D3 (CHOLECALCIFEROL) 09350 units capsule Take 50,000 Units by mouth once a week On Monday.   Yes Unknown, Entered By History     ECG: Sinus rhythm  Left axis deviation  Septal infarct , age undetermined  Abnormal ECG  No previous ECGs available  ECHO: FINAL IMPRESSION:   1.  Probable severe diastolic dysfunction of the left ventricle.   2.  Normal to mildly hyperdynamic function of the left ventricle. Ejection fraction 65-70% range.   3.  Moderate to severe left atrial enlargement.    4.  Mild mitral valve regurgitation.    5.  Right ventricular size, contractility is normal.        Anesthesia Evaluation     .             ROS/MED HX    ENT/Pulmonary:     (+)tobacco use, Past use severe COPD, , . .   (-) sleep apnea   Neurologic:      (-) seizures, CVA and migraines   Cardiovascular:     (+) Dyslipidemia, hypertension-Peripheral Vascular Disease-- Carotid Stenosis, --. : . . . :. dysrhythmias a-fib, .      (-) CAD, CHF and CABG   METS/Exercise Tolerance:  1 - Eating, dressing   Hematologic:         Musculoskeletal:         GI/Hepatic:     (+) GERD      (-) liver disease   Renal/Genitourinary:     (+) chronic renal disease, type: ARF,    (-) nephrolithiasis   Endo:      (-) Type I DM, Type II DM, thyroid disease and obesity   Psychiatric:        (-) psychiatric history   Infectious Disease:        (-) Recent Fever   Malignancy:         Other:                       "    Physical Exam  Normal systems: cardiovascular, pulmonary and dental    Airway   Mallampati: II  TM distance: >3 FB  Neck ROM: full    Dental     Cardiovascular   Rhythm and rate: regular and normal      Pulmonary    breath sounds clear to auscultation            Lab Results   Component Value Date    WBC 20.4 (H) 08/07/2019    HGB 10.0 (L) 08/07/2019    HCT 31.1 (L) 08/07/2019     08/07/2019     08/06/2019    POTASSIUM 4.0 08/06/2019    CHLORIDE 112 (H) 08/06/2019    CO2 22 08/06/2019    BUN 40 (H) 08/06/2019    CR 1.24 (H) 08/06/2019     (H) 08/06/2019    LUIS ENRIQUE 8.4 (L) 08/06/2019    ALBUMIN 3.8 08/04/2019    PROTTOTAL 7.6 08/04/2019    ALT 21 08/04/2019    AST 22 08/04/2019    ALKPHOS 58 08/04/2019    BILITOTAL 0.6 08/04/2019    LIPASE 131 08/04/2019       Preop Vitals  BP Readings from Last 3 Encounters:   08/07/19 134/62   05/18/18 125/60   05/08/18 150/59    Pulse Readings from Last 3 Encounters:   08/07/19 94   05/18/18 77   05/08/18 87      Resp Readings from Last 3 Encounters:   08/07/19 16   05/18/18 18   05/08/18 18    SpO2 Readings from Last 3 Encounters:   08/07/19 99%   05/18/18 94%   05/08/18 93%      Temp Readings from Last 1 Encounters:   08/07/19 36.7  C (98.1  F) (Oral)    Ht Readings from Last 1 Encounters:   08/04/19 1.549 m (5' 1\")      Wt Readings from Last 1 Encounters:   08/07/19 44.6 kg (98 lb 5.2 oz)    Estimated body mass index is 18.58 kg/m  as calculated from the following:    Height as of this encounter: 1.549 m (5' 1\").    Weight as of this encounter: 44.6 kg (98 lb 5.2 oz).       Anesthesia Plan      History & Physical Review      ASA Status:  3 emergent.    NPO Status:  Full stomach    Plan for General, RSI and ETT with Propofol induction. Maintenance will be Balanced.    PONV prophylaxis:  Ondansetron (or other 5HT-3) and Dexamethasone or Solumedrol  Additional equipment: 2nd IV      Postoperative Care  Postoperative pain management:  Multi-modal analgesia.  "     Consents  Anesthetic plan, risks, benefits and alternatives discussed with:  Patient..                 Catarina Gibson MD

## 2019-08-07 NOTE — PLAN OF CARE
Arrived from PACU at 1415. A&O x4. VSS on 1 LPM NC. CMS intact. Lungs diminished and coarse. BS hypoactive, BM-, flatus-. NG to LIS, green/bile output. Abdominal incision x1, dressing CDI, binder applied. Tolerating ice chips, NPO w/ice. Denies N/V. Doran patent, adequate output. Dilaudid for pain. Up w/1.

## 2019-08-07 NOTE — PROGRESS NOTES
[facility]    Hospitalist Progress Note    Assessment & Plan   Melissa Alfaro is a 85 year old female with PMHx of hypertension, hyperlipidemia, paroxysmal afib (not on chronic anticoagulation), COPD, pulmonary hypertension, COPD, carotid stenosis, IBS, insomonia and anxiety among other medical problems who was admitted on 8/4/2019 with abdominal pain secondary to SBO vs ileus.    Patient recently moved from [place], [place] to the [facility] to live with her daughter. Most of her previous care was through [facility].    SBO, s/p exp lap with lysis of adhesions and resection of small bowel on 8/7/19   Presented with complaints of abdominal pain that began the morning of admission. Some nausea but no vomiting. No diarrhea. In ED was afebrile and hemodynamically stable. WBC 28. LFTs, lactate and lipase all nl. CT abd/pelvis obtained and showed multiple dilated loops of small bowel in the lower abd with no discrete transition point -- suggestive of partial SBP vs ileus and mild to mod intraabdominal and pelvis ascites with mild wall thickening of the ascending colon and several loops of small bowel (suggestive of enteritis vs colitis). NGT placed. Initiated on bowel rest and supportive cares with IVFs, antiemetics, pain meds and antibiotics.     General surgery followed closely. Had ongoing large output per NG, abd pain and persistent leukocytosis. Serial lactates nl. Had repeat CT on 8/7 which showed persistent SBO with contrast (given on 8/6) in small bowel only. Taken for emergent exp lap per Dr. Valente. Underwent exploratory laparotomy with lysis of adhesions and partial small bowel resection. Intraop findings were notable for an adhesive band around the small bowel prompting resection of a section of nonviable small bowel, also noted to have 600ml bloody ascites.    -- routine postop cares per general surgery including pain control and DVT ppx  -- abx changed from cipro/flagyl to ertapenem  postop  -- remains NPO  -- cont other supportive cares as below    SANDRA: Improved  Baseline Cr seems to be around 0.9 - 1.1. No reported hx of CKD. Cr 1.28 on presentation.   Cr initially trended up despite IVFs, peaked at 1.68 on 8/5. At that time, urine output was noted to be low per RN so was given an additional 500ml fluid bolus  -- Cr now trending down: 1.68 -- 1.24 -- 1.13 today  -- cont maintenance fluids as below but watch for sx of volume overload dt hx of severe diastolic dysfunction and bilateral pleural effusions on CT    Possible Pyelonephritis  Noted on CT to have horseshoe shaped kidneys and some perinephric stranding suggestive of pyelonephritis. UA on admission showed lg leuk est with 21 WBCs, 3 RBCs and few squam cells. Per RN, urine output initially low -- as she had worsening renal function on 8/5, she was given an additional 500ml fluid bolus this morning and cont IVFs with NS @100ml/h (will need to monitor respiratory status given hx of severe diastolic dysfunction)  -- place on IV Cipro on admission dt PCN allergy (causes hives/edema), changed to ertapenem postop  -- urine culture drawn on admission grew 50-100k strep agalactiae (sensitive to PCNs, Vanco and cephalosporins) -- unclear how much these findings have been contributing to current condition given above  -- blood culture drawn on admission remains negative    Hypertension  Hyperlipidemia  Paroxysmal Afib  Chronic diastolic dysfunction (reported as severe on last echo in 4/2018)  Last echo in 4/2018 showed probable severe diastolic dysfunction of the left ventricle, EF 65-70%, mod to severe LAE and mild MR; RVSF nl.  PTA meds: atenolol 50mg daily, amlodipine 10mg daily. Not on chronic anticoagulation, aspirin or statin  -- atenolol and amlodipine continued on admission (BPs stable)  -- requiring IVFs this stay dt above -- need to monitor closely for potential volume overload   -- endorsed mildly increased dyspnea on 8/6 AM and O2 needs  increased from 1L NC to 2L NC with had faint crackles over L base so rate of NS was decreased to 50ml/h, CT abd/pelvis on 8/7 showed new bilateral pleural effusions (R > L) and O2 needs postop have been 3-4L NC  -- monitor clinically on low rate of NS, if O2 needs increase will need to consider diuresis    COPD  Chronic and stable on home inhalers, including aformoterol, budesonide, tiotropium and prn albuterol. No s/sx of exacerbation  -- monitor respiratory status    FEN: cont NS at 50ml/h, lytes stable, diet per surgery -- NPO for now  DVT Prophylaxis: PCDs, subQ heparin  Code Status: Full Code     Disposition: Discharge date unclear, pending postop course and stable respiratory status -- suspect 3+ days still. Will eventually need reassessment per PT/OT. Anticipate TCU stay will be recommended.    Family at bedside and in agreement with care plan.    Lisa Vieyra    Interval History   This mornings events noted. Taken to OR for emergent exp lap with partial small bowel resection. Seen postoperatively. Presently resting comfortably in bed. Asking for ice chips. Pain presently controlled. Weaning O2. Denies sob/cough.     -Data reviewed today: I reviewed all new labs and imaging results over the last 24 hours. I personally reviewed no images or EKG's today.    Physical Exam   Temp: 98.4  F (36.9  C) Temp src: Oral BP: 130/52 Pulse: 83 Heart Rate: 76 Resp: 26 SpO2: 96 % O2 Device: Nasal cannula Oxygen Delivery: 3 LPM  Vitals:    08/05/19 0500 08/06/19 0701 08/07/19 0615   Weight: 45.7 kg (100 lb 12 oz) 45.8 kg (100 lb 15.5 oz) 44.6 kg (98 lb 5.2 oz)     Vital Signs with Ranges  Temp:  [96.3  F (35.7  C)-98.4  F (36.9  C)] 98.4  F (36.9  C)  Pulse:  [74-94] 83  Heart Rate:  [74-94] 76  Resp:  [11-30] 26  BP: (121-159)/(49-72) 130/52  SpO2:  [88 %-100 %] 96 %  I/O last 3 completed shifts:  In: 3637 [I.V.:3587; NG/GT:50]  Out: 3160 [Urine:1025; Emesis/NG output:2125; Blood:10]    Constitutional: Resting  quietly in bed, alert, answering questions appropriately, NAD  Respiratory: BS diminished at bilateral lung bases with faint bibasilar crackles, no wheeze, no increased work of breathing  Cardiovascular: HRRR, no MGR, no LE edema  GI: appropriately TTP  Skin/Integumen: warm/dry  Other: +NG tube in place    Medications     sodium chloride 50 mL/hr at 08/06/19 1335       amLODIPine  10 mg Oral Daily     arformoterol  15 mcg Nebulization BID     atenolol  50 mg Oral Daily     budesonide  0.5 mg Nebulization BID     [START ON 8/8/2019] ertapenem (INVanz) IV  1 g Intravenous Q24H     [START ON 8/8/2019] heparin  5,000 Units Subcutaneous Q12H     sodium chloride (PF)  3 mL Intracatheter Q8H     umeclidinium  1 puff Inhalation Daily       Data   Recent Labs   Lab 08/07/19  1200 08/07/19  0850 08/06/19  0649 08/05/19  0925 08/04/19  1151   WBC  --  20.4* 24.8* 32.6* 28.0*   HGB  --  10.0* 9.7* 10.7* 12.3   MCV  --  90 90 90 89    349 264 305 377   NA  --  147* 141 140 137   POTASSIUM  --  3.7 4.0 4.5 4.4   CHLORIDE  --  115* 112* 107 101   CO2  --  24 22 23 25   BUN  --  35* 40* 44* 33*   CR  --  1.13* 1.24* 1.68* 1.28*   ANIONGAP  --  8 7 10 11   LUIS ENRIQUE  --  9.5 8.4* 8.3* 9.6   GLC  --  133* 127* 147* 199*   ALBUMIN  --   --   --   --  3.8   PROTTOTAL  --   --   --   --  7.6   BILITOTAL  --   --   --   --  0.6   ALKPHOS  --   --   --   --  58   ALT  --   --   --   --  21   AST  --   --   --   --  22   LIPASE  --   --   --   --  131       Recent Results (from the past 24 hour(s))   CT Abdomen Pelvis w/o Contrast    Narrative    PROCEDURE:  CT of the abdomen and pelvis without contrast    DATE OF PROCEDURE:  8/7/2019 8:02 AM    CLINICAL HISTORY/INDICATION:  evaluate interval changes, presented with pyelonephritis ? Ileus vs  SBO, high NGT output, received gastrografin 8/6 via NGT.    COMPARISON:  CT 8/4/2019    TECHNIQUE:  CT of the abdomen pelvis was performed without intravenous contrast.  Coronal reformats were  performed. Radiation dose for this scan was  reduced using automated exposure control, adjustment of the mA and/or  kV according to patient size, or iterative reconstruction technique.    DOSE:  DLP: 351 mGy-cm    FINDINGS:  Lower chest:   Interval development of small bilateral, right greater left pleural  effusions with basilar atelectasis. Hiatal hernia.    Abdomen/pelvis:  The enteric tube terminates at the pylorus/proximal duodenum. The  stomach is nondistended. Continued dilatation of numerous small bowel  loops, not significantly changed. Dilute oral contrast is present  throughout the distal small bowel. Hyperdense stool is present within  the nondilated colon, it is unclear if this is a remnant from the  prior exam has during CT 8/4/2019 there was hyperdense stool within  the colon. No hydronephrosis within the horseshoe kidney. Worsening  small volume abdominal ascites most significant in the deep pelvis and  perihepatic regions.      Impression    IMPRESSION:  1.  Continued partial small bowel obstruction versus ileus. Dilute  contrast is present within the fluid-filled distal small bowel.  Hyperdense stool is present within the decompressed colon.  2.  Worsening small volume ascites.  3.  New, bilateral right greater than left pleural effusions.    BONIFACIO MENDOZA MD

## 2019-08-07 NOTE — PLAN OF CARE
DATE & TIME: 8/6/19 2277-2991  Cognitive Concerns/ Orientation : Pt A/Ox2, disoriented to place and situation. Very forgetful.   BEHAVIOR & AGGRESSION TOOL COLOR: Green   ABNL VS/O2: VSS on 2L NC  MOBILITY: Assist x1 with gait belt and walker, fall risk  PAIN MANAGMENT: Denies  DIET: NPO with ice chips and medications  BOWEL/BLADDER: Continent  DRAIN/DEVICES: IVF infusing. NG tube to LIS, green output  SKIN: Intact  TESTS/PROCEDURES: Abdominal xray in AM  D/C DAY/GOALS/PLACE: Discharge pending resolution of SBO  OTHER IMPORTANT INFO: Bowel sounds active, abdomen tender. Not passing flatus this evening. Continuing on IV antibiotics. Ambulated in hallway with staff this evening.

## 2019-08-08 ENCOUNTER — APPOINTMENT (OUTPATIENT)
Dept: PHYSICAL THERAPY | Facility: CLINIC | Age: 84
DRG: 330 | End: 2019-08-08
Attending: INTERNAL MEDICINE
Payer: COMMERCIAL

## 2019-08-08 ENCOUNTER — APPOINTMENT (OUTPATIENT)
Dept: OCCUPATIONAL THERAPY | Facility: CLINIC | Age: 84
DRG: 330 | End: 2019-08-08
Attending: INTERNAL MEDICINE
Payer: COMMERCIAL

## 2019-08-08 LAB
ANION GAP SERPL CALCULATED.3IONS-SCNC: 8 MMOL/L (ref 3–14)
ANION GAP SERPL CALCULATED.3IONS-SCNC: 9 MMOL/L (ref 3–14)
BUN SERPL-MCNC: 27 MG/DL (ref 7–30)
BUN SERPL-MCNC: 29 MG/DL (ref 7–30)
CALCIUM SERPL-MCNC: 8.5 MG/DL (ref 8.5–10.1)
CALCIUM SERPL-MCNC: 8.9 MG/DL (ref 8.5–10.1)
CHLORIDE SERPL-SCNC: 116 MMOL/L (ref 94–109)
CHLORIDE SERPL-SCNC: 119 MMOL/L (ref 94–109)
CO2 SERPL-SCNC: 28 MMOL/L (ref 20–32)
CO2 SERPL-SCNC: 28 MMOL/L (ref 20–32)
COPATH REPORT: NORMAL
CREAT SERPL-MCNC: 0.92 MG/DL (ref 0.52–1.04)
CREAT SERPL-MCNC: 0.96 MG/DL (ref 0.52–1.04)
ERYTHROCYTE [DISTWIDTH] IN BLOOD BY AUTOMATED COUNT: 15.2 % (ref 10–15)
GFR SERPL CREATININE-BSD FRML MDRD: 54 ML/MIN/{1.73_M2}
GFR SERPL CREATININE-BSD FRML MDRD: 56 ML/MIN/{1.73_M2}
GLUCOSE SERPL-MCNC: 112 MG/DL (ref 70–99)
GLUCOSE SERPL-MCNC: 121 MG/DL (ref 70–99)
HCT VFR BLD AUTO: 28.5 % (ref 35–47)
HGB BLD-MCNC: 9.3 G/DL (ref 11.7–15.7)
LACTATE BLD-SCNC: 1.8 MMOL/L (ref 0.7–2)
MAGNESIUM SERPL-MCNC: 2.2 MG/DL (ref 1.6–2.3)
MCH RBC QN AUTO: 29.2 PG (ref 26.5–33)
MCHC RBC AUTO-ENTMCNC: 32.6 G/DL (ref 31.5–36.5)
MCV RBC AUTO: 90 FL (ref 78–100)
PLATELET # BLD AUTO: 280 10E9/L (ref 150–450)
POTASSIUM SERPL-SCNC: 3 MMOL/L (ref 3.4–5.3)
POTASSIUM SERPL-SCNC: 3.5 MMOL/L (ref 3.4–5.3)
RBC # BLD AUTO: 3.18 10E12/L (ref 3.8–5.2)
SODIUM SERPL-SCNC: 153 MMOL/L (ref 133–144)
SODIUM SERPL-SCNC: 155 MMOL/L (ref 133–144)
WBC # BLD AUTO: 13.5 10E9/L (ref 4–11)

## 2019-08-08 PROCEDURE — 93005 ELECTROCARDIOGRAM TRACING: CPT

## 2019-08-08 PROCEDURE — 25000128 H RX IP 250 OP 636: Performed by: PHYSICIAN ASSISTANT

## 2019-08-08 PROCEDURE — 85027 COMPLETE CBC AUTOMATED: CPT | Performed by: INTERNAL MEDICINE

## 2019-08-08 PROCEDURE — 83735 ASSAY OF MAGNESIUM: CPT | Performed by: INTERNAL MEDICINE

## 2019-08-08 PROCEDURE — 25000125 ZZHC RX 250: Performed by: STUDENT IN AN ORGANIZED HEALTH CARE EDUCATION/TRAINING PROGRAM

## 2019-08-08 PROCEDURE — 83605 ASSAY OF LACTIC ACID: CPT | Performed by: HOSPITALIST

## 2019-08-08 PROCEDURE — 97165 OT EVAL LOW COMPLEX 30 MIN: CPT | Mod: GO

## 2019-08-08 PROCEDURE — 25000132 ZZH RX MED GY IP 250 OP 250 PS 637: Performed by: SURGERY

## 2019-08-08 PROCEDURE — 40000275 ZZH STATISTIC RCP TIME EA 10 MIN

## 2019-08-08 PROCEDURE — 94640 AIRWAY INHALATION TREATMENT: CPT

## 2019-08-08 PROCEDURE — 94640 AIRWAY INHALATION TREATMENT: CPT | Mod: 76

## 2019-08-08 PROCEDURE — 25000125 ZZHC RX 250: Performed by: PHYSICIAN ASSISTANT

## 2019-08-08 PROCEDURE — 93010 ELECTROCARDIOGRAM REPORT: CPT | Mod: 76 | Performed by: INTERNAL MEDICINE

## 2019-08-08 PROCEDURE — 25800029 ZZH RX IP 258 OP 250: Performed by: HOSPITALIST

## 2019-08-08 PROCEDURE — 25800030 ZZH RX IP 258 OP 636: Performed by: STUDENT IN AN ORGANIZED HEALTH CARE EDUCATION/TRAINING PROGRAM

## 2019-08-08 PROCEDURE — 25000125 ZZHC RX 250

## 2019-08-08 PROCEDURE — 80048 BASIC METABOLIC PNL TOTAL CA: CPT | Performed by: HOSPITALIST

## 2019-08-08 PROCEDURE — 97116 GAIT TRAINING THERAPY: CPT | Mod: GP | Performed by: PHYSICAL THERAPIST

## 2019-08-08 PROCEDURE — 80048 BASIC METABOLIC PNL TOTAL CA: CPT | Performed by: INTERNAL MEDICINE

## 2019-08-08 PROCEDURE — 99232 SBSQ HOSP IP/OBS MODERATE 35: CPT | Performed by: HOSPITALIST

## 2019-08-08 PROCEDURE — 97161 PT EVAL LOW COMPLEX 20 MIN: CPT | Mod: GP | Performed by: PHYSICAL THERAPIST

## 2019-08-08 PROCEDURE — 36415 COLL VENOUS BLD VENIPUNCTURE: CPT | Performed by: INTERNAL MEDICINE

## 2019-08-08 PROCEDURE — 97535 SELF CARE MNGMENT TRAINING: CPT | Mod: GO

## 2019-08-08 PROCEDURE — 25000125 ZZHC RX 250: Performed by: HOSPITALIST

## 2019-08-08 PROCEDURE — 36415 COLL VENOUS BLD VENIPUNCTURE: CPT | Performed by: HOSPITALIST

## 2019-08-08 PROCEDURE — 12000000 ZZH R&B MED SURG/OB

## 2019-08-08 RX ORDER — DEXTROSE MONOHYDRATE 50 MG/ML
INJECTION, SOLUTION INTRAVENOUS CONTINUOUS
Status: DISCONTINUED | OUTPATIENT
Start: 2019-08-08 | End: 2019-08-11

## 2019-08-08 RX ORDER — METOPROLOL TARTRATE 1 MG/ML
INJECTION, SOLUTION INTRAVENOUS
Status: COMPLETED
Start: 2019-08-08 | End: 2019-08-08

## 2019-08-08 RX ORDER — NITROGLYCERIN 0.4 MG/1
0.4 TABLET SUBLINGUAL EVERY 5 MIN PRN
Status: DISCONTINUED | OUTPATIENT
Start: 2019-08-08 | End: 2019-08-14 | Stop reason: HOSPADM

## 2019-08-08 RX ORDER — METOPROLOL TARTRATE 1 MG/ML
5 INJECTION, SOLUTION INTRAVENOUS EVERY 6 HOURS
Status: DISCONTINUED | OUTPATIENT
Start: 2019-08-08 | End: 2019-08-11

## 2019-08-08 RX ORDER — METOPROLOL TARTRATE 1 MG/ML
2.5 INJECTION, SOLUTION INTRAVENOUS EVERY 4 HOURS PRN
Status: DISCONTINUED | OUTPATIENT
Start: 2019-08-08 | End: 2019-08-08

## 2019-08-08 RX ORDER — METOPROLOL TARTRATE 1 MG/ML
2.5 INJECTION, SOLUTION INTRAVENOUS EVERY 6 HOURS
Status: DISCONTINUED | OUTPATIENT
Start: 2019-08-08 | End: 2019-08-08

## 2019-08-08 RX ORDER — METOPROLOL TARTRATE 1 MG/ML
5 INJECTION, SOLUTION INTRAVENOUS EVERY 4 HOURS PRN
Status: DISCONTINUED | OUTPATIENT
Start: 2019-08-08 | End: 2019-08-14 | Stop reason: HOSPADM

## 2019-08-08 RX ORDER — LIDOCAINE 40 MG/G
CREAM TOPICAL
Status: DISCONTINUED | OUTPATIENT
Start: 2019-08-08 | End: 2019-08-08

## 2019-08-08 RX ORDER — BISACODYL 10 MG
10 SUPPOSITORY, RECTAL RECTAL ONCE
Status: COMPLETED | OUTPATIENT
Start: 2019-08-08 | End: 2019-08-08

## 2019-08-08 RX ORDER — SODIUM CHLORIDE 450 MG/100ML
INJECTION, SOLUTION INTRAVENOUS CONTINUOUS
Status: DISCONTINUED | OUTPATIENT
Start: 2019-08-08 | End: 2019-08-08

## 2019-08-08 RX ORDER — METOPROLOL TARTRATE 1 MG/ML
2.5 INJECTION, SOLUTION INTRAVENOUS ONCE
Status: COMPLETED | OUTPATIENT
Start: 2019-08-08 | End: 2019-08-08

## 2019-08-08 RX ADMIN — BUDESONIDE 0.5 MG: 0.5 INHALANT RESPIRATORY (INHALATION) at 19:52

## 2019-08-08 RX ADMIN — METOPROLOL TARTRATE 2.5 MG: 5 INJECTION INTRAVENOUS at 08:51

## 2019-08-08 RX ADMIN — Medication 10 MEQ: at 08:42

## 2019-08-08 RX ADMIN — UMECLIDINIUM 1 PUFF: 62.5 AEROSOL, POWDER ORAL at 19:28

## 2019-08-08 RX ADMIN — METOPROLOL TARTRATE 2.5 MG: 1 INJECTION, SOLUTION INTRAVENOUS at 08:04

## 2019-08-08 RX ADMIN — DILTIAZEM HYDROCHLORIDE 5 MG/HR: 5 INJECTION INTRAVENOUS at 22:01

## 2019-08-08 RX ADMIN — Medication 10 MEQ: at 13:23

## 2019-08-08 RX ADMIN — Medication 10 MEQ: at 15:04

## 2019-08-08 RX ADMIN — ARFORMOTEROL TARTRATE 15 MCG: 15 SOLUTION RESPIRATORY (INHALATION) at 19:52

## 2019-08-08 RX ADMIN — METOPROLOL TARTRATE 5 MG: 5 INJECTION INTRAVENOUS at 19:50

## 2019-08-08 RX ADMIN — HEPARIN SODIUM 5000 UNITS: 5000 INJECTION, SOLUTION INTRAVENOUS; SUBCUTANEOUS at 10:32

## 2019-08-08 RX ADMIN — HEPARIN SODIUM 5000 UNITS: 5000 INJECTION, SOLUTION INTRAVENOUS; SUBCUTANEOUS at 23:51

## 2019-08-08 RX ADMIN — ERTAPENEM SODIUM 1 G: 1 INJECTION, POWDER, LYOPHILIZED, FOR SOLUTION INTRAMUSCULAR; INTRAVENOUS at 10:49

## 2019-08-08 RX ADMIN — HYDROMORPHONE HYDROCHLORIDE 0.5 MG: 1 INJECTION, SOLUTION INTRAMUSCULAR; INTRAVENOUS; SUBCUTANEOUS at 08:49

## 2019-08-08 RX ADMIN — ARFORMOTEROL TARTRATE 15 MCG: 15 SOLUTION RESPIRATORY (INHALATION) at 07:56

## 2019-08-08 RX ADMIN — Medication 10 MG: at 15:04

## 2019-08-08 RX ADMIN — METOPROLOL TARTRATE 5 MG: 5 INJECTION INTRAVENOUS at 20:38

## 2019-08-08 RX ADMIN — SODIUM CHLORIDE: 4.5 INJECTION, SOLUTION INTRAVENOUS at 10:23

## 2019-08-08 RX ADMIN — METOPROLOL TARTRATE 2.5 MG: 5 INJECTION INTRAVENOUS at 08:04

## 2019-08-08 RX ADMIN — METOPROLOL TARTRATE 5 MG: 5 INJECTION INTRAVENOUS at 15:04

## 2019-08-08 RX ADMIN — Medication 10 MEQ: at 10:23

## 2019-08-08 RX ADMIN — ALBUTEROL SULFATE 2.5 MG: 2.5 SOLUTION RESPIRATORY (INHALATION) at 05:30

## 2019-08-08 RX ADMIN — BUDESONIDE 0.5 MG: 0.5 INHALANT RESPIRATORY (INHALATION) at 07:56

## 2019-08-08 ASSESSMENT — ACTIVITIES OF DAILY LIVING (ADL)
ADLS_ACUITY_SCORE: 18
ADLS_ACUITY_SCORE: 17
ADLS_ACUITY_SCORE: 17
IADL_COMMENTS: SEE ABOVE

## 2019-08-08 ASSESSMENT — MIFFLIN-ST. JEOR: SCORE: 841.38

## 2019-08-08 NOTE — PROGRESS NOTES
[facility]    Increase metoprolol  Aggressive potassium replacement,  Add magnesium  Consider diltiazem drip    Hospitalist Progress Note    Assessment & Plan   Melissa Alfaro is a 85 year old female with PMHx of hypertension, hyperlipidemia, paroxysmal afib (not on chronic anticoagulation), COPD, pulmonary hypertension, COPD, carotid stenosis, IBS, insomonia and anxiety among other medical problems who was admitted on 8/4/2019 with abdominal pain secondary to SBO vs ileus.    Patient recently moved from [place], [place] to the [facility] to live with her daughter. Most of her previous care was through [facility].    SBO, s/p exp lap with lysis of adhesions and resection of small bowel on 8/7/19   Presented with complaints of abdominal pain that began the morning of admission. Some nausea but no vomiting. No diarrhea. In ED was afebrile and hemodynamically stable. WBC 28. LFTs, lactate and lipase all nl. CT abd/pelvis obtained and showed multiple dilated loops of small bowel in the lower abd with no discrete transition point -- suggestive of partial SBP vs ileus and mild to mod intraabdominal and pelvis ascites with mild wall thickening of the ascending colon and several loops of small bowel (suggestive of enteritis vs colitis). NGT placed. Initiated on bowel rest and supportive cares with IVFs, antiemetics, pain meds and antibiotics.     General surgery followed closely. Had ongoing large output per NG, abd pain and persistent leukocytosis. Serial lactates nl. Had repeat CT on 8/7 which showed persistent SBO with contrast (given on 8/6) in small bowel only. Taken for emergent exp lap per Dr. Valente. Underwent exploratory laparotomy with lysis of adhesions and partial small bowel resection. Intraop findings were notable for an adhesive band around the small bowel prompting resection of a section of nonviable small bowel, also noted to have 600ml bloody ascites.  -- routine postop cares per  general surgery including pain control and DVT ppx  -- abx changed from cipro/flagyl to ertapenem postop  -- remains NPO  -- cont other supportive cares as below    SANDRA: Improved  hypernatremia  Baseline Cr seems to be around 0.9 - 1.1. No reported hx of CKD. Cr 1.28 on presentation.   Cr initially trended up despite IVFs, peaked at 1.68 on 8/5. At that time, urine output was noted to be low per RN so was given an additional 500ml fluid bolus  -- Cr improved  - D5 for hypernatremia. Stop and consider diuretics if increasing O2 use.     Possible Pyelonephritis  Noted on CT to have horseshoe shaped kidneys and some perinephric stranding suggestive of pyelonephritis. UA on admission showed lg leuk est with 21 WBCs, 3 RBCs and few squam cells. Per RN, urine output initially low -- as she had worsening renal function on 8/5, she was given an additional 500ml fluid bolus this morning and cont IVFs with NS @100ml/h (will need to monitor respiratory status given hx of severe diastolic dysfunction)  -- place on IV Cipro on admission dt PCN allergy (causes hives/edema), changed to ertapenem postop  -- urine culture drawn on admission grew 50-100k strep agalactiae (sensitive to PCNs, Vanco and cephalosporins) -- unclear how much these findings have been contributing to current condition given above  -- blood culture drawn on admission remains negative    Hypertension  Hyperlipidemia  Paroxysmal Afib  Chronic diastolic dysfunction (reported as severe on last echo in 4/2018)  Last echo in 4/2018 showed probable severe diastolic dysfunction of the left ventricle, EF 65-70%, mod to severe LAE and mild MR; RVSF nl.  PTA meds: atenolol 50mg daily, amlodipine 10mg daily. Not on chronic anticoagulation, aspirin or statin  CT abdomen revealed some bilateral pleural effusion on 8/6  -- atenolol and amlodipine continued on admission (BPs stable)  -- requiring IVFs this stay dt above -- need to monitor closely for potential volume  overload   -- patient will desaturate to 88% at rest on room air.   - Consider diuretics, but at this point hold off, as nurse reports she uses O2 during the day at home  - CXR    COPD  Chronic and stable on home inhalers, including aformoterol, budesonide, tiotropium and prn albuterol. No s/sx of exacerbation  Nurse reports 2 liters of NC overnight and sometimes during the day, so may be some component of why she has some mild desaturations  -- monitor respiratory status    FEN: cont NS at 50ml/h, lytes stable, diet per surgery -- NPO for now  DVT Prophylaxis: PCDs, subQ heparin  Code Status: Full Code     Disposition: Discharge date unclear, pending postop course and stable respiratory status -- suspect 3+ days still. Will eventually need reassessment per PT/OT. Anticipate TCU stay will be recommended.    Family at bedside and in agreement with care plan.    Corey Putnam    Interval History   Did better with PT. Some shortness of breath unchanged from previously. No chest pain. Still with NG output, remains in place. Surgical plan reviewed, awaiting bowel function    -Data reviewed today: I reviewed all new labs and imaging results over the last 24 hours. I personally reviewed no images or EKG's today.    Physical Exam   Temp: 98.1  F (36.7  C) Temp src: Axillary BP: 116/61 Pulse: 96 Heart Rate: 152 Resp: 16 SpO2: 91 % O2 Device: Nasal cannula Oxygen Delivery: 2 LPM  Vitals:    08/06/19 0701 08/07/19 0615 08/08/19 0534   Weight: 45.8 kg (100 lb 15.5 oz) 44.6 kg (98 lb 5.2 oz) 45.9 kg (101 lb 3.1 oz)     Vital Signs with Ranges  Temp:  [96.3  F (35.7  C)-98.4  F (36.9  C)] 98.1  F (36.7  C)  Pulse:  [74-96] 96  Heart Rate:  [] 152  Resp:  [11-30] 16  BP: (112-159)/(49-72) 116/61  SpO2:  [88 %-100 %] 91 %  I/O last 3 completed shifts:  In: 2150 [P.O.:100; I.V.:2000; NG/GT:50]  Out: 2735 [Urine:1025; Emesis/NG output:1700; Blood:10]    Constitutional: Resting quietly in bed, alert, answering questions  appropriately, NAD  Respiratory: BS diminished at bilateral lung bases with faint bibasilar crackles, no wheeze, no increased work of breathing  Cardiovascular: HRRR, no MGR, no LE edema  GI: appropriately TTP  Skin/Integumen: warm/dry  Other: +NG tube in place    Medications     sodium chloride 50 mL/hr at 08/07/19 1805       amLODIPine  10 mg Oral Daily     arformoterol  15 mcg Nebulization BID     bisacodyl  10 mg Rectal Once     budesonide  0.5 mg Nebulization BID     ertapenem (INVanz) IV  1 g Intravenous Q24H     heparin  5,000 Units Subcutaneous Q12H     metoprolol  2.5 mg Intravenous Q6H     metoprolol  2.5 mg Intravenous Once     sodium chloride (PF)  3 mL Intracatheter Q8H     umeclidinium  1 puff Inhalation Daily       Data   Recent Labs   Lab 08/08/19  0749 08/07/19  1200 08/07/19  0850 08/06/19  0649  08/04/19  1151   WBC 13.5*  --  20.4* 24.8*   < > 28.0*   HGB 9.3*  --  10.0* 9.7*   < > 12.3   MCV 90  --  90 90   < > 89    299 349 264   < > 377   *  --  147* 141   < > 137   POTASSIUM 3.0*  --  3.7 4.0   < > 4.4   CHLORIDE 116*  --  115* 112*   < > 101   CO2 28  --  24 22   < > 25   BUN 27  --  35* 40*   < > 33*   CR 0.96  --  1.13* 1.24*   < > 1.28*   ANIONGAP 9  --  8 7   < > 11   LUIS ENRIQUE 8.5  --  9.5 8.4*   < > 9.6   *  --  133* 127*   < > 199*   ALBUMIN  --   --   --   --   --  3.8   PROTTOTAL  --   --   --   --   --  7.6   BILITOTAL  --   --   --   --   --  0.6   ALKPHOS  --   --   --   --   --  58   ALT  --   --   --   --   --  21   AST  --   --   --   --   --  22   LIPASE  --   --   --   --   --  131    < > = values in this interval not displayed.       No results found for this or any previous visit (from the past 24 hour(s)).

## 2019-08-08 NOTE — PROVIDER NOTIFICATION
MD Notification    Person notified:on call    Person Name:Dory Pardo    Date/Time:8/8/19  0697    Interaction:page    Purpose of Notification:Patient complaining of SOB, tachy irregular HR noted, stat EKG showing afib RVR, pt has hx of afib and am heart meds, but pt is NPO. Please advise.    Orders Received: PRN and scheduled IV Metoprolol 2.5 mg ordered.    Comments:

## 2019-08-08 NOTE — PROGRESS NOTES
08/08/19 1400   Quick Adds   Type of Visit Initial Occupational Therapy Evaluation   Living Environment   Lives With child(marycarmen), adult   Living Arrangements house   Living Environment Comment Patient report was slightly jumbled, unsure if she reported about her house or her daughter's.8 NIECY w/ railing, tub shower with grab bars, regular toilet.    Functional Level   Ambulation 0-->independent   Transferring 0-->independent   Toileting 0-->independent   Bathing 0-->independent   Dressing 0-->independent   Eating 0-->independent   Communication 0-->understands/communicates without difficulty   Fall history within last six months yes   Number of times patient has fallen within last six months 1   Prior Functional Level Comment Patient reported driving, cooking, and managing medications. But later questions led to patient report that daughter helps with medications - indicating contradicting information.   General Information   Onset of Illness/Injury or Date of Surgery - Date 08/04/19   Referring Physician DO Azalia   Patient/Family Goals Statement none stated   Additional Occupational Profile Info/Pertinent History of Current Problem Patient is 85-yr-old female admitted with abdominal pain and distension. Patient had exploratory laparotomy with lysis of adhesions and partial SBO on 8/7/19. PMH includes HTN, hyperlipidemia, paroxysmal afib, COPD, pulmonary HTN, carotid stenosis, IBS, insomnia, and anxiety.   Precautions/Limitations abdominal precautions   General Info Comments Patient O2 sats stable on RA   Cognitive Status Examination   Orientation person  (reported it was 7/16/19 and in [place], [place])   Level of Consciousness alert   Cognitive Comment Patient appears to be questionable historian, reporting contradicting PLOF information. Patient was unable to identify date or where she was.   Visual Perception   Visual Perception Wears glasses   Sensory Examination   Sensory Comments no numbness or tingling   Pain  "Assessment   Patient Currently in Pain No   Posture   Posture Comments forward leaning, instructed to stand up straight   Range of Motion (ROM)   ROM Comment WFL    Strength   Strength Comments WFL (within abdominal precautions)   Hand Strength   Hand Strength Comments strong  in both hands   Transfer Skill: Sit to Stand   Level of [place]: Sit/Stand contact guard   Physical Assist/Nonphysical Assist: Sit/Stand 1 person assist  (used IV pole for stability )   Transfer Skill: Toilet Transfer   Level of [place]: Toilet contact guard   Balance   Balance Comments fair   Lower Body Dressing   Level of [place]: Dress Lower Body contact guard  (within abdominal precautions)   Grooming   Level of [place]: Grooming contact guard  (combing hair and brushing teeth )   Instrumental Activities of Daily Living (IADL)   IADL Comments see above   Activities of Daily Living Analysis   Impairments Contributing to Impaired Activities of Daily Living cognition impaired;post surgical precautions;strength decreased   General Therapy Interventions   Planned Therapy Interventions ADL retraining;cognition   Clinical Impression   Criteria for Skilled Therapeutic Interventions Met yes, treatment indicated   OT Diagnosis decreased ADL/IADL   Influenced by the following impairments decreased strength, abdominal precautions, impaired cognition   Assessment of Occupational Performance 1-3 Performance Deficits   Identified Performance Deficits dressing, transfers, functional mobility, bathing   Clinical Decision Making (Complexity) Low complexity   Therapy Frequency Daily   Predicted Duration of Therapy Intervention (days/wks) 3 days   Anticipated Discharge Disposition Transitional Care Facility   Risks and Benefits of Treatment have been explained. Yes   Patient, Family & other staff in agreement with plan of care Yes   [facility] AM-PAC TM \"6 Clicks\"   2016, Trustees of [facility], under license to [employer] " "LL[employer].  All rights reserved.   6 Clicks Short Forms Daily Activity Inpatient Short Form   [facility] AM-PAC  \"6 Clicks\" Daily Activity Inpatient Short Form   1. Putting on and taking off regular lower body clothing? 2 - A Lot   2. Bathing (including washing, rinsing, drying)? 2 - A Lot   3. Toileting, which includes using toilet, bedpan or urinal? 2 - A Lot   4. Putting on and taking off regular upper body clothing? 3 - A Little   5. Taking care of personal grooming such as brushing teeth? 4 - None   6. Eating meals? 4 - None   Daily Activity Raw Score (Score out of 24.Lower scores equate to lower levels of function) 17   Total Evaluation Time   Total Evaluation Time (Minutes) 10     "

## 2019-08-08 NOTE — PLAN OF CARE
Alert to self only. Tele NSR/ST at times, converted from A.fib with RVR this am. Midline dressing CDI. Abd binder. NG to LIS. NPO with ice chips. Dilaudid x1 for pain. D5 @ 75. Up with Ax1, walker/GB. Doran, pull tomorrow. Denies flatus, suppository given.

## 2019-08-08 NOTE — PLAN OF CARE
Discharge Planner PT   Patient plan for discharge: Pt states she plans to return to home living with her daughter before transitioning to a care center  Current status: Orders received, eval complete, treatment initiated. Prior to admission, Pt independent in all functional mobility and self-cares. Pt currently lives with her daughter, but plans on moving into a care center. Currently, Pt requires SBA for sit <> stands due to unsteadiness once on her feet. Required min Ax1 to ambulate without walker and tends to reach out to objects to hold onto (railings, hand of therapist). Pt would benefit from FWW training while in the hospital and potentially once discharged to improve balance and confidence while walking. Pt needed 2 L/min O2 while ambulating.  Barriers to return to prior living situation: Level of assist, Decreased activity tolerance, Use of O2, Decreased balance  Recommendations for discharge: TCU  Rationale for recommendations: Pt is below baseline in regards to independent functional mobility and would benefit from continued therapy in order to increase activity tolerance and improve balance.       Entered by: Tish Barreto 08/08/2019 2:42 PM

## 2019-08-08 NOTE — PLAN OF CARE
Discharge Planner OT     Patient plan for discharge: none stated    Current status: Orders received, chart reviewed, eval complete, and treatment initiated. Patient sit <> stand with CGA, FWW. Patient ambulating with CGA, FWW in room. Patient SBA for LE dressing and g/h at sink.     Barriers to return to prior living situation: level of A, decreased strength, impaired cognition    Recommendations for discharge: TCU    Rationale for recommendations: Patient is below baseline in ADL/IADL, appears to be a questionable historian. Patient would benefit from continued OT services to address decreased ADL/IADL and safety.          Entered by: Janet Porter 08/08/2019 2:29 PM

## 2019-08-08 NOTE — PROGRESS NOTES
"General Surgery Progress Note    Subjective: pt reports no flatus yet. Has abdominal pain, pain medications help. No nausea, taking a lot of ice chips. NGT output high but clear.     Objective: /61   Pulse 96   Temp 98.1  F (36.7  C) (Axillary)   Resp 20   Ht 1.549 m (5' 1\")   Wt 45.9 kg (101 lb 3.1 oz)   SpO2 91%   BMI 19.12 kg/m    Gen: alert, no distress  CV: RRR  Pulm: nonlabored breathing  Abd: mildly distended, incision with bandage is c/d/i  Ext: WWP    Assessment/Plan:   Melissa Alfaro  is a 85 year old female POD 1 s/p ex lap with small bowel resection due to bowel ischemia from adhesive band.   - await bowel function  - continue ngt for now  - dulcolax suppository  - invanz 24hrs  - remove wiley catheter tomorrow  - ok to start dvt ppx today    Avani Valente MD  [facility]  [phone]              "

## 2019-08-08 NOTE — PROVIDER NOTIFICATION
MD notified: PRN IV Metoprolol given for A-FIB RVR Hr 130-150's, HR remains 150's. New orders received. Bedside RN updated

## 2019-08-08 NOTE — PROGRESS NOTES
08/08/19 1100   Quick Adds   Type of Visit Initial PT Evaluation   Living Environment   Lives With child(marycarmen), adult   Living Arrangements apartment   Home Accessibility stairs to enter home   Number of Stairs, Main Entrance 7   Stair Railings, Main Entrance railing on right side (ascending)   Transportation Anticipated family or friend will provide   Living Environment Comment Pt states she will be returning to her daughter's home for a week before transitioning to a care center   Self-Care   Usual Activity Tolerance moderate   Current Activity Tolerance moderate   Regular Exercise Yes   Activity/Exercise Type other (see comments)  (Housework/yardwork)   Exercise Amount/Frequency daily   Equipment Currently Used at Home none   Activity/Exercise/Self-Care Comment Pt states she does housework for exercise and currently doesn't have any assistive devices at her daughter's house   Functional Level Prior   Ambulation 0-->independent   Transferring 0-->independent   Toileting 0-->independent   Bathing 0-->independent   Communication 0-->understands/communicates without difficulty   Cognition 1 - attention or memory deficits   Fall history within last six months yes   Number of times patient has fallen within last six months   (1 fall in yard, broke finger- 2 months ago)   Prior Functional Level Comment Pt says she was independent in all prior functional mobility and self-cares. However, some information seems to contradict previously entered information so daughter should be asked to confirm   General Information   Onset of Illness/Injury or Date of Surgery - Date 08/04/19   Referring Physician Michelle Vieyra MD   Patient/Family Goals Statement Return home with daughter   Pertinent History of Current Problem (include personal factors and/or comorbidities that impact the POC) Melissa Alfaro is a 85 year old female with PMHx of hypertension, hyperlipidemia, paroxysmal afib (not on chronic anticoagulation), COPD,  "pulmonary hypertension, COPD, carotid stenosis, IBS, insomonia and anxiety among other medical problems who was admitted on 8/4/2019 with abdominal pain secondary to SBO vs ileus.   Precautions/Limitations fall precautions;oxygen therapy device and L/min   Weight-Bearing Status - LUE full weight-bearing   Weight-Bearing Status - RUE full weight-bearing   Weight-Bearing Status - LLE full weight-bearing   Weight-Bearing Status - RLE full weight-bearing   General Observations Pt sitting in chair upon therapist arrival   Cognitive Status Examination   Orientation person   Level of Consciousness alert   Follows Commands and Answers Questions 100% of the time   Personal Safety and Judgment intact   Memory impaired  (Pt forgets some commands)   Cognitive Comment Pt stated the month was \"July\" and that she was in \"[place]\"   Pain Assessment   Patient Currently in Pain Yes, see Vital Sign flowsheet  (2/10; abdominal pain)   Integumentary/Edema   Integumentary/Edema no deficits were identifed   Posture    Posture Forward head position;Kyphosis   Range of Motion (ROM)   ROM Quick Adds No deficits were identified   Strength   Manual Muscle Testing Quick Adds No deficits were identified   Strength Comments LE strength assessed and is WFL   Bed Mobility   Bed Mobility Comments Not assessed today   Transfer Skills   Transfer Comments Pt able to perform sit <> stand from chair with VC to push from armrests. Required min Ax1 for balance once standing.   Gait   Gait Comments Pt required min Ax1 to ambulate and tended to reach out to the walls/railings for balance and asked to hold hand of therapist   Balance   Balance Comments Pt was unsteady once on her feet and needed to grab onto objects/hands of therapist when ambulating   General Therapy Interventions   Planned Therapy Interventions balance training;bed mobility training;gait training;transfer training   Clinical Impression   Criteria for Skilled Therapeutic Intervention yes, " "treatment indicated   PT Diagnosis Diffculty with ambulation   Influenced by the following impairments impaired balance   Functional limitations due to impairments decreased activity tolerance, Difficulty with ambulation and transfer independence   Clinical Presentation Stable/Uncomplicated   Clinical Presentation Rationale Pt is medically stable   Clinical Decision Making (Complexity) Low complexity   Therapy Frequency Daily   Predicted Duration of Therapy Intervention (days/wks) 3 days   Anticipated Equipment Needs at Discharge walker  (May need walker for balance)   Anticipated Discharge Disposition Home with Assist   Risk & Benefits of therapy have been explained Yes   Patient, Family & other staff in agreement with plan of care Yes   [facility] AM-PAC  \"6 Clicks\" V.2 Basic Mobility Inpatient Short Form   1. Turning from your back to your side while in a flat bed without using bedrails? 3 - A Little   2. Moving from lying on your back to sitting on the side of a flat bed without using bedrails? 3 - A Little   3. Moving to and from a bed to a chair (including a wheelchair)? 3 - A Little   4. Standing up from a chair using your arms (e.g., wheelchair, or bedside chair)? 3 - A Little   5. To walk in hospital room? 3 - A Little   6. Climbing 3-5 steps with a railing? 2 - A Lot   Basic Mobility Raw Score (Score out of 24.Lower scores equate to lower levels of function) 17   Total Evaluation Time   Total Evaluation Time (Minutes) 15     "

## 2019-08-08 NOTE — PLAN OF CARE
[id number]: POD 1, Patient disoriented to place and time, pleasant. VSS and Cap[place] WNL on 2 L O2, unable to wean O2 tonight but patient states she wears O2 at night at home. Complains of incisional pain in abdomen, dilaudid x1 effective. BS active, no flatus yet. NG to LIS, 1250 ml bile output, pt denies nausea. Dressing on midline incision CDI, binder in place. Patient up with assist of 1, ambulated in [place] x1 overnight. PIV infusing, tolerating NPO with minimal ice chips. Doran with adequate output. Discharge plan pending, most likely 3 + days, then to a TCU.

## 2019-08-08 NOTE — PROGRESS NOTES
Xcoverage: paged by RN because pt went into Afib with RVR; she has h/o paroxysmal Afib, PTA on Atenolol 50 mg po daily; the patient is npo currently; will stop po Atenolol and start scheduled Metoprolol 2.5 mg iv q6h; also Metoprolol iv prn available for HR>120.    Misty Pardo MD

## 2019-08-09 ENCOUNTER — APPOINTMENT (OUTPATIENT)
Dept: GENERAL RADIOLOGY | Facility: CLINIC | Age: 84
DRG: 330 | End: 2019-08-09
Attending: HOSPITALIST
Payer: COMMERCIAL

## 2019-08-09 ENCOUNTER — APPOINTMENT (OUTPATIENT)
Dept: PHYSICAL THERAPY | Facility: CLINIC | Age: 84
DRG: 330 | End: 2019-08-09
Payer: COMMERCIAL

## 2019-08-09 ENCOUNTER — APPOINTMENT (OUTPATIENT)
Dept: OCCUPATIONAL THERAPY | Facility: CLINIC | Age: 84
DRG: 330 | End: 2019-08-09
Payer: COMMERCIAL

## 2019-08-09 LAB
ANION GAP SERPL CALCULATED.3IONS-SCNC: 5 MMOL/L (ref 3–14)
BUN SERPL-MCNC: 21 MG/DL (ref 7–30)
CALCIUM SERPL-MCNC: 8.2 MG/DL (ref 8.5–10.1)
CHLORIDE SERPL-SCNC: 110 MMOL/L (ref 94–109)
CO2 SERPL-SCNC: 38 MMOL/L (ref 20–32)
CREAT SERPL-MCNC: 0.79 MG/DL (ref 0.52–1.04)
GFR SERPL CREATININE-BSD FRML MDRD: 68 ML/MIN/{1.73_M2}
GLUCOSE BLDC GLUCOMTR-MCNC: 136 MG/DL (ref 70–99)
GLUCOSE BLDC GLUCOMTR-MCNC: 142 MG/DL (ref 70–99)
GLUCOSE SERPL-MCNC: 151 MG/DL (ref 70–99)
INTERPRETATION ECG - MUSE: NORMAL
INTERPRETATION ECG - MUSE: NORMAL
LACTATE BLD-SCNC: 1.2 MMOL/L (ref 0.7–2)
POTASSIUM SERPL-SCNC: 2.8 MMOL/L (ref 3.4–5.3)
SODIUM SERPL-SCNC: 153 MMOL/L (ref 133–144)

## 2019-08-09 PROCEDURE — 25000128 H RX IP 250 OP 636: Performed by: PHYSICIAN ASSISTANT

## 2019-08-09 PROCEDURE — 25000125 ZZHC RX 250: Performed by: PHYSICIAN ASSISTANT

## 2019-08-09 PROCEDURE — 25000125 ZZHC RX 250: Performed by: HOSPITALIST

## 2019-08-09 PROCEDURE — 83605 ASSAY OF LACTIC ACID: CPT | Performed by: HOSPITALIST

## 2019-08-09 PROCEDURE — 97530 THERAPEUTIC ACTIVITIES: CPT | Mod: GP | Performed by: PHYSICAL THERAPIST

## 2019-08-09 PROCEDURE — 97535 SELF CARE MNGMENT TRAINING: CPT | Mod: GO

## 2019-08-09 PROCEDURE — 99232 SBSQ HOSP IP/OBS MODERATE 35: CPT | Performed by: HOSPITALIST

## 2019-08-09 PROCEDURE — 71046 X-RAY EXAM CHEST 2 VIEWS: CPT

## 2019-08-09 PROCEDURE — 94640 AIRWAY INHALATION TREATMENT: CPT | Mod: 76

## 2019-08-09 PROCEDURE — 99222 1ST HOSP IP/OBS MODERATE 55: CPT | Performed by: INTERNAL MEDICINE

## 2019-08-09 PROCEDURE — 12000000 ZZH R&B MED SURG/OB

## 2019-08-09 PROCEDURE — 40000275 ZZH STATISTIC RCP TIME EA 10 MIN

## 2019-08-09 PROCEDURE — 25800030 ZZH RX IP 258 OP 636: Performed by: STUDENT IN AN ORGANIZED HEALTH CARE EDUCATION/TRAINING PROGRAM

## 2019-08-09 PROCEDURE — 00000146 ZZHCL STATISTIC GLUCOSE BY METER IP

## 2019-08-09 PROCEDURE — 36415 COLL VENOUS BLD VENIPUNCTURE: CPT | Performed by: HOSPITALIST

## 2019-08-09 PROCEDURE — 25800030 ZZH RX IP 258 OP 636: Performed by: HOSPITALIST

## 2019-08-09 PROCEDURE — 25000132 ZZH RX MED GY IP 250 OP 250 PS 637: Performed by: SURGERY

## 2019-08-09 PROCEDURE — 25000125 ZZHC RX 250: Performed by: STUDENT IN AN ORGANIZED HEALTH CARE EDUCATION/TRAINING PROGRAM

## 2019-08-09 PROCEDURE — 80048 BASIC METABOLIC PNL TOTAL CA: CPT | Performed by: HOSPITALIST

## 2019-08-09 PROCEDURE — 84132 ASSAY OF SERUM POTASSIUM: CPT | Performed by: HOSPITALIST

## 2019-08-09 PROCEDURE — 97116 GAIT TRAINING THERAPY: CPT | Mod: GP | Performed by: PHYSICAL THERAPIST

## 2019-08-09 RX ORDER — BISACODYL 10 MG
10 SUPPOSITORY, RECTAL RECTAL ONCE
Status: COMPLETED | OUTPATIENT
Start: 2019-08-09 | End: 2019-08-09

## 2019-08-09 RX ADMIN — HYDROMORPHONE HYDROCHLORIDE 0.3 MG: 1 INJECTION, SOLUTION INTRAMUSCULAR; INTRAVENOUS; SUBCUTANEOUS at 04:37

## 2019-08-09 RX ADMIN — HEPARIN SODIUM 5000 UNITS: 5000 INJECTION, SOLUTION INTRAVENOUS; SUBCUTANEOUS at 13:24

## 2019-08-09 RX ADMIN — Medication 10 MEQ: at 15:20

## 2019-08-09 RX ADMIN — ALBUTEROL SULFATE 2.5 MG: 2.5 SOLUTION RESPIRATORY (INHALATION) at 07:57

## 2019-08-09 RX ADMIN — DOCUSATE SODIUM 286 ML: 50 LIQUID ORAL at 18:55

## 2019-08-09 RX ADMIN — METOPROLOL TARTRATE 5 MG: 5 INJECTION INTRAVENOUS at 08:37

## 2019-08-09 RX ADMIN — UMECLIDINIUM 1 PUFF: 62.5 AEROSOL, POWDER ORAL at 17:32

## 2019-08-09 RX ADMIN — METOPROLOL TARTRATE 5 MG: 5 INJECTION INTRAVENOUS at 22:03

## 2019-08-09 RX ADMIN — Medication 10 MEQ: at 09:26

## 2019-08-09 RX ADMIN — Medication 10 MEQ: at 11:06

## 2019-08-09 RX ADMIN — DEXTROSE MONOHYDRATE: 50 INJECTION, SOLUTION INTRAVENOUS at 04:45

## 2019-08-09 RX ADMIN — DEXTROSE MONOHYDRATE: 50 INJECTION, SOLUTION INTRAVENOUS at 17:32

## 2019-08-09 RX ADMIN — METOPROLOL TARTRATE 5 MG: 5 INJECTION INTRAVENOUS at 01:30

## 2019-08-09 RX ADMIN — BUDESONIDE 0.5 MG: 0.5 INHALANT RESPIRATORY (INHALATION) at 07:57

## 2019-08-09 RX ADMIN — ARFORMOTEROL TARTRATE 15 MCG: 15 SOLUTION RESPIRATORY (INHALATION) at 18:51

## 2019-08-09 RX ADMIN — BUDESONIDE 0.5 MG: 0.5 INHALANT RESPIRATORY (INHALATION) at 18:51

## 2019-08-09 RX ADMIN — ERTAPENEM SODIUM 1 G: 1 INJECTION, POWDER, LYOPHILIZED, FOR SOLUTION INTRAMUSCULAR; INTRAVENOUS at 08:44

## 2019-08-09 RX ADMIN — Medication 10 MG: at 13:24

## 2019-08-09 RX ADMIN — Medication 10 MEQ: at 13:23

## 2019-08-09 RX ADMIN — METOPROLOL TARTRATE 5 MG: 5 INJECTION INTRAVENOUS at 14:56

## 2019-08-09 RX ADMIN — DILTIAZEM HYDROCHLORIDE 10 MG/HR: 5 INJECTION INTRAVENOUS at 10:39

## 2019-08-09 RX ADMIN — Medication 10 MEQ: at 16:27

## 2019-08-09 RX ADMIN — Medication 10 MEQ: at 12:15

## 2019-08-09 ASSESSMENT — ACTIVITIES OF DAILY LIVING (ADL)
ADLS_ACUITY_SCORE: 18
ADLS_ACUITY_SCORE: 17
ADLS_ACUITY_SCORE: 17
ADLS_ACUITY_SCORE: 18

## 2019-08-09 ASSESSMENT — MIFFLIN-ST. JEOR: SCORE: 826.91

## 2019-08-09 NOTE — SIGNIFICANT EVENT
Significant Event Note    Time of event: 9:18 PM August 8, 2019    Description of event:    Patient now in Afib with RVR. VSS  Despite IV metoprolol    Plan:    Start Cardizem infusion  IM status  Consider Cardiology consult in AM    Discussed with: bedside nurse    Sylvain Garces MD

## 2019-08-09 NOTE — CONSULTS
[facility]    Cardiology Consultation     Date of Admission:  8/4/2019  Service date: 8/9/2019    Summary:  Ms. Melissa Alfaro is a very pleasant 85 year old female with a history of COPD, anxiety, irritable bowel syndrome, hypertension, hyperlipidemia, carotid artery stenosis, chronic diastolic dysfunction, and atrial fibrillation who was admitted on 8/9/2019 for abdominal pain and nausea. I was asked to see the patient for atrial fibrillation post operatively following an exploratory laparotomy, lysis of adhesions, and small bowel resection on 8/7/2019 for a small bowel obstruction.    Assessment & Plan      1. Paroxsymal Atrial fibrillation   - Clinically in sinus rhythm at this time   - Suspect hypokalemia and NPO status for the last several days is contributing to her runs of A.Fib during her hospitalization.   - [id number] Score 4-5 based on age of 85, gender, and history of hypertension. It's not clear why she has not previously been anticoagulated with known history of A.Fib.   2. Small bowel obstruction  - Status post exp lap with lysis of adhesions and resection of small bowel on 8/7/19.   - Remains NPO with NG tube in place.  3. SANDRA   -Improved  4. Hypernatremia  5. Hypokalemia  - Being supplemented.  6. COPD  7. Hypertension  8. Hyperlipidemia  9. Chronic diastolic dysfunction  10. Carotid artery stenosis    Plan:  1. Continue current plan with IV metoprolol. Can continue PTA atenolol 50 mg PO daily for rate control once able to tolerate PO.  2. Could consider a Zio patch or Holter monitor at discharge to assess her overall burden of A.Fib and adequacy of rate control once her nutrition is improved and electrolytes are stabilized.  3. Recommend follow up in clinic to discuss the option of starting anticoagulation depending on her continued A.Fib burden given her high [id number] score.    Thank you for the opportunity to participate in this pleasant patient's care.     Donald  THOMAS Mittal  Text Page  (8am - 5pm, M-F)    Code Status    Full Code    Reason for Consult   Reason for consult: I was asked by Dr. Corey Putnam to evaluate this patient for post operative atrial fibrillation.    Primary Care Physician   Sudheer Tariq    Chief Complaint   Atrial fibrillation    History is obtained from the patient and chart review.    History of Present Illness    Ms. Melissa Alfaro is a very pleasant 85 year old female with a history of COPD, anxiety, irritable bowel syndrome, hypertension, hyperlipidemia, carotid artery stenosis, chronic diastolic dysfunction, and atrial fibrillation who was admitted on 8/9/2019 for abdominal pain and nausea. She was seen by the general surgery team and an exploratory laparotomy, lysis of adhesions, and small bowel resection on 8/7/2019 for a small bowel obstruction. I was asked to see the patient for paroxsymal atrial fibrillation post operatively. She has been NPO following the surgery with an NG tube in place. She has had some acute kidney injury, which has now improved with her most recent creatinine today at 0.79. She has been hypernatremic and hypokalemic and receiving supplemental potassium.     She recently moved to [facility] from [place] where she has previously received most of her care. She denies being on anticoagulation in the past, but does recall discussing this with providers. Her history also notes a ventricular septal defect and pulmonary hypertension, but there is no evidence of either on her last echocardiogram in 4/17/2018, which showed normal biventricular size and function. Ejection fraction was 65 to 70%. Left atrium was severely dilated with the volume index of 49 mL/m . There was no mention of VSD. There was mild mitral regurgitation and normal pulmonary pressures.     She hasn't been able to take her oral medications so she has been getting IV metoprolol 5 mg every 6 hours and an additional 5 mg as needed for a heart  rate over 120 bpm. A diltiazem drip was also started due to a period of rapid ventricular rates despite metoprolol last night. She is now clinically in sinus rhythm. Ms. Alfaro currently denies palpitations, chest pain, shortness of breath, lightheadedness, or dizziness. She denies having a sensation of heart racing or palpitations in the past with [email or web address].     Past Medical History   I have reviewed this patient's medical history and updated it with pertinent information if needed.   Past Medical History:   Diagnosis Date     Anxiety      Benign neoplasm of colon      Carotid stenosis, asymptomatic      COPD (chronic obstructive pulmonary disease) (H)      Corns and callosities      Dermatophytosis of nail      Essential hypertension      History of atrial fibrillation      Hyperlipidemia      Hyperlipidemia      Insomnia      Irritable bowel syndrome      Nocturia      Plantar fascial fibromatosis      Primary localized osteoarthrosis of the hip      Pulmonary hypertension (H)      Sprain of lumbar region      Vitamin D deficiency      VSD (ventricular septal defect and aortic arch hypoplasia        Past Surgical History   Past surgical history reviewed.    Prior to Admission Medications   Prior to Admission Medications   Prescriptions Last Dose Informant Patient Reported? Taking?   ACETAMINOPHEN PO 8/4/2019 at AM Daughter Yes Yes   Sig: Take 325-650 mg by mouth every 6 hours as needed for pain   albuterol (PROAIR HFA/PROVENTIL HFA/VENTOLIN HFA) 108 (90 Base) MCG/ACT Inhaler  at PRN Daughter Yes Yes   Sig: Inhale 1-2 puffs into the lungs every 4 hours as needed for shortness of breath / dyspnea or wheezing   amLODIPine (NORVASC) 10 MG tablet 8/4/2019 at AM Daughter Yes Yes   Sig: Take 10 mg by mouth daily   arformoterol (BROVANA) 15 MCG/2ML NEBU neb solution 8/3/2019 at PM Daughter Yes Yes   Sig: Take 15 mcg by nebulization 2 times daily   atenolol (TENORMIN) 50 MG tablet 8/4/2019 at AM Daughter Yes Yes   Sig: Take  50 mg by mouth daily   budesonide (PULMICORT) 0.5 MG/2ML neb solution 8/3/2019 at PM Daughter Yes Yes   Sig: Take 0.5 mg by nebulization 2 times daily   glycerin-hypromellose- (ARTIFICIAL TEARS) 0.2-0.2-1 % SOLN ophthalmic solution 8/3/2019 at Unknown time Daughter Yes Yes   Sig: Place 1 drop into both eyes 4 times daily as needed    tiotropium (SPIRIVA) 18 MCG capsule 8/3/2019 at PM Daughter Yes Yes   Sig: Inhale 18 mcg into the lungs daily    vitamin D3 (CHOLECALCIFEROL) 15468 units capsule 7/29/2019 Daughter Yes Yes   Sig: Take 50,000 Units by mouth once a week On Monday.      Facility-Administered Medications: None     Allergies   Allergies   Allergen Reactions     No Clinical Screening - See Comments Shortness Of Breath     With some mold spores  Other reaction(s): Other  Heart races with antihistamines     Diphtheria-Tetanus Toxoids      Other reaction(s): Other  Red swollen arm     Epinephrine      Other reaction(s): Other  Heart rate increases & her body shakes & trembles     Penicillin G      Other reaction(s): Other (Specify in Comments)       Social History   I have reviewed this patient's social history and updated it with pertinent information if needed. Melissa Alfaro      Patient recently moved from [place], [place] to the [facility] to live with her daughter. Most of her previous care was through [facility].    Family History   I have reviewed this patient's family history and updated it with pertinent information if needed.   Family History   Problem Relation Age of Onset     Chronic Obstructive Pulmonary Disease Mother      Cerebrovascular Disease Mother      Cancer Father      Heart Disease Father      Seizure Disorder Father      Osteoporosis Sister      Lung Cancer Brother        Review of Systems   The 10 point Review of Systems is negative other than noted in the HPI.    Physical Exam   Temp: 97.9  F (36.6  C) Temp src: Axillary BP: (!) 149/58 Pulse: 85 Heart Rate: 85 Resp: 15 SpO2: 100  % O2 Device: Nasal cannula Oxygen Delivery: 2 LPM  Vital Signs with Ranges  Temp:  [97.4  F (36.3  C)-98  F (36.7  C)] 97.9  F (36.6  C)  Pulse:  [] 85  Heart Rate:  [] 85  Resp:  [12-28] 15  BP: (104-150)/(53-99) 149/58  SpO2:  [88 %-100 %] 100 %  98 lbs 0 oz    Constitutional: Appears comfortable, sitting up in bed, and in no apparent distress.  Eyes: Pupils equal, round. Sclerae anicteric.   HEENT: Normocephalic, atraumatic.   Neck: Supple. No JVD appreciated.  Respiratory: Breathing non-labored. Lungs clear to auscultation bilaterally. No crackles, wheezes, rhonchi, or rales.  Cardiovascular: Regular rate and rhythm, normal S1 and S2. No murmur, rub, or gallop.  Skin: Warm, dry. No rashes, cyanosis, edema, or xanthelasma.  Musculoskeletal/Extremities: Moves all extremities well and symmetrically. No edema.  Neurologic: Alert, awake. No gross motor deficits.   Psychiatric: Affect appropriate.      Data    Results for orders placed or performed during the hospital encounter of 08/04/19 (from the past 24 hour(s))   Basic metabolic panel   Result Value Ref Range    Sodium 155 (H) 133 - 144 mmol/L    Potassium 3.5 3.4 - 5.3 mmol/L    Chloride 119 (H) 94 - 109 mmol/L    Carbon Dioxide 28 20 - 32 mmol/L    Anion Gap 8 3 - 14 mmol/L    Glucose 121 (H) 70 - 99 mg/dL    Urea Nitrogen 29 7 - 30 mg/dL    Creatinine 0.92 0.52 - 1.04 mg/dL    GFR Estimate 56 (L) >60 mL/min/[1.73_m2]    GFR Estimate If Black 65 >60 mL/min/[1.73_m2]    Calcium 8.9 8.5 - 10.1 mg/dL   Lactic acid level STAT for sepsis protocol   Result Value Ref Range    Lactate for Sepsis Protocol 1.8 0.7 - 2.0 mmol/L   EKG 12-lead, tracing only   Result Value Ref Range    Interpretation ECG Click View Image link to view waveform and result    Glucose by meter   Result Value Ref Range    Glucose 142 (H) 70 - 99 mg/dL   Basic metabolic panel   Result Value Ref Range    Sodium 153 (H) 133 - 144 mmol/L    Potassium 2.8 (L) 3.4 - 5.3 mmol/L    Chloride  110 (H) 94 - 109 mmol/L    Carbon Dioxide 38 (H) 20 - 32 mmol/L    Anion Gap 5 3 - 14 mmol/L    Glucose 151 (H) 70 - 99 mg/dL    Urea Nitrogen 21 7 - 30 mg/dL    Creatinine 0.79 0.52 - 1.04 mg/dL    GFR Estimate 68 >60 mL/min/[1.73_m2]    GFR Estimate If Black 78 >60 mL/min/[1.73_m2]    Calcium 8.2 (L) 8.5 - 10.1 mg/dL

## 2019-08-09 NOTE — PLAN OF CARE
2223-5245: VSS on 2L NC. Tele SR with PACs- cardizem gtt stopped (instructed by cardiology PA at bedside). Started replacing potassium, oncoming nurse continuing replacement. Doran out, due to void.

## 2019-08-09 NOTE — CONSULTS
SW Consult Note:    Care Transition Initial Assessment - SW     Met with: Patient and two sons.   Active Problems:    Small bowel obstruction (H)       DATA  Lives With: child(marycarmen), adult   Living Arrangements: house  Quality of Family Relationships: helpful, involved, supportive  Description of Support System: Supportive, Involved  Who is your support system?: Children  Support Assessment: Adequate family and caregiver support, Adequate social supports.   Identified issues/concerns regarding health management: Per social service protocol for discharge planning, patient was admitted on 8/4/19 with a primary diagnosis of small bowel obstruction.  Reviewed chart and spoke with patient and her two sons regarding discharge planning.  Patient stated that she has been living at home with her daughter. Discussed discharge recommendations of TCU placement and patient and sons stated that she was at [facility] last year and would like to have a referral sent there again.  SW sent referral via [facility].            Quality of Family Relationships: helpful, involved, supportive  Transportation Anticipated: family or friend will provide    ASSESSMENT  Cognitive Status:  awake, alert and oriented  Concerns to be addressed: Discharge planning.     PLAN  Financial costs for the patient includes: BCBS Medicare Advantage.  Patient given options and choices for discharge: Offered TCU list for patient.   Patient/family is agreeable to the plan?  Yes  Transportation/person available to transport on day of discharge: Family/Transport Aide  Patient Goals and Preferences: [facility]  Patient anticipates discharging to:  U    AN Padilla, DESMONDSW

## 2019-08-09 NOTE — PROGRESS NOTES
CLINICAL NUTRITION SERVICES - REASSESSMENT NOTE      Future/Additional Recommendations:     If unable to advance diet in the next 2-3 days, recommend consider alternate means of nutrition     Malnutrition: (8/5)  % Weight Loss:  > 2% in 1 week (severe malnutrition)  % Intake:  Decreased intake does not meet criteria for malnutrition (has been 4 days)  Subcutaneous Fat Loss:  None observed  Muscle Loss:  Clavicle bone region  - mild depletion and Acromion bone region  - mild depletion  Fluid Retention:  None noted     Malnutrition Diagnosis: Non-Severe malnutrition  In Context of:  Acute illness or injury         EVALUATION OF PROGRESS TOWARD GOALS   Diet:  NPO (day 6)    NG to LIS  BM x1 last evening  IVF (D5) at 75 mL/hr  (306 cals)      NEW FINDINGS:   8/7:  EXPLORATORY LAPAROTOMY, LYSIS OF ADHESION, SMALL BOWEL RESECTION  8/9: some confusion         Afib overnight      Previous Goals (8/5):   Pt to receive nutrition in the next 2-3 days  Evaluation: Not met    Previous Nutrition Diagnosis (8/5):   Inadequate protein-energy intake related to NPO status with SBO as evidenced by pt not meeting est nutrition needs  Evaluation: No change        CURRENT NUTRITION DIAGNOSIS  Inadequate protein-energy intake related to day 6 NPO status as evidenced by pt not meeting est nutrition needs    INTERVENTIONS  Recommendations / Nutrition Prescription  NPO (diet per MD)    If unable to advance diet in the next 2-3 days, recommend consider alternate means of nutrition    Implementation  No intervention at this time    Goals  Pt to receive nutrition in the next 2-3 days      MONITORING AND EVALUATION:  Progress towards goals will be monitored and evaluated per protocol and Practice Guidelines

## 2019-08-09 NOTE — PLAN OF CARE
Confused. VSS on 1-2/l NC. Tele: SR/ST with PACs (converted from Afib RVR around 0030), Cardizem drip infusing at 10mg/hr. Can be dyspneic on exertion and with anxiety. NG to LIS. +BS/BM/flatus. Adequate urinary output to wiley, to be removed in AM then due to void. Abdominal dressing CDI. Dilaudid given x1 for pain. pleasant and cooperative most of shift but did become agitated and restless when placed on IMC monitors.

## 2019-08-09 NOTE — PLAN OF CARE
Discharge Planner PT   Patient plan for discharge: Return to daughter's house before moving into what Pt reports sounds like an [facility]ACQU[facility]INE  Current status: Pt lying in bed sleeping upon therapist entrance but agreeable to session. Pt required min Ax1 to come to sitting EOB to help lift upper body. Able to independently scoot closer to the edge to get her feet on the floor. Required SBA for sit <> stand due to unsteadiness once on her feet. Pt ambulated 150' in hallway using FWW and CGA provided for balance. Pt cued to increase her TIFFANIE and to look up when she walks. She responded well to cueing. Pt returned to sitting up in chair at end of session and was able to walk backward 10' with FWW to chair with only SBA for balance. Cued to reach back to chair when sitting.  Barriers to return to prior living situation: Level of assist, Decreased activity tolerance, Use of O2, Fall risk  Recommendations for discharge: TCU  Rationale for recommendations: Pt is below baseline in regards to independent functional mobility and would benefit from continued therapy in order to increase activity tolerance and improve balance.       Entered by: Tish Barreto 08/09/2019 12:02 PM

## 2019-08-09 NOTE — PROVIDER NOTIFICATION
Hospitalist notified of pt's tachycardia in the 140s-150s that did not respond to scheduled or prn IV metoprolol. Tele looks like Afib, pt asymptomatic and other VSS    EKG orders placed and writer to update MD.

## 2019-08-09 NOTE — PROGRESS NOTES
[facility]    General Surgery  Daily Post-Op Note       Assessment and Plan:   Melissa Alfaro is a 85 year old female S/P Procedure(s):  EXPLORATORY LAPAROTOMY, LYSIS OF ADHESION, SMALL BOWEL RESECTION, 2 Days Post-Op. Overnight events reviewed.  Afib with RVR, appreciate medicine and cards.    Pain: minimal   Bowel: + gas, small BM last night  NG: clamp for 4 hrs, if no nausea, may remove  Diet: start clears slowly if NG removed  IVFs: D5 at 75 mL/hr  Activity: encourage ambulation 3-4x daily  Antibiotics: continue Invanz  DVT prophylaxis: on heparin SQ   Dispo: several days depending on clinical course        Interval History:   Overnight events reviewed.  Afib with RVR, appreciate medicine and cards. Pt sitting up in chair.  Visiting with family and friends.  Denies pain.  Doesn't remember she had surgery.  Passing a little gas, had a small BM last night.  Denies nausea, not burping, doesn't feel hungry.            Physical Exam:   Temp: 98  F (36.7  C) Temp src: Axillary BP: 125/64 Pulse: 98 Heart Rate: 104 Resp: (P) 24 SpO2: 96 % O2 Device: (P) Nasal cannula Oxygen Delivery: (P) 2 LPM    I/O last 3 completed shifts:  In: 1673.75 [P.O.:640; I.V.:1033.75]  Out: 2550 [Urine:500; Emesis/NG output:2050]      Constitutional: alert and no distress   Abdomen: Abdomen soft, non-tender. Examined while sitting up.  Mild tympanic to percussion. Incision - dressing removed, staples in place - clean/dry/intact       Data   Recent Labs   Lab 08/09/19  0612 08/08/19  1423 08/08/19  0749 08/07/19  1200 08/07/19  0850 08/06/19  0649  08/04/19  1151   WBC  --   --  13.5*  --  20.4* 24.8*   < > 28.0*   HGB  --   --  9.3*  --  10.0* 9.7*   < > 12.3   MCV  --   --  90  --  90 90   < > 89   PLT  --   --  280 299 349 264   < > 377   * 155* 153*  --  147* 141   < > 137   POTASSIUM 2.8* 3.5 3.0*  --  3.7 4.0   < > 4.4   CHLORIDE 110* 119* 116*  --  115* 112*   < > 101   CO2 38* 28 28  --  24 22   < > 25   BUN 21  29 27  --  35* 40*   < > 33*   CR 0.79 0.92 0.96  --  1.13* 1.24*   < > 1.28*   ANIONGAP 5 8 9  --  8 7   < > 11   LUIS ENRIQUE 8.2* 8.9 8.5  --  9.5 8.4*   < > 9.6   * 121* 112*  --  133* 127*   < > 199*   ALBUMIN  --   --   --   --   --   --   --  3.8   PROTTOTAL  --   --   --   --   --   --   --  7.6   BILITOTAL  --   --   --   --   --   --   --  0.6   ALKPHOS  --   --   --   --   --   --   --  58   ALT  --   --   --   --   --   --   --  21   AST  --   --   --   --   --   --   --  22    < > = values in this interval not displayed.       Jocelyn Daniel PA-C

## 2019-08-09 NOTE — PROGRESS NOTES
[facility] Progress Note  [facility]     visited due to LOS. Pt was awake in bed.    Pt said this hospitalization has been more complex and has taken longer than she expected.  Pt said she was anticipating the arrival of visitors who had traveled from out of town to see her.     introduced SH services.    SH available as needed.     Jigar Krishnamurthy   Intern  Pager: [phone]

## 2019-08-09 NOTE — PROGRESS NOTES
[facility]    Increase metoprolol  Aggressive potassium replacement,  Add magnesium  Consider diltiazem drip    Hospitalist Progress Note    Assessment & Plan   Melissa Alfaro is a 85 year old female with PMHx of hypertension, hyperlipidemia, paroxysmal afib (not on chronic anticoagulation), COPD, pulmonary hypertension, COPD, carotid stenosis, IBS, insomonia and anxiety among other medical problems who was admitted on 8/4/2019 with abdominal pain secondary to SBO vs ileus.    Patient recently moved from [place], [place] to the [facility] to live with her daughter. Most of her previous care was through [facility].    SBO, s/p exp lap with lysis of adhesions and resection of small bowel on 8/7/19   Presented with complaints of abdominal pain that began the morning of admission. Some nausea but no vomiting. No diarrhea. In ED was afebrile and hemodynamically stable. WBC 28. LFTs, lactate and lipase all nl. CT abd/pelvis obtained and showed multiple dilated loops of small bowel in the lower abd with no discrete transition point -- suggestive of partial SBP vs ileus and mild to mod intraabdominal and pelvis ascites with mild wall thickening of the ascending colon and several loops of small bowel (suggestive of enteritis vs colitis). NGT placed. Initiated on bowel rest and supportive cares with IVFs, antiemetics, pain meds and antibiotics.     General surgery followed closely. Had ongoing large output per NG, abd pain and persistent leukocytosis. Serial lactates nl. Had repeat CT on 8/7 which showed persistent SBO with contrast (given on 8/6) in small bowel only. Taken for emergent exp lap per Dr. Valente. Underwent exploratory laparotomy with lysis of adhesions and partial small bowel resection. Intraop findings were notable for an adhesive band around the small bowel prompting resection of a section of nonviable small bowel, also noted to have 600ml bloody ascites.  -- routine postop cares per  general surgery including pain control and DVT ppx  -- abx changed from cipro/flagyl to ertapenem postop  -- remains NPO, surgery trialing clamping per the chart review  -- cont other supportive cares as below    Paroxysmal Afib  Difficult to treat PAfib during hospital stay  Normally the patient is controlled with atenolol and with cessation of orals this was stopped  She has needed IV metoprolol and then diltiazem gtt  plan  - Cardiology consulted appreciated  - continue the metoprolol for now, atenolol when tolerating oral agents  - follow-up with cardiology as outpatient to discuss OAC  - on potassium protocol  - can discharge [facility] status    SANDRA: Improved  hypernatremia  Baseline Cr seems to be around 0.9 - 1.1. No reported hx of CKD. Cr 1.28 on presentation.   Cr initially trended up despite IVFs, peaked at 1.68 on 8/5. At that time, urine output was noted to be low per RN so was given an additional 500ml fluid bolus  -- Cr improved  - D5 for hypernatremia. Stop and consider diuretics if increasing O2 use.     Possible Pyelonephritis  Noted on CT to have horseshoe shaped kidneys and some perinephric stranding suggestive of pyelonephritis. UA on admission showed lg leuk est with 21 WBCs, 3 RBCs and few squam cells. Per RN, urine output initially low -- as she had worsening renal function on 8/5, she was given an additional 500ml fluid bolus this morning and cont IVFs with NS @100ml/h (will need to monitor respiratory status given hx of severe diastolic dysfunction)  -- place on IV Cipro on admission dt PCN allergy (causes hives/edema), changed to ertapenem postop  -- urine culture drawn on admission grew 50-100k strep agalactiae (sensitive to PCNs, Vanco and cephalosporins) -- unclear how much these findings have been contributing to current condition given above  -- blood culture drawn on admission remains negative    Hypertension  Hyperlipidemia  Chronic diastolic dysfunction (reported as severe on last echo in  4/2018)  Last echo in 4/2018 showed probable severe diastolic dysfunction of the left ventricle, EF 65-70%, mod to severe LAE and mild MR; RVSF nl.  PTA meds: atenolol 50mg daily, amlodipine 10mg daily. Not on chronic anticoagulation, aspirin or statin  CT abdomen revealed some bilateral pleural effusion on 8/6  -- atenolol and amlodipine continued on admission (BPs stable)  -- requiring IVFs this stay dt above -- need to monitor closely for potential volume overload   -- CXR not concerning for volume overload  -- suspect mild degree of hypoxemia is related to underlying COPD    COPD  Chronic and stable on home inhalers, including aformoterol, budesonide, tiotropium and prn albuterol. No s/sx of exacerbation  Nurse reports 2 liters of NC overnight and sometimes during the day, so may be some component of why she has some mild desaturations  -- monitor respiratory status  - no wheezing, no indication for steroids at prsent  - O2 prn    FEN: cont NS at 50ml/h, lytes stable, diet per surgery -- NPO for now  DVT Prophylaxis: PCDs, subQ heparin  Code Status: Full Code     Disposition: Discharge date unclear, pending postop course and stable respiratory status -- suspect 2-3 days, and then TCU    Family at bedside and in agreement with care plan.    Corey Putnam DO    Interval History   Working with PT  Had diltiazem infusion for the afib with RVR  Clamping trial    -Data reviewed today: I reviewed all new labs and imaging results over the last 24 hours. I personally reviewed no images or EKG's today.    Physical Exam   Temp: 98.3  F (36.8  C) Temp src: Axillary BP: 139/63 Pulse: 98 Heart Rate: 79 Resp: 24 SpO2: 96 % O2 Device: Nasal cannula Oxygen Delivery: 1 LPM  Vitals:    08/07/19 0615 08/08/19 0534 08/09/19 0800   Weight: 44.6 kg (98 lb 5.2 oz) 45.9 kg (101 lb 3.1 oz) 44.5 kg (98 lb)     Vital Signs with Ranges  Temp:  [97.4  F (36.3  C)-98.4  F (36.9  C)] 98.3  F (36.8  C)  Pulse:  [] 98  Heart Rate:   [] 79  Resp:  [12-28] 24  BP: (104-150)/(53-99) 139/63  SpO2:  [87 %-100 %] 96 %  I/O last 3 completed shifts:  In: 1513.75 [P.O.:480; I.V.:1033.75]  Out: 3060 [Urine:1110; Emesis/NG output:1950]    Constitutional: Resting quietly in bed, alert, answering questions appropriately, NAD  Respiratory: BS diminished at bilateral lung bases with faint bibasilar crackles, no wheeze, no increased work of breathing  Cardiovascular: HRRR, no MGR, no LE edema  GI: appropriately TTP  Skin/Integumen: warm/dry  Other: +NG tube in place    Medications     D5W 75 mL/hr at 08/09/19 0445       arformoterol  15 mcg Nebulization BID     budesonide  0.5 mg Nebulization BID     ertapenem (INVanz) IV  1 g Intravenous Q24H     heparin  5,000 Units Subcutaneous Q12H     metoprolol  5 mg Intravenous Q6H     sodium chloride (PF)  3 mL Intracatheter Q8H     umeclidinium  1 puff Inhalation Daily       Data   Recent Labs   Lab 08/09/19  0612 08/08/19  1423 08/08/19  0749 08/07/19  1200 08/07/19  0850 08/06/19  0649  08/04/19  1151   WBC  --   --  13.5*  --  20.4* 24.8*   < > 28.0*   HGB  --   --  9.3*  --  10.0* 9.7*   < > 12.3   MCV  --   --  90  --  90 90   < > 89   PLT  --   --  280 299 349 264   < > 377   * 155* 153*  --  147* 141   < > 137   POTASSIUM 2.8* 3.5 3.0*  --  3.7 4.0   < > 4.4   CHLORIDE 110* 119* 116*  --  115* 112*   < > 101   CO2 38* 28 28  --  24 22   < > 25   BUN 21 29 27  --  35* 40*   < > 33*   CR 0.79 0.92 0.96  --  1.13* 1.24*   < > 1.28*   ANIONGAP 5 8 9  --  8 7   < > 11   LUIS ENRIQUE 8.2* 8.9 8.5  --  9.5 8.4*   < > 9.6   * 121* 112*  --  133* 127*   < > 199*   ALBUMIN  --   --   --   --   --   --   --  3.8   PROTTOTAL  --   --   --   --   --   --   --  7.6   BILITOTAL  --   --   --   --   --   --   --  0.6   ALKPHOS  --   --   --   --   --   --   --  58   ALT  --   --   --   --   --   --   --  21   AST  --   --   --   --   --   --   --  22   LIPASE  --   --   --   --   --   --   --  131    < > = values in  this interval not displayed.       Recent Results (from the past 24 hour(s))   XR Chest 2 Views    Narrative    CHEST TWO VIEWS  8/9/2019 10:00 AM     HISTORY:  Hypoxemia.    COMPARISON: None.    FINDINGS: Small bilateral pleural effusions. The lungs are  hyperexpanded. No pneumothorax or airspace consolidation. Heart size  normal. A nasogastric tube extends into the stomach.       Impression    IMPRESSION: No radiographic evidence of acute chest abnormality.     ROE BURNHAM MD

## 2019-08-09 NOTE — PLAN OF CARE
Discharge Planner OT   Patient plan for discharge: none stated   Current status: Pt transferred to/from the toilet with CGA. While seated/standing pt completed LE dressing with CGA. Pt required minimum A during clothing retrieval.   Barriers to return to prior living situation:  level of A, decreased strength, impaired cognition  Recommendations for discharge: TCU  Rationale for recommendations: Skilled OT to address independence in I/ADLs, as pt is below baseline in I/ADLs.        Entered by: Meng Mi 08/09/2019 4:26 PM

## 2019-08-10 ENCOUNTER — APPOINTMENT (OUTPATIENT)
Dept: OCCUPATIONAL THERAPY | Facility: CLINIC | Age: 84
DRG: 330 | End: 2019-08-10
Payer: COMMERCIAL

## 2019-08-10 LAB
ANION GAP SERPL CALCULATED.3IONS-SCNC: 4 MMOL/L (ref 3–14)
BACTERIA SPEC CULT: ABNORMAL
BACTERIA SPEC CULT: ABNORMAL
BACTERIA SPEC CULT: NO GROWTH
BUN SERPL-MCNC: 15 MG/DL (ref 7–30)
CALCIUM SERPL-MCNC: 8.2 MG/DL (ref 8.5–10.1)
CHLORIDE SERPL-SCNC: 105 MMOL/L (ref 94–109)
CO2 SERPL-SCNC: 36 MMOL/L (ref 20–32)
CREAT SERPL-MCNC: 0.84 MG/DL (ref 0.52–1.04)
ERYTHROCYTE [DISTWIDTH] IN BLOOD BY AUTOMATED COUNT: 14.7 % (ref 10–15)
ERYTHROCYTE [DISTWIDTH] IN BLOOD BY AUTOMATED COUNT: 14.9 % (ref 10–15)
GFR SERPL CREATININE-BSD FRML MDRD: 63 ML/MIN/{1.73_M2}
GLUCOSE BLDC GLUCOMTR-MCNC: 99 MG/DL (ref 70–99)
GLUCOSE SERPL-MCNC: 115 MG/DL (ref 70–99)
HCT VFR BLD AUTO: 28.8 % (ref 35–47)
HCT VFR BLD AUTO: 29.3 % (ref 35–47)
HGB BLD-MCNC: 9.2 G/DL (ref 11.7–15.7)
HGB BLD-MCNC: 9.4 G/DL (ref 11.7–15.7)
Lab: ABNORMAL
Lab: NORMAL
MCH RBC QN AUTO: 28.9 PG (ref 26.5–33)
MCH RBC QN AUTO: 29 PG (ref 26.5–33)
MCHC RBC AUTO-ENTMCNC: 31.9 G/DL (ref 31.5–36.5)
MCHC RBC AUTO-ENTMCNC: 32.1 G/DL (ref 31.5–36.5)
MCV RBC AUTO: 90 FL (ref 78–100)
MCV RBC AUTO: 91 FL (ref 78–100)
PLATELET # BLD AUTO: 308 10E9/L (ref 150–450)
PLATELET # BLD AUTO: 319 10E9/L (ref 150–450)
POTASSIUM SERPL-SCNC: 2.9 MMOL/L (ref 3.4–5.3)
POTASSIUM SERPL-SCNC: 3.6 MMOL/L (ref 3.4–5.3)
RBC # BLD AUTO: 3.17 10E12/L (ref 3.8–5.2)
RBC # BLD AUTO: 3.25 10E12/L (ref 3.8–5.2)
SODIUM SERPL-SCNC: 145 MMOL/L (ref 133–144)
SPECIMEN SOURCE: ABNORMAL
SPECIMEN SOURCE: NORMAL
WBC # BLD AUTO: 7.9 10E9/L (ref 4–11)
WBC # BLD AUTO: 8.8 10E9/L (ref 4–11)

## 2019-08-10 PROCEDURE — 94640 AIRWAY INHALATION TREATMENT: CPT | Mod: 76

## 2019-08-10 PROCEDURE — 25000128 H RX IP 250 OP 636: Performed by: PHYSICIAN ASSISTANT

## 2019-08-10 PROCEDURE — 36415 COLL VENOUS BLD VENIPUNCTURE: CPT | Performed by: SURGERY

## 2019-08-10 PROCEDURE — 85027 COMPLETE CBC AUTOMATED: CPT | Performed by: HOSPITALIST

## 2019-08-10 PROCEDURE — 97535 SELF CARE MNGMENT TRAINING: CPT | Mod: GO | Performed by: REHABILITATION PRACTITIONER

## 2019-08-10 PROCEDURE — 36415 COLL VENOUS BLD VENIPUNCTURE: CPT | Performed by: HOSPITALIST

## 2019-08-10 PROCEDURE — 25000125 ZZHC RX 250: Performed by: HOSPITALIST

## 2019-08-10 PROCEDURE — 25800030 ZZH RX IP 258 OP 636: Performed by: HOSPITALIST

## 2019-08-10 PROCEDURE — 40000275 ZZH STATISTIC RCP TIME EA 10 MIN

## 2019-08-10 PROCEDURE — 36415 COLL VENOUS BLD VENIPUNCTURE: CPT | Performed by: INTERNAL MEDICINE

## 2019-08-10 PROCEDURE — 12000000 ZZH R&B MED SURG/OB

## 2019-08-10 PROCEDURE — 00000146 ZZHCL STATISTIC GLUCOSE BY METER IP

## 2019-08-10 PROCEDURE — 25800030 ZZH RX IP 258 OP 636: Performed by: INTERNAL MEDICINE

## 2019-08-10 PROCEDURE — 84132 ASSAY OF SERUM POTASSIUM: CPT | Performed by: HOSPITALIST

## 2019-08-10 PROCEDURE — 85027 COMPLETE CBC AUTOMATED: CPT | Performed by: INTERNAL MEDICINE

## 2019-08-10 PROCEDURE — 99232 SBSQ HOSP IP/OBS MODERATE 35: CPT | Performed by: INTERNAL MEDICINE

## 2019-08-10 PROCEDURE — 80048 BASIC METABOLIC PNL TOTAL CA: CPT | Performed by: HOSPITALIST

## 2019-08-10 PROCEDURE — 25000125 ZZHC RX 250: Performed by: PHYSICIAN ASSISTANT

## 2019-08-10 PROCEDURE — 84132 ASSAY OF SERUM POTASSIUM: CPT | Performed by: SURGERY

## 2019-08-10 RX ADMIN — UMECLIDINIUM 1 PUFF: 62.5 AEROSOL, POWDER ORAL at 18:14

## 2019-08-10 RX ADMIN — BUDESONIDE 0.5 MG: 0.5 INHALANT RESPIRATORY (INHALATION) at 08:04

## 2019-08-10 RX ADMIN — Medication 10 MEQ: at 07:38

## 2019-08-10 RX ADMIN — DEXTROSE MONOHYDRATE: 50 INJECTION, SOLUTION INTRAVENOUS at 06:22

## 2019-08-10 RX ADMIN — METOPROLOL TARTRATE 5 MG: 5 INJECTION INTRAVENOUS at 21:22

## 2019-08-10 RX ADMIN — METOPROLOL TARTRATE 5 MG: 5 INJECTION INTRAVENOUS at 04:34

## 2019-08-10 RX ADMIN — Medication 10 MEQ: at 06:21

## 2019-08-10 RX ADMIN — Medication 10 MEQ: at 05:10

## 2019-08-10 RX ADMIN — ARFORMOTEROL TARTRATE 15 MCG: 15 SOLUTION RESPIRATORY (INHALATION) at 08:05

## 2019-08-10 RX ADMIN — ERTAPENEM SODIUM 1 G: 1 INJECTION, POWDER, LYOPHILIZED, FOR SOLUTION INTRAMUSCULAR; INTRAVENOUS at 08:15

## 2019-08-10 RX ADMIN — METOPROLOL TARTRATE 5 MG: 5 INJECTION INTRAVENOUS at 08:21

## 2019-08-10 RX ADMIN — HEPARIN SODIUM 5000 UNITS: 5000 INJECTION, SOLUTION INTRAVENOUS; SUBCUTANEOUS at 13:39

## 2019-08-10 RX ADMIN — Medication 10 MEQ: at 09:05

## 2019-08-10 RX ADMIN — DEXTROSE MONOHYDRATE: 50 INJECTION, SOLUTION INTRAVENOUS at 20:02

## 2019-08-10 RX ADMIN — ARFORMOTEROL TARTRATE 15 MCG: 15 SOLUTION RESPIRATORY (INHALATION) at 18:52

## 2019-08-10 RX ADMIN — HYDROMORPHONE HYDROCHLORIDE 0.5 MG: 1 INJECTION, SOLUTION INTRAMUSCULAR; INTRAVENOUS; SUBCUTANEOUS at 04:34

## 2019-08-10 RX ADMIN — METOPROLOL TARTRATE 5 MG: 5 INJECTION INTRAVENOUS at 15:40

## 2019-08-10 RX ADMIN — BUDESONIDE 0.5 MG: 0.5 INHALANT RESPIRATORY (INHALATION) at 18:52

## 2019-08-10 RX ADMIN — Medication 10 MEQ: at 11:00

## 2019-08-10 ASSESSMENT — ACTIVITIES OF DAILY LIVING (ADL)
ADLS_ACUITY_SCORE: 17

## 2019-08-10 ASSESSMENT — MIFFLIN-ST. JEOR: SCORE: 841.42

## 2019-08-10 NOTE — PLAN OF CARE
Discharge Planner OT   Patient plan for discharge: Pt plans to go to [facility]   Current status: Pt transferred to/from the toilet with CGA. Pt needs step by step cueing for walker safety and abdominal precautions in all functional mobility. Pt reports increased fatigue and weakness today.  Barriers to return to prior living situation: Level of A, decreased strength, impaired cognition  Recommendations for discharge: TCU  Rationale for recommendations: Skilled OT to address independence in I/ADLs, as pt is below baseline in I/ADLs.        Entered by: Lorena Crespo 08/10/2019 9:46 AM

## 2019-08-10 NOTE — PROGRESS NOTES
[facility]  Hospitalist Progress Note        Ruth Hernandez MD  08/10/2019        Interval History:      She is mildly confused.  Pain is better  She pulled her NG yesterday  Tolerating sips of water  No nausea or vomiting.  No chest pain or sob   Passing some gas         Assessment and Plan:          85 year old female with PMHx of hypertension, hyperlipidemia, paroxysmal afib (not on chronic anticoagulation), COPD, pulmonary hypertension, COPD, carotid stenosis, IBS, insomonia and anxiety among other medical problems who was admitted on 8/4/2019 with abdominal pain secondary to SBO vs ileus.     Patient recently moved from [place], [place] to [facility] to live with her daughter. Most of her previous care was through [facility].     SBO, s/p exploratory lap with lysis of adhesions and resection of small bowel on 8/7/19   Presented with complaints of abdominal pain that began the morning of admission. Some nausea but no vomiting. No diarrhea. In ED was afebrile and hemodynamically stable. WBC 28. LFTs, lactate and lipase all nl. CT abd/pelvis obtained and showed multiple dilated loops of small bowel in the lower abd with no discrete transition point -- suggestive of partial SBP vs ileus and mild to mod intraabdominal and pelvis ascites with mild wall thickening of the ascending colon and several loops of small bowel (suggestive of enteritis vs colitis). NGT placed. Initiated on bowel rest and supportive cares with IVFs, antiemetics, pain meds and antibiotics.    General surgery followed closely. Had ongoing large output per NG, abd pain and persistent leukocytosis. Serial lactates nl. Had repeat CT on 8/7 which showed persistent SBO with contrast (given on 8/6) in small bowel only. Taken for emergent exp lap per Dr. Valente. Underwent exploratory laparotomy with lysis of adhesions and partial small bowel resection. Intraop findings were notable for an adhesive band around the small bowel  prompting resection of a section of nonviable small bowel, also noted to have 600ml bloody ascites.  -- routine postop cares per general surgery including pain control and DVT ppx  -- abx changed from cipro/flagyl to ertapenem postop  -patient pulled out her NG tube  -surgery allowed patient to have sips of water    Paroxysmal Afib  Difficult to treat PAfib during hospital stay  Normally the patient is controlled with atenolol and with cessation of orals this was stopped  She needed IV metoprolol and then diltiazem drip  Currently on metoprolol IV  Cardiology consult appreciated continue metoprolol  And start atenolol when she tolerates oral agent  She will need follow-up with cardiology as outpatient to discuss anticoagulation  Patient's potassium is low and she is on potassium replacement protocol  [facility] status was discontinued yesterday    SANDRA: Improved  hypernatremia  Baseline Cr seems to be around 0.9 - 1.1. No reported hx of CKD. Cr 1.28 on presentation.   Cr initially trended up despite IVFs, peaked at 1.68 on 8/5. At that time, urine output was noted to be low per RN so was given an additional 500ml fluid bolus  -- Cr improved  - D5 for hypernatremia. Stop and consider diuretics if increasing O2 use.   Sodium is improving but is still on higher side     Possible Pyelonephritis  Noted on CT to have horseshoe shaped kidneys and some perinephric stranding suggestive of pyelonephritis. UA on admission showed lg leuk est with 21 WBCs, 3 RBCs and few squam cells. Per RN, urine output initially low -- as she had worsening renal function on 8/5, she was given an additional 500ml fluid bolus this morning and cont IVFs with NS @100ml/h (will need to monitor respiratory status given hx of severe diastolic dysfunction)  -- place on IV Cipro on admission dt PCN allergy (causes hives/edema), changed to ertapenem postop  -- urine culture drawn on admission grew 50-100k strep agalactiae (sensitive to PCNs, Vanco and  "cephalosporins) -- unclear how much these findings have been contributing to current condition given above  -- blood culture drawn on admission remains negative     Hypertension;Hyperlipidemia'  Chronic diastolic dysfunction (reported as severe on last echo in 4/2018)  Last echo in 4/2018 showed probable severe diastolic dysfunction of the left ventricle, EF 65-70%, mod to severe LAE and mild MR; RVSF nl.  PTA meds: atenolol 50mg daily, amlodipine 10mg daily. Not on chronic anticoagulation, aspirin or statin       COPD  Chronic and stable on home inhalers, including aformoterol, budesonide, tiotropium and prn albuterol. No s/sx of exacerbation  - monitor respiratory status  - no wheezing, no indication for steroids at prsent  - O2 prn         DVT Prophylaxis:   -PCDs, subQ heparin    Code Status:   -Full Code      Disposition:  -pending clinical course suspect 1-2 days, and then TCU    Discussed the care with patient and her RN                  Physical Exam:      Heart Rate: 81, Blood pressure (!) 142/62, pulse 85, temperature 98.2  F (36.8  C), temperature source Oral, resp. rate 18, height 1.549 m (5' 1\"), weight 45.9 kg (101 lb 3.2 oz), SpO2 97 %.  Vitals:    08/08/19 0534 08/09/19 0800 08/10/19 0625   Weight: 45.9 kg (101 lb 3.1 oz) 44.5 kg (98 lb) 45.9 kg (101 lb 3.2 oz)     Vital Signs with Ranges  Temp:  [97.1  F (36.2  C)-98.4  F (36.9  C)] 98.2  F (36.8  C)  Pulse:  [] 85  Heart Rate:  [79-97] 81  Resp:  [16-24] 18  BP: (117-143)/(52-78) 142/62  SpO2:  [93 %-98 %] 97 %  I/O's Last 24 hours  I/O last 3 completed shifts:  In: 2535 [P.O.:710; I.V.:1825]  Out: 2610 [Urine:1610; Emesis/NG output:1000]    Constitutional: Alert awake oriented  Mild confusion   Lungs: Fairly good air entry   Cardiovascular: S1 and S2 no S3      Abdomen: Soft bowel sounds sluggish   Skin:    Other:           Medications:          arformoterol  15 mcg Nebulization BID     budesonide  0.5 mg Nebulization BID     ertapenem " (INVanz) IV  1 g Intravenous Q24H     heparin  5,000 Units Subcutaneous Q12H     metoprolol  5 mg Intravenous Q6H     sodium chloride (PF)  3 mL Intracatheter Q8H     umeclidinium  1 puff Inhalation Daily     PRN Meds: albuterol, albuterol, HYDROmorphone, hypromellose-dextran, lidocaine 4%, lidocaine (buffered or not buffered), melatonin, metoprolol, naloxone, nitroGLYcerin, ondansetron **OR** ondansetron, potassium chloride, potassium chloride with lidocaine, potassium chloride, potassium chloride, potassium chloride, sodium chloride (PF)         Data:      All new lab and imaging data was reviewed.   Recent Labs   Lab Test 08/10/19  0435 08/08/19  0749 08/07/19  1200 08/07/19  0850   WBC 7.9 13.5*  --  20.4*   HGB 9.4* 9.3*  --  10.0*   MCV 90 90  --  90    280 299 349      Recent Labs   Lab Test 08/10/19  0435 08/09/19  0612 08/08/19  1423   * 153* 155*   POTASSIUM 2.9* 2.8* 3.5   CHLORIDE 105 110* 119*   CO2 36* 38* 28   BUN 15 21 29   CR 0.84 0.79 0.92   ANIONGAP 4 5 8   LUIS ENRIQUE 8.2* 8.2* 8.9   * 151* 121*     No lab results found.    Invalid input(s): TROP, TROPONINIES

## 2019-08-10 NOTE — PROGRESS NOTES
Dr Hernandez notified of watery bloody stools x 2 this afternoon. C'Diff order does not meet requirements.  CBC ordered, on call General Surgery , Dr Vanessa calzada.

## 2019-08-10 NOTE — PROGRESS NOTES
Afebrile, pulse OK. Sleepy. Abdomen soft and not tender. Her new labs show low K, but better. Na still up. NG came out. Should be ok to start sips slowly. Walk some. Give potassium.

## 2019-08-10 NOTE — PLAN OF CARE
Patient appears to be disorientated at times, vital signs stable, pain managed with Dilaudid. Patient refused potassium recheck at 20:00, education provided. 04:35 writer talked with patient and and she decided to have labs rechecked. Potassium 2.9, replaced 10 mEq and second bag infusing. Tele: SR with PVC.

## 2019-08-10 NOTE — PLAN OF CARE
Patient appears to be disorientated at times, vital signs stable, denies pain. Potassium 2.9, replaced 10 mEq and completed recheck at 1300. Tele: SR with PVC. Pt up in chair with assist of one, GB and walker.

## 2019-08-10 NOTE — PLAN OF CARE
A&O x2.  VSS, 1 L O2 NC. Tele SR. NPO. Up to bathroom with assist of 1. Denies pain @ rest.   NG clamped from 1130 to 1730 and no nausea; back to LIS @ 1730 and patient had pulled it out @ 1830, Dr. Brennan Billings notified, okay to leave it out and limit ice chips per MD.   Suppository given during the day without result. Pink lady enema given @ 1900, small maroon stool few minutes after the dose, next RN update and will continue to monitor.

## 2019-08-10 NOTE — PLAN OF CARE
Patient appears to be disorientated at times, vital signs stable, denies pain. Potassium recheck of 3.6. Tele: SR with PVC. Pt up in chair with assist of one, GB and walker. Pt has had watery bloody stools x 2 this afternoon. Hgb recheck 9.2, Dr Mendez notified, continue to monitor.

## 2019-08-11 ENCOUNTER — APPOINTMENT (OUTPATIENT)
Dept: PHYSICAL THERAPY | Facility: CLINIC | Age: 84
DRG: 330 | End: 2019-08-11
Payer: COMMERCIAL

## 2019-08-11 ENCOUNTER — APPOINTMENT (OUTPATIENT)
Dept: OCCUPATIONAL THERAPY | Facility: CLINIC | Age: 84
DRG: 330 | End: 2019-08-11
Payer: COMMERCIAL

## 2019-08-11 LAB
ANION GAP SERPL CALCULATED.3IONS-SCNC: 5 MMOL/L (ref 3–14)
BUN SERPL-MCNC: 11 MG/DL (ref 7–30)
CALCIUM SERPL-MCNC: 8.1 MG/DL (ref 8.5–10.1)
CHLORIDE SERPL-SCNC: 104 MMOL/L (ref 94–109)
CO2 SERPL-SCNC: 32 MMOL/L (ref 20–32)
CREAT SERPL-MCNC: 0.78 MG/DL (ref 0.52–1.04)
ERYTHROCYTE [DISTWIDTH] IN BLOOD BY AUTOMATED COUNT: 14.6 % (ref 10–15)
GFR SERPL CREATININE-BSD FRML MDRD: 69 ML/MIN/{1.73_M2}
GLUCOSE BLDC GLUCOMTR-MCNC: 96 MG/DL (ref 70–99)
GLUCOSE SERPL-MCNC: 102 MG/DL (ref 70–99)
HCT VFR BLD AUTO: 27.5 % (ref 35–47)
HGB BLD-MCNC: 9 G/DL (ref 11.7–15.7)
MCH RBC QN AUTO: 29.1 PG (ref 26.5–33)
MCHC RBC AUTO-ENTMCNC: 32.7 G/DL (ref 31.5–36.5)
MCV RBC AUTO: 89 FL (ref 78–100)
PLATELET # BLD AUTO: 261 10E9/L (ref 150–450)
POTASSIUM SERPL-SCNC: 3 MMOL/L (ref 3.4–5.3)
RBC # BLD AUTO: 3.09 10E12/L (ref 3.8–5.2)
SODIUM SERPL-SCNC: 141 MMOL/L (ref 133–144)
WBC # BLD AUTO: 6.8 10E9/L (ref 4–11)

## 2019-08-11 PROCEDURE — 36415 COLL VENOUS BLD VENIPUNCTURE: CPT | Performed by: SURGERY

## 2019-08-11 PROCEDURE — 94640 AIRWAY INHALATION TREATMENT: CPT | Mod: 76

## 2019-08-11 PROCEDURE — 25000125 ZZHC RX 250: Performed by: HOSPITALIST

## 2019-08-11 PROCEDURE — 25000132 ZZH RX MED GY IP 250 OP 250 PS 637: Performed by: INTERNAL MEDICINE

## 2019-08-11 PROCEDURE — 36415 COLL VENOUS BLD VENIPUNCTURE: CPT | Performed by: INTERNAL MEDICINE

## 2019-08-11 PROCEDURE — 40000275 ZZH STATISTIC RCP TIME EA 10 MIN

## 2019-08-11 PROCEDURE — 25000132 ZZH RX MED GY IP 250 OP 250 PS 637: Performed by: PHYSICIAN ASSISTANT

## 2019-08-11 PROCEDURE — 99232 SBSQ HOSP IP/OBS MODERATE 35: CPT | Performed by: INTERNAL MEDICINE

## 2019-08-11 PROCEDURE — 25000125 ZZHC RX 250: Performed by: PHYSICIAN ASSISTANT

## 2019-08-11 PROCEDURE — 25000128 H RX IP 250 OP 636: Performed by: PHYSICIAN ASSISTANT

## 2019-08-11 PROCEDURE — 97535 SELF CARE MNGMENT TRAINING: CPT | Mod: GO

## 2019-08-11 PROCEDURE — 94640 AIRWAY INHALATION TREATMENT: CPT

## 2019-08-11 PROCEDURE — 80048 BASIC METABOLIC PNL TOTAL CA: CPT | Performed by: INTERNAL MEDICINE

## 2019-08-11 PROCEDURE — 85027 COMPLETE CBC AUTOMATED: CPT | Performed by: SURGERY

## 2019-08-11 PROCEDURE — 25800030 ZZH RX IP 258 OP 636: Performed by: INTERNAL MEDICINE

## 2019-08-11 PROCEDURE — 00000146 ZZHCL STATISTIC GLUCOSE BY METER IP

## 2019-08-11 PROCEDURE — 97116 GAIT TRAINING THERAPY: CPT | Mod: GP

## 2019-08-11 PROCEDURE — 12000000 ZZH R&B MED SURG/OB

## 2019-08-11 RX ORDER — ZINC OXIDE 20 %
OINTMENT (GRAM) TOPICAL
Status: DISCONTINUED | OUTPATIENT
Start: 2019-08-11 | End: 2019-08-14 | Stop reason: HOSPADM

## 2019-08-11 RX ADMIN — METOPROLOL TARTRATE 5 MG: 5 INJECTION INTRAVENOUS at 02:02

## 2019-08-11 RX ADMIN — BUDESONIDE 0.5 MG: 0.5 INHALANT RESPIRATORY (INHALATION) at 07:40

## 2019-08-11 RX ADMIN — ERTAPENEM SODIUM 1 G: 1 INJECTION, POWDER, LYOPHILIZED, FOR SOLUTION INTRAMUSCULAR; INTRAVENOUS at 09:15

## 2019-08-11 RX ADMIN — ARFORMOTEROL TARTRATE 15 MCG: 15 SOLUTION RESPIRATORY (INHALATION) at 21:10

## 2019-08-11 RX ADMIN — POTASSIUM CHLORIDE 40 MEQ: 1500 TABLET, EXTENDED RELEASE ORAL at 17:52

## 2019-08-11 RX ADMIN — ARFORMOTEROL TARTRATE 15 MCG: 15 SOLUTION RESPIRATORY (INHALATION) at 07:40

## 2019-08-11 RX ADMIN — HEPARIN SODIUM 5000 UNITS: 5000 INJECTION, SOLUTION INTRAVENOUS; SUBCUTANEOUS at 01:59

## 2019-08-11 RX ADMIN — DEXTROSE MONOHYDRATE: 50 INJECTION, SOLUTION INTRAVENOUS at 09:16

## 2019-08-11 RX ADMIN — BUDESONIDE 0.5 MG: 0.5 INHALANT RESPIRATORY (INHALATION) at 21:10

## 2019-08-11 RX ADMIN — UMECLIDINIUM 1 PUFF: 62.5 AEROSOL, POWDER ORAL at 17:52

## 2019-08-11 RX ADMIN — METOPROLOL TARTRATE 12.5 MG: 25 TABLET, FILM COATED ORAL at 21:08

## 2019-08-11 RX ADMIN — METOPROLOL TARTRATE 5 MG: 5 INJECTION INTRAVENOUS at 09:16

## 2019-08-11 ASSESSMENT — ACTIVITIES OF DAILY LIVING (ADL)
ADLS_ACUITY_SCORE: 17

## 2019-08-11 ASSESSMENT — MIFFLIN-ST. JEOR: SCORE: 841.38

## 2019-08-11 NOTE — PLAN OF CARE
Discharge Planner PT   Patient plan for discharge: TCU per daughter  Current status: Patient ambulated 350 ft x 1 with bilateral UE support on IV pole and CGA; slow pace. Attempted taking steps without use of assistive device however patient unable to perform safely. Encouraged patient to use FWW at this time to ensure safety. Transferred into chair with CGA and cues for technique. Daughter present throughout session.   Barriers to return to prior living situation: Level of assist, Decreased activity tolerance, Use of O2, Fall risk  Recommendations for discharge: TCU per plan established by the PT.  Rationale for recommendations: Pt is below baseline in regards to independent functional mobility and would benefit from continued therapy in order to increase activity tolerance and improve balance.       Entered by: Kalie Alan 08/11/2019 3:58 PM

## 2019-08-11 NOTE — PLAN OF CARE
Discharge Planner OT   Patient plan for discharge: Pt plans to go to [facility]      Current status: Pt able to don/doff socks IND sitting in chair. Pt required min A for sit <> stand transfer with use of FWW, cues needed for safety and to maintain ab precautions. Pt completed toilet transfer with min A and cues for fWW as wanting to pull up on walker. Pt declined further activity as she requested to sleep. Pt required min A for bed mobility sit > supine with cues for ab precautions.     Barriers to return to prior living situation: Level of A, decreased strength, impaired cognition, alone during day     Recommendations for discharge: TCU    Rationale for recommendations: Pt would benefit from continued skilled OT services to improve independence and safety with ADL's and functional transfers as pt is not at baseline.           Entered by: Nalini Alvarenga 08/11/2019 11:45 AM

## 2019-08-11 NOTE — PROGRESS NOTES
[facility]  Hospitalist Progress Note  Name: Melissa Alfaro    MRN: [id number]  Physician:  Garret Agrawal DO, FHM (Text Page)    Summary of Stay: Melissa Alfaro is a 85 year old female who was admitted on 8/4/2019.      Assessment & Plan    85 year old female with PMHx of hypertension, hyperlipidemia, paroxysmal afib (not on chronic anticoagulation), COPD, pulmonary hypertension, COPD, carotid stenosis, IBS, insomonia and anxiety among other medical problems who was admitted on 8/4/2019 with abdominal pain secondary to SBO vs ileus.     Patient recently moved from [place], [place] to [facility] to live with her daughter. Most of her previous care was through [facility].    SBO, s/p exploratory lap with lysis of adhesions and resection of small bowel on 8/7/19:  -  Post-op site cares, activity restrictions, pain medications, DVT proph per surgical service.  She had some mild rectal blood noted.  Hgb has been fairly stable.  Surgical service has held heparin subcut for now.    -  Patient remains on invanz following OR.  I will defer course to surgery.  Her UTI has already been adequately been treated.  ? Loose stools partly related to abx side effect.  No increased abdominal pain, leukocytosis.  I would not test for C. Diff right now unless new findings develop.    Paroxysmal Afib:  -  Change IV metoprolol to oral metoprolol split dose.  As she already had IV metoprolol for part of today, I will continue BID lower dose oral metoprolol this evening.  Tomorrow if BP reasonable, I will transition back to PTA atenolol.    COPD without an acute exacerbation:  Chronic and stable on home inhalers, including aformoterol, budesonide, tiotropium and prn albuterol. No s/sx of exacerbation  - monitor respiratory status  - no wheezing, no indication for steroids at prsent    UTI, initial concern over possible pyelonephritis:  Noted on CT to have horseshoe shaped kidneys and some perinephric stranding suggestive  of pyelonephritis. UA on admission showed lg leuk est with 21 WBCs, 3 RBCs and few squam cells.   -- place on IV Cipro on admission dt PCN allergy (causes hives/edema), changed to ertapenem postop  -- urine culture drawn on admission grew 50-100k strep agalactiae (sensitive to PCNs, Vanco and cephalosporins) -- unclear how much these findings have been contributing to current condition given above  -- blood culture drawn on admission remains negative  --UTI reasonably treated already this admission.  Now Day #8 abx.  I will defer course of ertapenem to the surgical service.    SANDRA - Resolved    Hypernatremia/free-water deficit:  -  Taking more oral intake in.  Recheck BMP pending this afternoon.  I will consider IVF adjustment once labs back.    Deconditioning:  -  Therapy consulted this stay, ambulating daily       Addendum 1610:  Potassium low and will be replaced.  Also asked the RN to check a glu toward bedtime and during the night.  She ate/drank better at supper and sodium improved on labs so I will stop D5 water IVF that were running.    Diet: Room Service  Full Liquid Diet    DVT Prophylaxis: Ambulating a few times daily, Heparin subcut on hold with prior bleeding concern  Doran Catheter: not present  Code Status: Full Code      Disposition Plan   Discharge timing per surgical service unless new medical issues arise.  Likely needs at least a couple more days in the hospital.     Entered: Garret Agrawal 08/11/2019, 3:36 PM       Interval History   Assumed care, history reviewed.  Ms. Alfaro is slowly feeling better.  Reports only mild surgical site pain that is controlled.  Denies nausea.  Appetite still poor but starting to feel more hungry.  Having a few loose small stools today.  No further stool/rectal bleeding noted.  No other new complaints    -Data reviewed today: I reviewed all new labs and imaging reports over the last 24 hours. I personally reviewed no images or EKG's today.    Physical Exam    Temp: 97.2  F (36.2  C) Temp src: Oral BP: 114/61 Pulse: 88 Heart Rate: 94 Resp: 18 SpO2: 96 % O2 Device: None (Room air)    Vitals:    08/09/19 0800 08/10/19 0625 08/11/19 0200   Weight: 44.5 kg (98 lb) 45.9 kg (101 lb 3.2 oz) 45.9 kg (101 lb 3.1 oz)     Vital Signs with Ranges  Temp:  [97.2  F (36.2  C)-98.4  F (36.9  C)] 97.2  F (36.2  C)  Pulse:  [85-96] 88  Heart Rate:  [84-94] 94  Resp:  [16-18] 18  BP: (111-155)/(49-70) 114/61  SpO2:  [90 %-97 %] 96 %  I/O last 3 completed shifts:  In: 1575.84 [P.O.:220; I.V.:1355.84]  Out: 1600 [Urine:1600]    GEN:  Alert, oriented x 3, appears comfortable, no overt distress.  HEENT:  Normocephalic/atraumatic, no scleral icterus, no nasal discharge, mouth moist.  CV:  Regular rate and rhythm, distant.  No loud murmur/rub.  LUNGS:  Clear to auscultation bilaterally without rales/rhonchi/wheezing/retractions.  Symmetric chest rise on inhalation noted.  ABD:  Active bowel sounds, soft, mildly distended.  Detailed surgical site exam deferred to surgical service.  EXT:  No edema.  No cyanosis.  No acute joint synovitis noted.  SKIN:  Dry to touch, no exanthems noted in the visualized areas.    Medications     D5W 50 mL/hr at 08/11/19 0916       arformoterol  15 mcg Nebulization BID     budesonide  0.5 mg Nebulization BID     ertapenem (INVanz) IV  1 g Intravenous Q24H     metoprolol  5 mg Intravenous Q6H     sodium chloride (PF)  3 mL Intracatheter Q8H     umeclidinium  1 puff Inhalation Daily     Data     Recent Labs   Lab 08/11/19  0804 08/10/19  1819 08/10/19  0435   WBC 6.8 8.8 7.9   HGB 9.0* 9.2* 9.4*   HCT 27.5* 28.8* 29.3*   MCV 89 91 90    319 308     Recent Labs   Lab 08/04/19  2200   CULT 50,000 to 100,000 colonies/mL  Streptococcus agalactiae sero group B  Susceptibility testing not routinely done on this organism from the genitourinary tract.   Our antibiogram indicates that Group B streptococci are susceptible to ampicillin,   penicillin, vancomycin and the  cephalosporins. Susceptibility testing must be requested   within 5 days.  *  Susceptibility testing requested by  Dr. Goodman ([phone]) on 8.8.2019 at 1147. KVO  Please do sens on Group B strep       Recent Labs   Lab 08/10/19  1357 08/10/19  0435 08/09/19  0612 08/08/19  1423 08/08/19  0749   NA  --  145* 153* 155* 153*   POTASSIUM 3.6 2.9* 2.8* 3.5 3.0*   CHLORIDE  --  105 110* 119* 116*   CO2  --  36* 38* 28 28   ANIONGAP  --  4 5 8 9   GLC  --  115* 151* 121* 112*   BUN  --  15 21 29 27   CR  --  0.84 0.79 0.92 0.96   GFRESTIMATED  --  63 68 56* 54*   GFRESTBLACK  --  73 78 65 62   LUIS ENRIQUE  --  8.2* 8.2* 8.9 8.5   MAG  --   --   --   --  2.2       No results found for this or any previous visit (from the past 24 hour(s)).

## 2019-08-11 NOTE — PROGRESS NOTES
"General Surgery Note    Stable S/P Ex Lap with MAYURI and segmental small bowel resection  POD4    -Clear liquids.  -Can switch over to PO meds now.  -Increase activity as able  -Repeat hgb tomorrow (despite bloody stools has dropped from 9.3 POD1 to 9.0 today)  -Will weigh option of stopping heparin with Dr. Billings.    Feels sick.  Vague symptoms, but just can tell things are still upset in her abdomen.  No appetite yet.  Had a few loose, bloody stools.  She reports that she feels they are getting less red.  She reports she has always been a little anemic, but this has improved with age actually and usually in 10's or 11's.    NAD, pleasant, A&O  Up in chair  BP (!) 155/70 (BP Location: Left arm)   Pulse 91   Temp 98.4  F (36.9  C) (Oral)   Resp 17   Ht 1.549 m (5' 1\")   Wt 45.9 kg (101 lb 3.1 oz)   SpO2 92%   BMI 19.12 kg/m      Intake/Output Summary (Last 24 hours) at 8/11/2019 0915  Last data filed at 8/11/2019 0833  Gross per 24 hour   Intake 1575.84 ml   Output 1600 ml   Net -24.16 ml     WBC: 6.8  Hgb: 9.0  "

## 2019-08-11 NOTE — PROGRESS NOTES
SW:    I: SW following for discharge planning. Pt clinically accepted at [facility] for TCU. SW received approval from [facility] and pt has been approved for insurance coverage, auth number [id number]. Approval letter on pt chart.     P: SW to follow.    PAS-RR    D: Per DHS regulation, SW completed and submitted PAS-RR to [place] [employer] Direct Connect via the Senior LinkAge Line.  PAS-RR confirmation # is : [id number]    I: SW spoke with pt and they are aware a PAS-RR has been submitted.  SW reviewed with pt that they may be contacted for a follow up appointment within 10 days of hospital discharge if their SNF stay is < 30 days.  Contact information for [facility] LinkAge Line was also provided.    A: Pt verbalized understanding.    P: Further questions may be directed to [facility] LinkAge Line at #[phone], option #4 for PAS-RR staff.      AN Martinez, [facility]  Daytime (8:00am-4:30pm): [phone]  After-Hours SW Pager (4:30pm-11:30pm): [phone]

## 2019-08-11 NOTE — PLAN OF CARE
Pt continues to have small amounts of loose stool. VSS, afebrile. Ambulates with assist of one and walker. Tolerating toast and tea this a.m.

## 2019-08-11 NOTE — PLAN OF CARE
Patient disorientated at times, easily redirected. Vital signs stable, Denies pain.  Potassium recheck 3.0 PO replacement started. Tele: SR with PVC. Pt started on full liquids, tolerating well, continues to have poor appetite. Pt has had numerous loose green to brown stools today. Pt daughter at bedside. Will continue to monitor.

## 2019-08-11 NOTE — PLAN OF CARE
Patient is alert, up with assistance of one, vital signs stable, denies pain. incision CDI. Tele:SR with PAC. BM X 2 maroon brown.

## 2019-08-12 ENCOUNTER — APPOINTMENT (OUTPATIENT)
Dept: OCCUPATIONAL THERAPY | Facility: CLINIC | Age: 84
DRG: 330 | End: 2019-08-12
Payer: COMMERCIAL

## 2019-08-12 ENCOUNTER — APPOINTMENT (OUTPATIENT)
Dept: PHYSICAL THERAPY | Facility: CLINIC | Age: 84
DRG: 330 | End: 2019-08-12
Payer: COMMERCIAL

## 2019-08-12 LAB
ANION GAP SERPL CALCULATED.3IONS-SCNC: 4 MMOL/L (ref 3–14)
BUN SERPL-MCNC: 8 MG/DL (ref 7–30)
CALCIUM SERPL-MCNC: 8.1 MG/DL (ref 8.5–10.1)
CHLORIDE SERPL-SCNC: 110 MMOL/L (ref 94–109)
CO2 SERPL-SCNC: 30 MMOL/L (ref 20–32)
CREAT SERPL-MCNC: 0.77 MG/DL (ref 0.52–1.04)
GFR SERPL CREATININE-BSD FRML MDRD: 70 ML/MIN/{1.73_M2}
GLUCOSE BLDC GLUCOMTR-MCNC: 77 MG/DL (ref 70–99)
GLUCOSE BLDC GLUCOMTR-MCNC: 83 MG/DL (ref 70–99)
GLUCOSE SERPL-MCNC: 86 MG/DL (ref 70–99)
HGB BLD-MCNC: 8.4 G/DL (ref 11.7–15.7)
MAGNESIUM SERPL-MCNC: 1.6 MG/DL (ref 1.6–2.3)
POTASSIUM SERPL-SCNC: 3.2 MMOL/L (ref 3.4–5.3)
POTASSIUM SERPL-SCNC: 3.9 MMOL/L (ref 3.4–5.3)
SODIUM SERPL-SCNC: 144 MMOL/L (ref 133–144)

## 2019-08-12 PROCEDURE — 80048 BASIC METABOLIC PNL TOTAL CA: CPT | Performed by: PHYSICIAN ASSISTANT

## 2019-08-12 PROCEDURE — 97535 SELF CARE MNGMENT TRAINING: CPT | Mod: GO

## 2019-08-12 PROCEDURE — 25000132 ZZH RX MED GY IP 250 OP 250 PS 637: Performed by: INTERNAL MEDICINE

## 2019-08-12 PROCEDURE — 00000146 ZZHCL STATISTIC GLUCOSE BY METER IP

## 2019-08-12 PROCEDURE — 97110 THERAPEUTIC EXERCISES: CPT | Mod: GP | Performed by: PHYSICAL THERAPIST

## 2019-08-12 PROCEDURE — 93010 ELECTROCARDIOGRAM REPORT: CPT | Performed by: INTERNAL MEDICINE

## 2019-08-12 PROCEDURE — 12000000 ZZH R&B MED SURG/OB

## 2019-08-12 PROCEDURE — 36415 COLL VENOUS BLD VENIPUNCTURE: CPT | Performed by: PHYSICIAN ASSISTANT

## 2019-08-12 PROCEDURE — 85018 HEMOGLOBIN: CPT | Performed by: PHYSICIAN ASSISTANT

## 2019-08-12 PROCEDURE — 97112 NEUROMUSCULAR REEDUCATION: CPT | Mod: GP | Performed by: PHYSICAL THERAPIST

## 2019-08-12 PROCEDURE — 83735 ASSAY OF MAGNESIUM: CPT | Performed by: PHYSICIAN ASSISTANT

## 2019-08-12 PROCEDURE — 36415 COLL VENOUS BLD VENIPUNCTURE: CPT | Performed by: INTERNAL MEDICINE

## 2019-08-12 PROCEDURE — 25000132 ZZH RX MED GY IP 250 OP 250 PS 637: Performed by: PHYSICIAN ASSISTANT

## 2019-08-12 PROCEDURE — 97530 THERAPEUTIC ACTIVITIES: CPT | Mod: GP | Performed by: PHYSICAL THERAPIST

## 2019-08-12 PROCEDURE — 93005 ELECTROCARDIOGRAM TRACING: CPT

## 2019-08-12 PROCEDURE — 94640 AIRWAY INHALATION TREATMENT: CPT

## 2019-08-12 PROCEDURE — 94640 AIRWAY INHALATION TREATMENT: CPT | Mod: 76

## 2019-08-12 PROCEDURE — 99233 SBSQ HOSP IP/OBS HIGH 50: CPT | Performed by: INTERNAL MEDICINE

## 2019-08-12 PROCEDURE — 40000275 ZZH STATISTIC RCP TIME EA 10 MIN

## 2019-08-12 PROCEDURE — 25000128 H RX IP 250 OP 636: Performed by: PHYSICIAN ASSISTANT

## 2019-08-12 PROCEDURE — 25000125 ZZHC RX 250: Performed by: PHYSICIAN ASSISTANT

## 2019-08-12 PROCEDURE — 84132 ASSAY OF SERUM POTASSIUM: CPT | Performed by: INTERNAL MEDICINE

## 2019-08-12 RX ORDER — ATENOLOL 25 MG/1
25 TABLET ORAL ONCE
Status: COMPLETED | OUTPATIENT
Start: 2019-08-12 | End: 2019-08-12

## 2019-08-12 RX ORDER — ATENOLOL 50 MG/1
50 TABLET ORAL DAILY
Status: DISCONTINUED | OUTPATIENT
Start: 2019-08-13 | End: 2019-08-14 | Stop reason: HOSPADM

## 2019-08-12 RX ADMIN — BUDESONIDE 0.5 MG: 0.5 INHALANT RESPIRATORY (INHALATION) at 19:59

## 2019-08-12 RX ADMIN — ERTAPENEM SODIUM 1 G: 1 INJECTION, POWDER, LYOPHILIZED, FOR SOLUTION INTRAMUSCULAR; INTRAVENOUS at 08:52

## 2019-08-12 RX ADMIN — METOPROLOL TARTRATE 12.5 MG: 25 TABLET, FILM COATED ORAL at 08:10

## 2019-08-12 RX ADMIN — POTASSIUM CHLORIDE 20 MEQ: 1500 TABLET, EXTENDED RELEASE ORAL at 10:55

## 2019-08-12 RX ADMIN — UMECLIDINIUM 1 PUFF: 62.5 AEROSOL, POWDER ORAL at 18:53

## 2019-08-12 RX ADMIN — BUDESONIDE 0.5 MG: 0.5 INHALANT RESPIRATORY (INHALATION) at 08:15

## 2019-08-12 RX ADMIN — ATENOLOL 25 MG: 25 TABLET ORAL at 11:11

## 2019-08-12 RX ADMIN — POTASSIUM CHLORIDE 40 MEQ: 1500 TABLET, EXTENDED RELEASE ORAL at 08:51

## 2019-08-12 RX ADMIN — ARFORMOTEROL TARTRATE 15 MCG: 15 SOLUTION RESPIRATORY (INHALATION) at 19:59

## 2019-08-12 RX ADMIN — ARFORMOTEROL TARTRATE 15 MCG: 15 SOLUTION RESPIRATORY (INHALATION) at 08:20

## 2019-08-12 ASSESSMENT — MIFFLIN-ST. JEOR: SCORE: 800.38

## 2019-08-12 ASSESSMENT — ACTIVITIES OF DAILY LIVING (ADL)
ADLS_ACUITY_SCORE: 17

## 2019-08-12 NOTE — PROGRESS NOTES
[facility]    General Surgery  Daily Post-Op Note       Assessment and Plan:   Melissa Alfaro is a 85 year old female S/P Procedure(s):  EXPLORATORY LAPAROTOMY, LYSIS OF ADHESION, SMALL BOWEL RESECTION, 5 Days Post-Op    Pain: minimal to none  Bowel: +gas, +loose stools  Diet: ok to advance to regular diet with supplemental shakes  Activity: encourage ambulation 3-4x daily with assistance, PT/OT  Antibiotics: Invanz stopped  DVT prophylaxis: heparin on hold secondary to bloody stools, none since Sat   Dispo: 1-2 days depending on clinical course and TCU bed avialability        Interval History:   Pt up in room.  Having loose stools this morning.  Denies nausea, tolerating diet but not much of an appetite.  Denies pain.            Physical Exam:   Temp: 97.9  F (36.6  C) Temp src: Oral BP: 132/53 Pulse: 101 Heart Rate: 105 Resp: 16 SpO2: 95 % O2 Device: None (Room air)      I/O last 3 completed shifts:  In: 450 [P.O.:450]  Out: 400 [Urine:400]      Constitutional: alert and no distress   Abdomen: Abdomen soft, non-tender. Incisions - staples in place - clean/dry/intact       Data   Recent Labs   Lab 08/12/19  0730 08/11/19  1639 08/11/19  0804 08/10/19  1819 08/10/19  1357 08/10/19  0435   WBC  --   --  6.8 8.8  --  7.9   HGB 8.4*  --  9.0* 9.2*  --  9.4*   MCV  --   --  89 91  --  90   PLT  --   --  261 319  --  308    141  --   --   --  145*   POTASSIUM 3.2* 3.0*  --   --  3.6 2.9*   CHLORIDE 110* 104  --   --   --  105   CO2 30 32  --   --   --  36*   BUN 8 11  --   --   --  15   CR 0.77 0.78  --   --   --  0.84   ANIONGAP 4 5  --   --   --  4   LUIS ENRIQUE 8.1* 8.1*  --   --   --  8.2*   GLC 86 102*  --   --   --  115*       Jocelyn Daniel PA-C

## 2019-08-12 NOTE — PLAN OF CARE
Discharge Planner OT     Patient plan for discharge: none stated    Current status: Patient required MOD A to don sweater while seated. Patient reported difficulty because it felt like a straight jacket and she couldn't move well in it. Patient donned 1 arm with little difficulty, requiring A to thread the other arm through. Patient transfers from chair and toilet with MIN A, FWW. Patient SBA with pericares on toilet and g/h standing at sink. Patient required re-ed about walker safety during transfers and ambulation, often pushing walker far ahead and moving it off to the side. Patient demo'd moderately better walker safety as session progressed.    Barriers to return to prior living situation: level of A, decreased strength, impaired cognition, unsafe with walker     Recommendations for discharge: TCU    Rationale for recommendations: Patient is below baseline in ADL/IADL and functional mobility. Patient would benefit from continued OT services to address decreased ADL/IADL and safety.          Entered by: Janet Porter 08/12/2019 4:41 PM

## 2019-08-12 NOTE — PROGRESS NOTES
[facility]  Hospitalist Progress Note  Name: Melissa Alfaro    MRN: [id number]  Physician:  Garret Agrawal DO, FHM (Text Page)    Summary of Stay: Melissa Alfaro is a 85 year old female who was admitted on 8/4/2019.      Assessment & Plan    85 year old female with PMHx of hypertension, hyperlipidemia, paroxysmal afib (not on chronic anticoagulation), COPD, pulmonary hypertension, COPD, carotid stenosis, IBS, insomonia and anxiety among other medical problems who was admitted on 8/4/2019 with abdominal pain secondary to SBO vs ileus.     Patient recently moved from [place], [place] to [facility] to live with her daughter. Most of her previous care was through [facility].    SBO, s/p exploratory lap with lysis of adhesions and resection of small bowel on 8/7/19  Mild acute on chronic anemia, likely degree of acute blood loss anemia from surgery:  -  Post-op site cares, activity restrictions, pain medications, DVT proph per surgical service.  She had some mild rectal blood noted.  Hgb has been fairly stable.  Surgical service has held heparin subcut for now.    -  Patient had been on invanz following OR.  Surgery discontinued it today.  Her UTI has already been adequately been treated.  ? Loose stools partly related to abx side effect.  No increased abdominal pain, leukocytosis.  I would not test for C. Diff right now unless new findings develop.  -  No indication for a current transfusion.  I don't think anemia related to brief mild rectal blood seen a couple days ago.  No current bleeding noted.  Recheck hgb again tomorrow.    Paroxysmal Afib  Hypertension:  -  Resume atenolol.  As already received low dose metoprolol this AM I will resume atenolol half dose today and then full dose tomorrow.  -  Will consider resuming amlodipine tomorrow depending on BP trend back on atenolol.    COPD without an acute exacerbation:  Chronic and stable on home inhalers, including aformoterol, budesonide,  tiotropium and prn albuterol. No s/sx of exacerbation  - monitor respiratory status  - no wheezing, no indication for steroids at prsent    UTI, initial concern over possible pyelonephritis:  Noted on CT to have horseshoe shaped kidneys and some perinephric stranding suggestive of pyelonephritis. UA on admission showed lg leuk est with 21 WBCs, 3 RBCs and few squam cells.   -- place on IV Cipro on admission dt PCN allergy (causes hives/edema), changed to ertapenem postop which was discontinued 8/12.  -- urine culture drawn on admission grew 50-100k strep agalactiae (sensitive to PCNs, Vanco and cephalosporins) -- unclear how much these findings have been contributing to current condition.  Felt treated at this point.  -- blood culture drawn on admission remains negative  --UTI reasonably treated already this admission.  8/12 was day #9 abx.  Ertapenem discontinued by surgical service.     SANDRA - Resolved    Hypernatremia/free-water deficit  Hypokalemia:  -  Taking more oral intake in.  Sodium reasonable today.  Recheck again tomorrow.  -  Replacing potassium per protocol.    Deconditioning:  -  Therapy consulted this stay, ambulating daily       Diet: Room Service  Regular Diet Adult  Snacks/Supplements Adult: Boost Shake; Between Meals    DVT Prophylaxis: Ambulating a few times daily, Heparin subcut on hold with prior bleeding concern  Doran Catheter: not present  Code Status: Full Code      Disposition Plan   Discharge timing per surgical service unless new medical issues arise.  Likely ready for TCU/rehab in another day or two.     Entered: Garret Agrawal 08/12/2019, 10:59 AM       Interval History   Ms. Alfaro is slowly feeling better.  Reports only mild surgical site pain that is controlled.  Denies nausea.  Appetite poor today but trying to take more in.  No other new complaints.  RN reports an EKG was done earlier today when they noted a HR a little above 100.  Patient did not have any acute complaints at the  time.  She feels she is gradually feeling better.    -Data reviewed today: I reviewed all new labs and imaging reports over the last 24 hours. I personally reviewed the EKG tracing showing sinus tach.    Physical Exam   Temp: 97.9  F (36.6  C) Temp src: Oral BP: 132/53 Pulse: 101 Heart Rate: 105 Resp: 16 SpO2: 95 % O2 Device: None (Room air)    Vitals:    08/10/19 0625 08/11/19 0200 08/12/19 0530   Weight: 45.9 kg (101 lb 3.2 oz) 45.9 kg (101 lb 3.1 oz) 41.8 kg (92 lb 2.4 oz)     Vital Signs with Ranges  Temp:  [97.2  F (36.2  C)-98.4  F (36.9  C)] 97.9  F (36.6  C)  Pulse:  [] 101  Heart Rate:  [] 105  Resp:  [16-18] 16  BP: (111-180)/(49-86) 132/53  SpO2:  [94 %-97 %] 95 %  I/O last 3 completed shifts:  In: 450 [P.O.:450]  Out: 400 [Urine:400]    GEN:  Alert, oriented x 3, appears comfortable, no overt distress.  Up in the chair today when I saw her.  HEENT:  Normocephalic/atraumatic, no scleral icterus, no nasal discharge, mouth moist.  CV:  Regular rate and rhythm, distant.  No loud murmur/rub.  LUNGS:  Clear to auscultation bilaterally without rales/rhonchi/wheezing/retractions.  Breath sounds mildly diminished bases.  Symmetric chest rise on inhalation noted.  ABD:  Active bowel sounds, soft, mildly distended.  Detailed surgical site exam deferred to surgical service.  EXT:  No edema.  No cyanosis.  No acute joint synovitis noted.  SKIN:  Dry to touch, no exanthems noted in the visualized areas.    Medications       arformoterol  15 mcg Nebulization BID     atenolol  25 mg Oral Once     [START ON 8/13/2019] atenolol  50 mg Oral Daily     budesonide  0.5 mg Nebulization BID     sodium chloride (PF)  3 mL Intracatheter Q8H     umeclidinium  1 puff Inhalation Daily     Data     Recent Labs   Lab 08/12/19  0730 08/11/19  0804 08/10/19  1819 08/10/19  0435   WBC  --  6.8 8.8 7.9   HGB 8.4* 9.0* 9.2* 9.4*   HCT  --  27.5* 28.8* 29.3*   MCV  --  89 91 90   PLT  --  261 319 308       Recent Labs   Lab  08/12/19  0730 08/11/19  1639 08/10/19  1357 08/10/19  0435  08/08/19  0749    141  --  145*   < > 153*   POTASSIUM 3.2* 3.0* 3.6 2.9*   < > 3.0*   CHLORIDE 110* 104  --  105   < > 116*   CO2 30 32  --  36*   < > 28   ANIONGAP 4 5  --  4   < > 9   GLC 86 102*  --  115*   < > 112*   BUN 8 11  --  15   < > 27   CR 0.77 0.78  --  0.84   < > 0.96   GFRESTIMATED 70 69  --  63   < > 54*   GFRESTBLACK 81 80  --  73   < > 62   LUIS ENRIQUE 8.1* 8.1*  --  8.2*   < > 8.5   MAG 1.6  --   --   --   --  2.2    < > = values in this interval not displayed.       No results found for this or any previous visit (from the past 24 hour(s)).

## 2019-08-12 NOTE — PLAN OF CARE
"Discharge Planner PT   Patient plan for discharge: Not discussed today; pt agrees that she is fatigued and \"needs to get stronger\".   Current status: Pt limited by SOB/fatigue today and frequent need for BM. Pt ambulates x15ft x4 today with FWW and CGA. Pt with shuffling gait and unsafe use of AD with AD outside pt TIFFANIE. Completes seated LE strengthening exercises and repetitive sit<>stand x20 no AD. Pt fatigued but able to complete without use of UEs.   Barriers to return to prior living situation: Level of assist, Decreased activity tolerance, Fall risk  Recommendations for discharge: TCU  Rationale for recommendations: Pt is below baseline in regards to independent functional mobility; pt previously able to complete ADLs and ambulate short distances per her report without AD. Pt will benefit from continued skilled therapy in order to increase activity tolerance and improve balance.       Entered by: Mayra Egan 08/12/2019 9:16 AM       "

## 2019-08-12 NOTE — PLAN OF CARE
Patient is alert, up with assistance of one, vital signs stable, denies pain. incision CDI. Tele:SR with PAC. BM X 5 loose and green.

## 2019-08-12 NOTE — PROGRESS NOTES
SW:  Discharge Planner   Discharge Plans in progress: Patient accepted at [facility]. BCBS/ [employer] authorization pending after being submitted on 8/10/19.  Barriers to discharge plan: Insurance auth pending approval (anticipate approval on 8/12/19)  Follow up plan:   -SUMAYA spoke to Chichi in admissions at [facility] who inquired about patient readiness. SUMAYA shared MD note from yesterday indicating patient may need a couple more days in hospital. [facility] has bed ready this afternoon or can hold bed until tomorrow.  -SUMAYA awaiting insurance auth and will notify MD and TCU when obtained.  -SUMAYA to continue to follow and assist with discharge planning.    ADDENDUM: SUMAYA received call from [employer] who provided TCU auth #[id number] and will follow up with faxing auth letter to this writer.    SW to continue to follow and assist with discharge planning.           Entered by: Sammie Anderson 08/12/2019 10:08 AM

## 2019-08-12 NOTE — PLAN OF CARE
VSS. Disoriented to time and situation at times, forgetful. Denies pain. Several loose stools this shift. Potassium replaced, recheck at 1500. SL. RA. Up with 1, GB and walker. Will continue to monitor.

## 2019-08-13 ENCOUNTER — APPOINTMENT (OUTPATIENT)
Dept: OCCUPATIONAL THERAPY | Facility: CLINIC | Age: 84
DRG: 330 | End: 2019-08-13
Payer: COMMERCIAL

## 2019-08-13 ENCOUNTER — APPOINTMENT (OUTPATIENT)
Dept: PHYSICAL THERAPY | Facility: CLINIC | Age: 84
DRG: 330 | End: 2019-08-13
Payer: COMMERCIAL

## 2019-08-13 LAB
ANION GAP SERPL CALCULATED.3IONS-SCNC: 8 MMOL/L (ref 3–14)
BUN SERPL-MCNC: 7 MG/DL (ref 7–30)
C DIFF TOX B STL QL: NEGATIVE
CALCIUM SERPL-MCNC: 8.2 MG/DL (ref 8.5–10.1)
CHLORIDE SERPL-SCNC: 110 MMOL/L (ref 94–109)
CO2 SERPL-SCNC: 26 MMOL/L (ref 20–32)
CREAT SERPL-MCNC: 0.87 MG/DL (ref 0.52–1.04)
ERYTHROCYTE [DISTWIDTH] IN BLOOD BY AUTOMATED COUNT: 14.8 % (ref 10–15)
GFR SERPL CREATININE-BSD FRML MDRD: 61 ML/MIN/{1.73_M2}
GLUCOSE SERPL-MCNC: 77 MG/DL (ref 70–99)
HCT VFR BLD AUTO: 25.8 % (ref 35–47)
HGB BLD-MCNC: 8.3 G/DL (ref 11.7–15.7)
MCH RBC QN AUTO: 28.7 PG (ref 26.5–33)
MCHC RBC AUTO-ENTMCNC: 32.2 G/DL (ref 31.5–36.5)
MCV RBC AUTO: 89 FL (ref 78–100)
PLATELET # BLD AUTO: 266 10E9/L (ref 150–450)
POTASSIUM SERPL-SCNC: 3.8 MMOL/L (ref 3.4–5.3)
RBC # BLD AUTO: 2.89 10E12/L (ref 3.8–5.2)
SODIUM SERPL-SCNC: 144 MMOL/L (ref 133–144)
SPECIMEN SOURCE: NORMAL
WBC # BLD AUTO: 6.2 10E9/L (ref 4–11)

## 2019-08-13 PROCEDURE — 94640 AIRWAY INHALATION TREATMENT: CPT | Mod: 76

## 2019-08-13 PROCEDURE — 87493 C DIFF AMPLIFIED PROBE: CPT | Performed by: INTERNAL MEDICINE

## 2019-08-13 PROCEDURE — 40000275 ZZH STATISTIC RCP TIME EA 10 MIN

## 2019-08-13 PROCEDURE — 36415 COLL VENOUS BLD VENIPUNCTURE: CPT | Performed by: INTERNAL MEDICINE

## 2019-08-13 PROCEDURE — 25000125 ZZHC RX 250: Performed by: PHYSICIAN ASSISTANT

## 2019-08-13 PROCEDURE — 97530 THERAPEUTIC ACTIVITIES: CPT | Mod: GP | Performed by: PHYSICAL THERAPIST

## 2019-08-13 PROCEDURE — 25000132 ZZH RX MED GY IP 250 OP 250 PS 637: Performed by: PHYSICIAN ASSISTANT

## 2019-08-13 PROCEDURE — 99232 SBSQ HOSP IP/OBS MODERATE 35: CPT | Performed by: INTERNAL MEDICINE

## 2019-08-13 PROCEDURE — 94640 AIRWAY INHALATION TREATMENT: CPT

## 2019-08-13 PROCEDURE — 97116 GAIT TRAINING THERAPY: CPT | Mod: GP | Performed by: PHYSICAL THERAPIST

## 2019-08-13 PROCEDURE — 97535 SELF CARE MNGMENT TRAINING: CPT | Mod: GO

## 2019-08-13 PROCEDURE — 25000132 ZZH RX MED GY IP 250 OP 250 PS 637: Performed by: INTERNAL MEDICINE

## 2019-08-13 PROCEDURE — 85027 COMPLETE CBC AUTOMATED: CPT | Performed by: INTERNAL MEDICINE

## 2019-08-13 PROCEDURE — 80048 BASIC METABOLIC PNL TOTAL CA: CPT | Performed by: INTERNAL MEDICINE

## 2019-08-13 PROCEDURE — 12000000 ZZH R&B MED SURG/OB

## 2019-08-13 RX ADMIN — UMECLIDINIUM 1 PUFF: 62.5 AEROSOL, POWDER ORAL at 20:14

## 2019-08-13 RX ADMIN — ATENOLOL 50 MG: 50 TABLET ORAL at 08:43

## 2019-08-13 RX ADMIN — ARFORMOTEROL TARTRATE 15 MCG: 15 SOLUTION RESPIRATORY (INHALATION) at 19:24

## 2019-08-13 RX ADMIN — BUDESONIDE 0.5 MG: 0.5 INHALANT RESPIRATORY (INHALATION) at 19:24

## 2019-08-13 RX ADMIN — ARFORMOTEROL TARTRATE 15 MCG: 15 SOLUTION RESPIRATORY (INHALATION) at 07:03

## 2019-08-13 RX ADMIN — BUDESONIDE 0.5 MG: 0.5 INHALANT RESPIRATORY (INHALATION) at 07:03

## 2019-08-13 ASSESSMENT — ACTIVITIES OF DAILY LIVING (ADL)
ADLS_ACUITY_SCORE: 17

## 2019-08-13 ASSESSMENT — MIFFLIN-ST. JEOR: SCORE: 815.38

## 2019-08-13 NOTE — PLAN OF CARE
"Discharge Planner PT   Patient plan for discharge: [facility]    Current status: Pt making steady progress with skilled PT intervention. Pt appears to have increased situational awareness today and requires less physical assistance. Transfers sit<>stand with SBA, ambulates ~150ft x2 with FWW and CGA and ascends/descends 3 steps with L[place] and bilat UEs on single rail step to gait pattern. VCs throughout for use of FWW; pt at times \"forgetting\" it.  Pt requires 3 seated rest breaks during session; quickly fatigued.   Barriers to return to prior living situation: Level of A, decreased strength, low activity tolerance, impaired cognition, alone during day, per pt report, many stairs in home  Recommendations for discharge: TCU  Rationale for recommendations: Pt would benefit from continued skilled PT services to improve independence and safety with transfers and gait, as pt previously independent within the home.          Entered by: Mayar Egan 08/13/2019 10:16 AM       "

## 2019-08-13 NOTE — PLAN OF CARE
Discharge Planner OT   Patient plan for discharge: Pt plans to go to [facility]      Current status: Pt in supine upon arrival, needing to use bathroom as having loose stools lately. SBA for bed mobility supine > sit. CGA for sit <> stand with FWW. Cues needed for safety as pt attempting to push walker with one hand. Pt required min A for toileting to don/doff briefs. Min A for toilet transfer as pt not aligned correctly with toilet initially. Pt stood at sink x 6 minutes with one seated rest break to complete g/h tasks. Educated pt on energy conservation techniques. Pt ambulated with CGA and FWW for in room mobility, very kyphotic with cues for upright posture.    Barriers to return to prior living situation: Level of A, decreased strength, impaired cognition, alone during day     Recommendations for discharge: TCU    Rationale for recommendations: Pt would benefit from continued skilled OT services to improve independence and safety with ADL's and functional transfers as pt is not at baseline.           Entered by: Nalini Alvarenga 08/13/2019 8:08 AM

## 2019-08-13 NOTE — PROGRESS NOTES
[facility]  Hospitalist Progress Note  Name: Melissa Alfaro    MRN: [id number]  Physician:  Garret Agrawal DO, FHM (Text Page)    Summary of Stay: Melissa Alfaro is a 85 year old female who was admitted on 8/4/2019.      Assessment & Plan    85 year old female with PMHx of hypertension, hyperlipidemia, paroxysmal afib (not on chronic anticoagulation), COPD, pulmonary hypertension, COPD, carotid stenosis, IBS, insomonia and anxiety among other medical problems who was admitted on 8/4/2019 with abdominal pain secondary to SBO vs ileus.     Patient recently moved from [place], [place] to [facility] to live with her daughter. Most of her previous care was through [facility].    SBO, s/p exploratory lap with lysis of adhesions and resection of small bowel on 8/7/19  Mild acute on chronic anemia, likely degree of acute blood loss anemia from surgery:  -  Post-op site cares, activity restrictions, pain medications, DVT proph per surgical service.  She had some mild rectal blood noted earlier in her stay.  Hgb has been fairly stable.  Surgical service has held heparin subcut for now.    -  Patient had been on invanz following OR.  Surgery discontinued it 8/12.  Her UTI has already been adequately been treated.    -  She has had some loose stools since surgery/diet resumed.  Prior surgical service did not feel a C. Diff test was needed.  She does not have increased abdominal discomfort or a leukocytosis.  Her stooling frequency appears to be improving since ertapenem stopped yesterday.    -  No indication for a current transfusion.  I don't think anemia related to brief mild rectal blood seen a couple days ago.  No current bleeding noted.     Addendum 1620:  Notified by RN patients loose stools have increased again this afternoon (4x in past hour) and she has more cramping.  Given this, I will send a C. Diff PCR given the increased stooling this afternoon.  The stooling though may be just related to her  recent diet advance and recent IV antibiotics stopped yesterday.    Paroxysmal Afib  Hypertension:  -   Continue PTA atenolol.  It was resumed full home dose today (8/13).  -   BP controlled today without amlodipine.  Continue to hold for now.    COPD without an acute exacerbation:  Chronic and stable on home inhalers, including aformoterol, budesonide, tiotropium and prn albuterol. No s/sx of exacerbation  - monitor respiratory status  - no wheezing, no indication for steroids at prsent    UTI, initial concern over possible pyelonephritis:  Noted on CT to have horseshoe shaped kidneys and some perinephric stranding suggestive of pyelonephritis. UA on admission showed lg leuk est with 21 WBCs, 3 RBCs and few squam cells.   -- place on IV Cipro on admission dt PCN allergy (causes hives/edema), changed to ertapenem postop which was discontinued 8/12.  -- urine culture drawn on admission grew 50-100k strep agalactiae (sensitive to PCNs, Vanco and cephalosporins) -- unclear how much these findings have been contributing to current condition.  Felt treated at this point.  -- blood culture drawn on admission remains negative  --UTI reasonably treated already this admission.  8/12 was day #9 abx.  Ertapenem discontinued by surgical service.     SANDRA - Resolved    Hypernatremia/free-water deficit  Hypokalemia:  -  Taking more oral intake in.  Sodium reasonable today.  Recheck again tomorrow.  -  Replacing potassium per protocol.    Deconditioning:  -  Therapy consulted this stay, ambulating daily       Diet: Room Service  Regular Diet Adult  Snacks/Supplements Adult: Boost Plus; Between Meals    DVT Prophylaxis: Ambulating a few times daily, Heparin subcut on hold with prior bleeding concern  Doran Catheter: not present  Code Status: Full Code      Disposition Plan   Appears ready for discharge likely tomorrow with continued improvement.  Tentative plan for d/c to TCU/rehab.     Entered: Garret Agrawal 08/13/2019, 2:07 PM        Interval History   Ms. Alfaro is slowly feeling better.  Tolerating diet.  She feels appetite starting to return.  Reports loose stools seem less frequent and a little formed today.  No chest pain, sob, nausea.  ABD pain at surgical site minimal/not increased.  No other new complaints.    -Data reviewed today: I reviewed all new labs and imaging reports over the last 24 hours. I personally reviewed the EKG tracing showing sinus tach.    Physical Exam   Temp: 97.9  F (36.6  C) Temp src: Oral BP: 119/69 Pulse: 80 Heart Rate: 90 Resp: 18 SpO2: 95 % O2 Device: None (Room air)    Vitals:    08/11/19 0200 08/12/19 0530 08/13/19 0615   Weight: 45.9 kg (101 lb 3.1 oz) 41.8 kg (92 lb 2.4 oz) 43.3 kg (95 lb 7.4 oz)     Vital Signs with Ranges  Temp:  [97.6  F (36.4  C)-97.9  F (36.6  C)] 97.9  F (36.6  C)  Pulse:  [80-96] 80  Heart Rate:  [81-90] 90  Resp:  [18] 18  BP: (105-147)/(46-69) 119/69  SpO2:  [94 %-96 %] 95 %  I/O last 3 completed shifts:  In: 495 [P.O.:495]  Out: -     GEN:  Alert, oriented to self and place.  Answers basic questions well.  Appears comfortable, no overt distress.  Up in the chair again when I saw her today.  HEENT:  Normocephalic/atraumatic, no scleral icterus, no nasal discharge, mouth moist.  CV:  Regular rate and rhythm, distant.  No loud murmur/rub.  LUNGS:  Clear to auscultation bilaterally without rales/rhonchi/wheezing/retractions.  Breath sounds mildly diminished bases.  Symmetric chest rise on inhalation noted.  ABD:  Active bowel sounds, soft, mildly distended.  Detailed surgical site exam deferred to surgical service.  EXT:  No edema.  No cyanosis.  No acute joint synovitis noted.  SKIN:  Dry to touch, no exanthems noted in the visualized areas.    Medications       arformoterol  15 mcg Nebulization BID     atenolol  50 mg Oral Daily     budesonide  0.5 mg Nebulization BID     sodium chloride (PF)  3 mL Intracatheter Q8H     umeclidinium  1 puff Inhalation Daily     Data     Recent  Labs   Lab 08/13/19  0657 08/12/19  0730 08/11/19  0804 08/10/19  1819   WBC 6.2  --  6.8 8.8   HGB 8.3* 8.4* 9.0* 9.2*   HCT 25.8*  --  27.5* 28.8*   MCV 89  --  89 91     --  261 319       Recent Labs   Lab 08/13/19  0657 08/12/19  1434 08/12/19  0730 08/11/19  1639  08/08/19  0749     --  144 141   < > 153*   POTASSIUM 3.8 3.9 3.2* 3.0*   < > 3.0*   CHLORIDE 110*  --  110* 104   < > 116*   CO2 26  --  30 32   < > 28   ANIONGAP 8  --  4 5   < > 9   GLC 77  --  86 102*   < > 112*   BUN 7  --  8 11   < > 27   CR 0.87  --  0.77 0.78   < > 0.96   GFRESTIMATED 61  --  70 69   < > 54*   GFRESTBLACK 70  --  81 80   < > 62   LUIS ENRIQUE 8.2*  --  8.1* 8.1*   < > 8.5   MAG  --   --  1.6  --   --  2.2    < > = values in this interval not displayed.       No results found for this or any previous visit (from the past 24 hour(s)).

## 2019-08-13 NOTE — PROGRESS NOTES
SW:    I: SW following for discharge planning. [facility] 8/14/19 . SW to update BC on discharge date tomorrow.    P: Pt to discharge to [facility] via [place] transport on 8/14/19 if medically appropriate.    AN Martinez, [facility]  Daytime (8:00am-4:30pm): [phone]  After-Hours  Pager (4:30pm-11:30pm): [phone]

## 2019-08-13 NOTE — PROGRESS NOTES
[facility]    General Surgery  Daily Post-Op Note       Assessment and Plan:   Melissa Alfaro is a 85 year old female S/P Procedure(s):  EXPLORATORY LAPAROTOMY, LYSIS OF ADHESION, SMALL BOWEL RESECTION, 6 Days Post-Op    Pain: minimal  Bowel: +gas, +BM - feels stools are slowing and becoming more solid  Diet: tolerating regular with boost shakes  Activity: ambulating with assist, PT/OT  DVT prophylaxis: heparin held secondary to blood in stools, no reoccurrence since Sat   Dispo: TCU in next 1-2 days depending on bed availability        Interval History:   Pt lying in bed, sleeping, easily awakens.  Reports abdominal pain even better than yesterday.  Tolerating diet.  States bowel movements have slowed and are becoming more solid.            Physical Exam:   Temp: 97.9  F (36.6  C) Temp src: Oral BP: 128/53 Pulse: 96 Heart Rate: 90 Resp: 18 SpO2: 95 % O2 Device: None (Room air)      I/O last 3 completed shifts:  In: 495 [P.O.:495]  Out: -       Constitutional: alert and no distress   Abdomen: Abdomen soft, non-tender. Staples in place - clean/dry/intact        Data   Recent Labs   Lab 08/13/19  0657 08/12/19  1434 08/12/19  0730 08/11/19  1639 08/11/19  0804 08/10/19  1819   WBC 6.2  --   --   --  6.8 8.8   HGB 8.3*  --  8.4*  --  9.0* 9.2*   MCV 89  --   --   --  89 91     --   --   --  261 319     --  144 141  --   --    POTASSIUM 3.8 3.9 3.2* 3.0*  --   --    CHLORIDE 110*  --  110* 104  --   --    CO2 26  --  30 32  --   --    BUN 7  --  8 11  --   --    CR 0.87  --  0.77 0.78  --   --    ANIONGAP 8  --  4 5  --   --    LUIS ENRIQUE 8.2*  --  8.1* 8.1*  --   --    GLC 77  --  86 102*  --   --        Jocelyn Daniel PA-C

## 2019-08-13 NOTE — PROGRESS NOTES
Paged by nursing about 3 loose bowel movements overnight.  Her white count pending for today a.m., noted Dr. Agrawal's note, he did not want to get the stool tested for C. difficile for now.  She was recently on antibiotics, since there was a decision not to test her yesterday I would leave this decision to the day team.

## 2019-08-13 NOTE — PLAN OF CARE
"Vital signs stable on RA. A/Ox to self, situation. Answered \"[facility]\" and \"July\" when asked about place and date. Per daughter at bedside (whom patient lives with) confusion has been happening the past year or so. abd incisions staples GRABIEL.. LS clear. BS active. passing gas, having loose yellow bms. Tolerated dinner, regular diet. Voiding. Walked halls with SBA gaitbelt, walker. Tele SR w/ PACs.   "

## 2019-08-13 NOTE — PLAN OF CARE
Pt is disoriented to time and place, VSS, on RA, denies pain, tele: NSR. Midline abd incision closed with staples is CDI, is GRABIEL.Pt having frequent small loose stools. LS clear, BS+ audible and normoactive, flatus+, voiding adequately. Pt is up with assist x1 with walker, is on regular diet-small appetite, denies N/V.

## 2019-08-14 ENCOUNTER — APPOINTMENT (OUTPATIENT)
Dept: PHYSICAL THERAPY | Facility: CLINIC | Age: 84
DRG: 330 | End: 2019-08-14
Payer: COMMERCIAL

## 2019-08-14 VITALS
HEIGHT: 61 IN | WEIGHT: 108.25 LBS | TEMPERATURE: 97.6 F | HEART RATE: 75 BPM | RESPIRATION RATE: 16 BRPM | BODY MASS INDEX: 20.44 KG/M2 | OXYGEN SATURATION: 97 % | DIASTOLIC BLOOD PRESSURE: 50 MMHG | SYSTOLIC BLOOD PRESSURE: 115 MMHG

## 2019-08-14 LAB — INTERPRETATION ECG - MUSE: NORMAL

## 2019-08-14 PROCEDURE — 25000132 ZZH RX MED GY IP 250 OP 250 PS 637: Performed by: INTERNAL MEDICINE

## 2019-08-14 PROCEDURE — 25000125 ZZHC RX 250: Performed by: PHYSICIAN ASSISTANT

## 2019-08-14 PROCEDURE — 97530 THERAPEUTIC ACTIVITIES: CPT | Mod: GP | Performed by: PHYSICAL THERAPIST

## 2019-08-14 PROCEDURE — 99239 HOSP IP/OBS DSCHRG MGMT >30: CPT | Performed by: INTERNAL MEDICINE

## 2019-08-14 PROCEDURE — 94640 AIRWAY INHALATION TREATMENT: CPT

## 2019-08-14 PROCEDURE — 40000275 ZZH STATISTIC RCP TIME EA 10 MIN

## 2019-08-14 RX ORDER — ZINC OXIDE 20 %
OINTMENT (GRAM) TOPICAL
DISCHARGE
Start: 2019-08-14

## 2019-08-14 RX ADMIN — ATENOLOL 50 MG: 50 TABLET ORAL at 07:59

## 2019-08-14 RX ADMIN — BUDESONIDE 0.5 MG: 0.5 INHALANT RESPIRATORY (INHALATION) at 07:52

## 2019-08-14 ASSESSMENT — ACTIVITIES OF DAILY LIVING (ADL)
ADLS_ACUITY_SCORE: 17

## 2019-08-14 ASSESSMENT — MIFFLIN-ST. JEOR: SCORE: 873.38

## 2019-08-14 NOTE — DISCHARGE SUMMARY
[facility]  Discharge Summary  Hospitalist    Date of Admission:  8/4/2019  Date of Discharge:  8/14/2019  2:15 PM  Provider:  Garret Agrawal DO, [facility]    Discharge Diagnoses   1.  SBO with small bowel ischemia s/p lysis of adhesions and resection of portion small bowel 8/7/19  2.  Mild acute on chronic blood loss anemia associated with surgery  3.  UTI  4.  Deconditioning    Other medical issues:  Past Medical History:   Diagnosis Date     Anxiety      Benign neoplasm of colon      Carotid stenosis, asymptomatic      COPD (chronic obstructive pulmonary disease) (H)      Corns and callosities      Dermatophytosis of nail      Essential hypertension      History of atrial fibrillation      Hyperlipidemia      Hyperlipidemia      Insomnia      Irritable bowel syndrome      Nocturia      Plantar fascial fibromatosis      Primary localized osteoarthrosis of the hip      Pulmonary hypertension (H)      Sprain of lumbar region      Vitamin D deficiency      VSD (ventricular septal defect and aortic arch hypoplasia        History of Present Illness   Melissa Alfaro is an 85 year old female who presented with abdominal discomfort/distension.  Please see the admission history and physical for full details.    Hospital Course   Melissa Alfaro was admitted on 8/4/2019.  The following problems were addressed during her hospitalization:    Ms. Alfaro presented with abdominal complaints.  She was found ultimately to have a bowel obstruction.  She did not improve with conservative management and the surgical team ultimately performed a lysis of adhesions and resection of a portion of small bowel on 8/7/19 (see operative report for full details).  She was continued on antibiotics post-op for several days with the ischemia noted at time of surgery thought there was not felt a perforation.  She gradually improved and tolerated a slow diet advance.  She had loose stool issues at times after surgery.  C. Diff was checked  "and negative.  The stooling was improving by discharge and felt a combination of resumption of diet and the recent antibiotics with side effect.  By discharge she was eating well and feeling much better.  Stooling was slowing and becoming more firm.  She requires surgical follow-up as noted below.  Also during her stay she was noted early on to have a UTI.  I don't believe this represented pyelonephritis.  She had some small pleural effusions and ascites when acutely obstructed.  She was breathing well toward discharge with clear sounding lungs. She appeared to mobilize the fluid off well once her acute abdominal issue was treated.  UTI was treated during her stay and she completed antibiotics before discharge.  Please see the medical record for further details of her stay.  She felt well day of discharge and was comfortable with the plan.      Significant Results and Procedures   Exploratory laparotomy, lysis of adhesions, small bowel resection    Pending Results     Unresulted Labs Ordered in the Past 30 Days of this Admission     No orders found from 7/5/2019 to 8/5/2019.          Code Status   Full Code       Primary Care Physician   Sudheer Tariq    Blood pressure 115/50, pulse 75, temperature 97.6  F (36.4  C), temperature source Oral, resp. rate 16, height 1.549 m (5' 1\"), weight 49.1 kg (108 lb 3.9 oz), SpO2 97 %.    Alert, oriented.  Heart regular, lungs clear, abdomen with active bowel sounds.    Discharge Disposition   Rehab/TCU    Consultations This Hospital Stay   SURGERY GENERAL IP CONSULT  CARE TRANSITION RN/SW IP CONSULT  PHYSICAL THERAPY ADULT IP CONSULT  OCCUPATIONAL THERAPY ADULT IP CONSULT  CARDIOLOGY IP CONSULT  PHYSICAL THERAPY ADULT IP CONSULT  OCCUPATIONAL THERAPY ADULT IP CONSULT    Time Spent on this Encounter   I, Garret Agrawal, personally saw the patient today and spent greater than 30 minutes discharging this patient.    Discharge Orders      Follow-Up with Cardiologist    "   Follow-up and recommended labs and tests     1.  Follow-up with Dr. Valente , [facility], [id number], [place] [address], within 1 week for staple removal and post op check.  Call for appointment at [phone].  2.  Follow-up with care center provider as soon as can arrange.     General info for SNF    Length of Stay Estimate: Short Term Care: Estimated # of Days <30  Condition at Discharge: Improving  Level of care:skilled   Rehabilitation Potential: Good  Admission H&P remains valid and up-to-date: Yes  Recent Chemotherapy: N/A  Use Nursing Home Standing Orders: Yes, but need review by care center provider when they see the patient.     Mantoux instructions    Give two-step Mantoux (PPD) Per Facility Policy Yes     Reason for your hospital stay    Bowel obstruction     Intake and output    Every shift     Daily weights    Call Provider for weight gain of more than 2 pounds per day or 5 pounds per week.     Activity - Up with nursing assistance     Full Code     Physical Therapy Adult Consult    Evaluate and treat as clinically indicated.    Reason:  Deconditioning     Occupational Therapy Adult Consult    Evaluate and treat as clinically indicated.    Reason:  Deconditioning     Fall precautions     Diet    Follow this diet upon discharge: Regular     Discharge Medications   Current Discharge Medication List      START taking these medications    Details   zinc oxide (DESITIN) 20 % external ointment Apply topically every hour as needed for irritation To gluteal skin irritation    Associated Diagnoses: Small bowel obstruction (H)         CONTINUE these medications which have NOT CHANGED    Details   ACETAMINOPHEN PO Take 325-650 mg by mouth every 6 hours as needed for pain      albuterol (PROAIR HFA/PROVENTIL HFA/VENTOLIN HFA) 108 (90 Base) MCG/ACT Inhaler Inhale 1-2 puffs into the lungs every 4 hours as needed for shortness of breath / dyspnea or wheezing      amLODIPine (NORVASC) 10 MG  tablet Take 10 mg by mouth daily      arformoterol (BROVANA) 15 MCG/2ML NEBU neb solution Take 15 mcg by nebulization 2 times daily      atenolol (TENORMIN) 50 MG tablet Take 50 mg by mouth daily      budesonide (PULMICORT) 0.5 MG/2ML neb solution Take 0.5 mg by nebulization 2 times daily      glycerin-hypromellose- (ARTIFICIAL TEARS) 0.2-0.2-1 % SOLN ophthalmic solution Place 1 drop into both eyes 4 times daily as needed       tiotropium (SPIRIVA) 18 MCG capsule Inhale 18 mcg into the lungs daily       vitamin D3 (CHOLECALCIFEROL) 39693 units capsule Take 50,000 Units by mouth once a week On Monday.             Allergies   Allergies   Allergen Reactions     No Clinical Screening - See Comments Shortness Of Breath     With some mold spores  Other reaction(s): Other  Heart races with antihistamines     Diphtheria-Tetanus Toxoids      Other reaction(s): Other  Red swollen arm     Epinephrine      Other reaction(s): Other  Heart rate increases & her body shakes & trembles     Penicillin G      Other reaction(s): Other (Specify in Comments)     Data   Recent Labs   Lab 08/13/19  0657 08/12/19  0730 08/11/19  0804 08/10/19  1819   WBC 6.2  --  6.8 8.8   HGB 8.3* 8.4* 9.0* 9.2*   HCT 25.8*  --  27.5* 28.8*   MCV 89  --  89 91     --  261 319       Recent Labs   Lab 08/13/19  0657 08/12/19  1434 08/12/19  0730 08/11/19  1639     --  144 141   POTASSIUM 3.8 3.9 3.2* 3.0*   CHLORIDE 110*  --  110* 104   CO2 26  --  30 32   ANIONGAP 8  --  4 5   GLC 77  --  86 102*   BUN 7  --  8 11   CR 0.87  --  0.77 0.78   GFRESTIMATED 61  --  70 69   GFRESTBLACK 70  --  81 80   LUIS ENRIQUE 8.2*  --  8.1* 8.1*   MAG  --   --  1.6  --      8/4 urine:  50,000 to 100,000 colonies/mL   Streptococcus agalactiae sero group B     Results for orders placed or performed during the hospital encounter of 08/04/19   CT Abdomen Pelvis w Contrast    Narrative    CT ABDOMEN AND PELVIS WITH CONTRAST   8/4/2019 1:34 PM     HISTORY: Abdominal  pain, unspecified.    TECHNIQUE:  CT abdomen and pelvis with 43 mL Isovue-370 IV. Radiation  dose for this scan was reduced using automated exposure control,  adjustment of the mA and/or kV according to patient size, or iterative  reconstruction technique.    COMPARISON: None available.    FINDINGS: Mild subsegmental atelectasis seen in the bilateral lung  bases.    No focal hepatic lesion is seen. Mild nodular contour of the liver. No  intrahepatic biliary ductal dilatation is present. The common bile  duct measures up to 7 mm, likely within the normal range given  patient's age. Gallbladder is distended measuring up to 4.1 cm.    The spleen, adrenal glands, and pancreas are unremarkable. 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  Scattered areas of wedge-shaped hypoattenuation seen bilaterally  spanning from pelvis to cortex measuring up to 1.5 cm on the left to  0.8 cm on the right. A 5.2 cm cyst is noted arising from the midpole  of the right kidney. No significant perirenal stranding is present.    Stomach is moderately distended with fluid and air. Multiple dilated  loops of small bowel seen in the lower abdomen with several also  demonstrating increased wall thickening. A loop in the right lower  abdomen measures up to 3.1 cm in diameter. No discrete transition  point is identified. Multiple colonic diverticula seen in the sigmoid  colon. Mild wall thickening also noted in the ascending colon and  cecum within the right hemiabdomen (series 3, image 34). Mild to  moderate intra-abdominal and pelvic ascites is present.    Scattered atherosclerotic calcification seen in the abdominal aorta.  IVC is partially collapsed. No retroperitoneal or mesenteric  lymphadenopathy is seen.    Urinary bladder is mildly distended. Uterus is unremarkable.  Evaluation of the pelvis is limited due to streak artifact from right  hip replacement. No definite pelvic lymphadenopathy is  seen.    Diffuse osteopenia seen of the bones. Multilevel degenerative changes  present in the spine. Right hip arthroplasty noted.      Impression    IMPRESSION:  1.  Multiple dilated loops of small bowel seen in the lower abdomen  with no discrete transition point seen. Findings suggestive of partial  small bowel obstruction versus less likely ileus. Colonic loops are  not distended.  2.  Wedge-shaped areas of hypoattenuation seen in the horseshoe shaped  kidneys. Although no perinephric stranding is seen surrounding the  kidneys, findings may suggest pyelonephritis. Correlate with  urinalysis.  3.  Mild to moderate intra-abdominal and pelvic ascites. Mild wall  thickening in the ascending colon as well as several loops of small  bowel likely reactive to surrounding ascites. Correlate with clinical  symptoms for enteritis or colitis.    SERVANDO LANDON MD   XR Abdomen 1 View    Narrative    ABDOMEN ONE VIEW 8/5/2019 12:43 PM     HISTORY: Evaluate NGT placement and interval changes.      Impression    IMPRESSION: Nasogastric tube tip extends to the gastric antrum. There  are multiple loops of gas distended bowel. Although fecal debris is  present within the colon, small bowel is distended out of proportion  to the colon, worrisome for small bowel obstruction. Contrast is  present within the urinary bladder from the recent CT.    SULEMAN BAUM MD   CT Abdomen Pelvis w/o Contrast    Narrative    PROCEDURE:  CT of the abdomen and pelvis without contrast    DATE OF PROCEDURE:  8/7/2019 8:02 AM    CLINICAL HISTORY/INDICATION:  evaluate interval changes, presented with pyelonephritis ? Ileus vs  SBO, high NGT output, received gastrografin 8/6 via NGT.    COMPARISON:  CT 8/4/2019    TECHNIQUE:  CT of the abdomen pelvis was performed without intravenous contrast.  Coronal reformats were performed. Radiation dose for this scan was  reduced using automated exposure control, adjustment of the mA and/or  kV according to  patient size, or iterative reconstruction technique.    DOSE:  DLP: 351 mGy-cm    FINDINGS:  Lower chest:   Interval development of small bilateral, right greater left pleural  effusions with basilar atelectasis. Hiatal hernia.    Abdomen/pelvis:  The enteric tube terminates at the pylorus/proximal duodenum. The  stomach is nondistended. Continued dilatation of numerous small bowel  loops, not significantly changed. Dilute oral contrast is present  throughout the distal small bowel. Hyperdense stool is present within  the nondilated colon, it is unclear if this is a remnant from the  prior exam has during CT 8/4/2019 there was hyperdense stool within  the colon. No hydronephrosis within the horseshoe kidney. Worsening  small volume abdominal ascites most significant in the deep pelvis and  perihepatic regions.      Impression    IMPRESSION:  1.  Continued partial small bowel obstruction versus ileus. Dilute  contrast is present within the fluid-filled distal small bowel.  Hyperdense stool is present within the decompressed colon.  2.  Worsening small volume ascites.  3.  New, bilateral right greater than left pleural effusions.    BONIFACIO MENDOZA MD   XR Chest 2 Views    Narrative    CHEST TWO VIEWS  8/9/2019 10:00 AM     HISTORY:  Hypoxemia.    COMPARISON: None.    FINDINGS: Small bilateral pleural effusions. The lungs are  hyperexpanded. No pneumothorax or airspace consolidation. Heart size  normal. A nasogastric tube extends into the stomach.       Impression    IMPRESSION: No radiographic evidence of acute chest abnormality.     ROE BURNHAM MD

## 2019-08-14 NOTE — PLAN OF CARE
Pt is disoriented to place and time, VSS, on RA, denies pain. Midline ABD incision is closed with staples, GRABIEL, is CDI. LS clear, BS+ audible and active, flatus+, voiding adequately. Pt has not had any loose stools overnight. Pt is up with assistx1, on regular diet, denies N/V. Tele: NSR with PACs. Pt likely to discharge to TCU today. C.Diff test came back negative.

## 2019-08-14 NOTE — PLAN OF CARE
"Discharge Planner PT   Patient plan for discharge: Per pt and chart, TCU today    Current status: Pt somewhat impulsive, forgetful, frequently forgets walker and attempts to \"furniture walk\". Pt initially agreeable to ambulate halls but then having 3 episodes of diarrhea during session, requesting more time in bathroom. RN updated. Pt requires CGA for sit<>stand to FWW from elevated surface, Lurdes from lower surfaces. Pt ambulated 20'x1 and 10'x1 with FWW and CGA, needs cues for walker safety.    Barriers to return to prior living situation: level of assist, fall risk, impaired safety, alone during the day, stairs, decreased activity tolerance    Recommendations for discharge: TCU    Rationale for recommendations: Pt would benefit from continued PT at TCU to improve functional strength, balance, activity tolerance, safety and IND with functional mobility and reduce falls risk prior to returning home.       Entered by: Sade Wall 08/14/2019 11:59 AM     Physical Therapy Discharge Summary    Reason for therapy discharge:    Discharged to transitional care facility.    Progress towards therapy goal(s). See goals on Care Plan in [facility] electronic health record for goal details.  Goals not met.  Barriers to achieving goals:   discharge from facility.    Therapy recommendation(s):    Continued therapy is recommended.  Rationale/Recommendations:  Pt would benefit from continued PT at TCU to improve functional strength, balance, activity tolerance, safety and IND with functional mobility and reduce falls risk prior to returning home..      "

## 2019-08-14 NOTE — PROGRESS NOTES
Patient feels well, no new complaints.  Exam stable.  Tolerating po intake.  Loose stools slowing.  (C Diff PCR was negative).   Plan for d/c to TCU/rehab today.  Full d/c summary to follow.

## 2019-08-14 NOTE — DISCHARGE INSTRUCTIONS
[facility] - SURGICAL CONSULTANTS  Discharge Instructions: Post-Operative Bowel Surgery    ACTIVITY    Expect to feel tired after your surgery.  This will gradually resolve.      Take frequent, short walks and increase your activity gradually.      Avoid strenuous physical activity or heavy lifting greater than 15 lbs. for 4 weeks.  You may climb stairs.      You may drive without restrictions when you are not using any prescription pain medication and feel comfortable in a car.    You may return to work/school when you are comfortable without any prescription pain medication.    You may wear an abdominal binder for comfort for 2-3 weeks from your surgery.  You can wash the abdominal binder and dry it on low heat in the dryer.    WOUND CARE    You may remove your outer dressing or Band-Aids and shower 48 hours after the surgery.  Pat your incisions dry and leave them open to air.  Re-apply dressing (Band-Aids or gauze/tape) as needed for comfort or drainage.    You may have steri-strips (looks like white tape) or staples at your incisions.  You may peel off the steri-strips 2 weeks after your surgery.  If you have staples, they will be removed at your next office visit.    Do not soak your incisions in a tub or pool for 2 weeks.     Do not apply any lotions, creams, or ointments to your incisions.    A ridge under your incisions is normal and will gradually resolve.    DIET    Stay on a low fiber diet until your follow up appointment.  Avoid heavy, spicy, greasy meals and gas forming foods, such as cabbage, broccoli, and onions.      You may find your appetite to be diminished briefly after surgery.  You may take nutritional supplement shakes if you are able.     Drink plenty of fluids to stay hydrated.    PAIN    Expect some tenderness and discomfort at the incision site(s).  Use the prescribed pain medication at your discretion.  Expect gradual resolution of your pain over several days.    You may  take ibuprofen with food (unless you have been told not to) instead of or in addition to your prescribed pain medication.  If you are taking Norco or Percocet, do not take any additional acetaminophen/APAP/Tylenol.    Do not drink alcohol or drive while you are taking pain medications.    You may apply ice to your incisions in 20 minute intervals as needed for the next 24-48 hours.  After that time, consider switching to heat if you prefer.    EXPECTATIONS    ***Pain medications can cause constipation.  Limit use when possible.  Take over the counter stool softener/stimulant, such as Colace or Senna, 1-2 times a day with plenty of water.  You may take a mild over the counter laxative, such as Miralax or a suppository, as needed.  You may discontinue these medications once you are having regular bowel movements and/or are no longer taking your narcotic pain medication.    For laparoscopic surgery, you may have shoulder or upper back discomfort due to the gas used in surgery.  This is temporary and should resolve in 48-72 hours.  Short, frequent walks may help with this.      RETURN APPOINTMENT    Follow up with your surgeon in ***10-14 days.  Please call our office at [phone] to schedule your appointment.  We are located at [address] [place] [address].    CALL OUR OFFICE [phone] IF YOU HAVE:     Chills or fever above 101  F.    Increased redness, warmth, or drainage at your incisions.    Significant bleeding.    Pain not relieved by your pain medication or rest.    Increasing pain after the first 48 hours.    Any other concerns or questions.    Revised May 2018

## 2019-08-14 NOTE — PROGRESS NOTES
[facility]    General Surgery  Daily Post-Op Note       Assessment and Plan:   Melissa Alfaro is a 85 year old female S/P Procedure(s):  EXPLORATORY LAPAROTOMY, LYSIS OF ADHESION, SMALL BOWEL RESECTION, 7 Days Post-Op    Pain: minimal to none  Bowel: +gas, + loose stool becoming more formed, C-diff was negative  Diet: tolerating regular diet, supplemental shakes  Activity: ambulating with assist  Dispo: ok from surgical standpoint for TCU when bed available        Interval History:   Pt sleeping in bed, easily awakened. No complaints.  Tolerating diet but decreased appetite.             Physical Exam:   Temp: 95.5  F (35.3  C) Temp src: Oral BP: 138/49 Pulse: 75 Heart Rate: 92 Resp: 18 SpO2: 93 % O2 Device: None (Room air)      I/O last 3 completed shifts:  In: 180 [P.O.:180]  Out: -       Constitutional: alert and no distress   Abdomen: Abdomen soft, non-tender. Staples in place - clean/dry/intact        Data   Recent Labs   Lab 08/13/19  0657 08/12/19  1434 08/12/19  0730 08/11/19  1639 08/11/19  0804 08/10/19  1819   WBC 6.2  --   --   --  6.8 8.8   HGB 8.3*  --  8.4*  --  9.0* 9.2*   MCV 89  --   --   --  89 91     --   --   --  261 319     --  144 141  --   --    POTASSIUM 3.8 3.9 3.2* 3.0*  --   --    CHLORIDE 110*  --  110* 104  --   --    CO2 26  --  30 32  --   --    BUN 7  --  8 11  --   --    CR 0.87  --  0.77 0.78  --   --    ANIONGAP 8  --  4 5  --   --    LUIS ENRIQUE 8.2*  --  8.1* 8.1*  --   --    GLC 77  --  86 102*  --   --        Jocelyn Daniel PA-C

## 2019-08-14 NOTE — PLAN OF CARE
OT:  According to  note pt is discharging to TCU at 1330. Pt is schedule at 1400 with OT therefore pt is not appropriate to be seen today.     Occupational Therapy Discharge Summary    Reason for therapy discharge:    Discharged to transitional care facility.    Progress towards therapy goal(s). See goals on Care Plan in [facility] electronic health record for goal details.  Goals not met.  Barriers to achieving goals:   discharge from facility.    Therapy recommendation(s):    Pt would benefit from continued skilled OT services to improve IND and safety with ADLs

## 2019-08-14 NOTE — PLAN OF CARE
Disoriented to time and place. VSS. Lung sounds clear., Bowel sounds hyperactive. Multiple loose stools throughout the day. Stool sample sent to rule out c-diff. Contact precautions initiated. adequate urine output, Midline incision enclosed w/ staples GRABIEL. Ambulates assist x1. Tolerating regular diet. Denies pain. Possible TCU discharge tomorrow. Tele: NSR w/ PVC

## 2019-08-14 NOTE — PROGRESS NOTES
SW:    I: SUMAYA following for discharge planning. Pt is medically appropriate for discharge today, 8/14. SW updated [employer] of new discharge date, will send updated auth letter. SW in contact with [facility] admissions and transport aide will plan to  pt at 1330. SW to send discharge orders when available.    P: Pt to discharge to [facility] on [facility]CU via transport aide at 1330.    ADDENDUM: SUMAYA contacted pt daughter, Loreto, to update on discharge plan for today. Loreto was going to contact her brother (pt son) to determine if he is able to leave work to be available at discharge. SUMAYA discussed that family can be present for discharge or visit pt once over at facility, depending on what they desire. Loreto will contact nurses station to either confirm that current time works or request a different discharge time today.    AN Martinez, Dorothe[facility] Dix Psychiatric CenterSW  Daytime (8:00am-4:30pm): [phone]  After-Hours SW Pager (4:30pm-11:30pm): [phone]

## 2019-08-15 ENCOUNTER — TELEPHONE (OUTPATIENT)
Dept: CARDIOLOGY | Facility: CLINIC | Age: 84
End: 2019-08-15

## 2019-08-15 ENCOUNTER — DOCUMENTATION ONLY (OUTPATIENT)
Dept: OTHER | Facility: CLINIC | Age: 84
End: 2019-08-15

## 2019-08-15 ENCOUNTER — NURSING HOME VISIT (OUTPATIENT)
Dept: GERIATRICS | Facility: CLINIC | Age: 84
End: 2019-08-15
Payer: COMMERCIAL

## 2019-08-15 VITALS
OXYGEN SATURATION: 93 % | WEIGHT: 96 LBS | RESPIRATION RATE: 18 BRPM | SYSTOLIC BLOOD PRESSURE: 123 MMHG | HEART RATE: 63 BPM | TEMPERATURE: 99.2 F | DIASTOLIC BLOOD PRESSURE: 54 MMHG | HEIGHT: 62 IN | BODY MASS INDEX: 17.66 KG/M2

## 2019-08-15 DIAGNOSIS — R41.89 COGNITIVE IMPAIRMENT: ICD-10-CM

## 2019-08-15 DIAGNOSIS — I50.32 CHRONIC DIASTOLIC CONGESTIVE HEART FAILURE (H): ICD-10-CM

## 2019-08-15 DIAGNOSIS — K56.609 SMALL BOWEL OBSTRUCTION (H): Primary | ICD-10-CM

## 2019-08-15 DIAGNOSIS — I10 BENIGN ESSENTIAL HTN: ICD-10-CM

## 2019-08-15 DIAGNOSIS — R53.81 PHYSICAL DECONDITIONING: ICD-10-CM

## 2019-08-15 DIAGNOSIS — Z71.89 ADVANCED DIRECTIVES, COUNSELING/DISCUSSION: ICD-10-CM

## 2019-08-15 DIAGNOSIS — J44.9 CHRONIC OBSTRUCTIVE PULMONARY DISEASE, UNSPECIFIED COPD TYPE (H): ICD-10-CM

## 2019-08-15 DIAGNOSIS — Z98.890 S/P EXPLORATORY LAPAROTOMY: ICD-10-CM

## 2019-08-15 DIAGNOSIS — J44.9 PULMONARY HYPERTENSION DUE TO CHRONIC OBSTRUCTIVE PULMONARY DISEASE (H): ICD-10-CM

## 2019-08-15 DIAGNOSIS — I48.0 PAROXYSMAL ATRIAL FIBRILLATION (H): ICD-10-CM

## 2019-08-15 DIAGNOSIS — I27.23 PULMONARY HYPERTENSION DUE TO CHRONIC OBSTRUCTIVE PULMONARY DISEASE (H): ICD-10-CM

## 2019-08-15 DIAGNOSIS — N39.0 URINARY TRACT INFECTION WITHOUT HEMATURIA, SITE UNSPECIFIED: ICD-10-CM

## 2019-08-15 DIAGNOSIS — F41.9 ANXIETY: ICD-10-CM

## 2019-08-15 DIAGNOSIS — D62 ANEMIA DUE TO BLOOD LOSS, ACUTE: ICD-10-CM

## 2019-08-15 PROCEDURE — 99207 ZZC NO CHARGE LOS: CPT | Performed by: NURSE PRACTITIONER

## 2019-08-15 PROCEDURE — 99309 SBSQ NF CARE MODERATE MDM 30: CPT | Performed by: NURSE PRACTITIONER

## 2019-08-15 ASSESSMENT — MIFFLIN-ST. JEOR: SCORE: 833.7

## 2019-08-15 NOTE — TELEPHONE ENCOUNTER
Pablo Foote,    We can hold off on the monitor for now. Dr. Llanes wanted to have her follow up in clinic to discuss anticoagulation further. Ideally we'd have her see Dr. Llanes, but if he doesn't have availability in the next 2 weeks we can have her see an CHASE.    Thank you!    Royal

## 2019-08-15 NOTE — TELEPHONE ENCOUNTER
Patient was evaluated by cardiology while inpatient for post operative episode of PAF. Pt is s/p SBO release of adhesions and resection on 8/7/19. Pt has PMH of PAF, but had not been on OAC for unknown reason. Cardiology plan of care as below. Spontaneously converted to NSR during admission. Will route this message to Royal Mittal NP, to determine if cardiac event monitor should be ordered prior to cardiology f/u OV. [facility] up plan for patient with Care Coordinator.MANUELITO Klein RN.    Plan:  1. Continue current plan with IV metoprolol. Can continue PTA atenolol 50 mg PO daily for rate control once able to tolerate PO.  2. Could consider a Zio patch or Holter monitor at discharge to assess her overall burden of A.Fib and adequacy of rate control once her nutrition is improved and electrolytes are stabilized.  3. Recommend follow up in clinic to discuss the option of starting anticoagulation depending on her continued A.Fib burden given her high [id number] score.     Thank you for the opportunity to participate in this pleasant patient's care.      Donald Mittal NP  Text Page  (8am - 5pm, M-F)

## 2019-08-15 NOTE — LETTER
8/15/2019        RE: Melissa Alfaro  [address] [facility] [facility] [address] [facility] [address]        [facility]  PRIMARY CARE PROVIDER AND CLINIC:  [facility] [address] [facility] [address]  Chief Complaint   Patient presents with     Hospital F/U     [place] [facility] Record Number:  [id number]  Place of Service where encounter took place:  [facility] ([facility]) [[id number]]    Melissa Alfaro  is a 85 year old  (12/3/1933), admitted to the above facility from  [facility]. Hospital stay 8/4/2019 through 8/14/2019..  Admitted to this facility for  rehab, medical management and nursing care.    HPI:    HPI information obtained from: facility chart records, facility staff, patient report and [facility] chart review.   Brief Summary of Hospital Course:   She has a medical history significant for COPD,pulmonary hypertension, HTN, IBS, paroxysmal afib, diastolic CHF,  anxiety, and was hospitalized after presenting to the ED with abdominal pain and nausea. In the ED: WBC 28.0, creatinine 1.28. CT showed multiple dilated loops of small bowel suggestive of partial small bowel obstruction vs ileus, mild to moderate ascites and possible pyelonephritis.  She failed conservative treatment and subsequently underwent exp lap with lysis of adhesions and partial small bowel resection  8/7/2019 by Dr Valente. She required IV metoprolol and diltiazem drip to manage paroxysmal afib. Atenolol resumed prior to discharge. UC grew Streptococcus agalactiae. She received IV cipro and ertapenem for UTI and possible pyelonephritis. Blood culture no growth. She had loose stools and C diff was negative. At hospital discharge: WBC 6.2, Hgb 8.3, creatinine 0.87.         Updates on Status Since Skilled nursing Admission:      Small bowel obstruction (H)-fatigued with poor activity endurance. Has had 1 loose stool today. Poor appetite. Denies nausea or vomiting. Minimal  surgical pain.   S/P exploratory laparotomy  Paroxysmal atrial fibrillation (H)-HR: 63-87  Chronic obstructive pulmonary disease, unspecified COPD type (H)-feels that her breathing is close to baseline.  Mild shortness of breath with activity, no cough or chest pain.   Pulmonary hypertension due to chronic obstructive pulmonary disease (H)  Chronic diastolic congestive heart failure (H)  Urinary tract infection without hematuria, site unspecified-denies urinary symptoms. Afebrile.   Anemia due to blood loss, acute  Benign essential HTN-BPs: 123/54, 169/65, 147/73, 139/55  Anxiety  Cognitive impairment-good historian. Conversation is appropriate. Mild deficits with CPT 4.9/5.6 during tcu stay 5/2018.   Physical deconditioning-ambulating short distances with walker and assist. Requires assist of 1 with cares.       CODE STATUS/ADVANCE DIRECTIVES DISCUSSION:   CPR/Full code   Patient's living condition: lives alone in a house in [facility]. Recently staying with her daughter in the [facility] area and is considering moving.  Independent with cares and med management at baseline.   ALLERGIES: No clinical screening - see comments; Diphtheria-tetanus toxoids; Epinephrine; and Penicillin g  PAST MEDICAL HISTORY:  has a past medical history of Anxiety, Benign neoplasm of colon, Carotid stenosis, asymptomatic, COPD (chronic obstructive pulmonary disease) (H), Corns and callosities, Dermatophytosis of nail, Essential hypertension, History of atrial fibrillation, Hyperlipidemia, Hyperlipidemia, Insomnia, Irritable bowel syndrome, Nocturia, Plantar fascial fibromatosis, Primary localized osteoarthrosis of the hip, Pulmonary hypertension (H), Sprain of lumbar region, Vitamin D deficiency, and VSD (ventricular septal defect and aortic arch hypoplasia.  PAST SURGICAL HISTORY:   has a past surgical history that includes TOTAL HIP ARTHROPLASTY; appendectomy; and Colectomy without colostomy (N/A, 8/7/2019).  FAMILY HISTORY: family  "history includes Cancer in her father; Cerebrovascular Disease in her mother; Chronic Obstructive Pulmonary Disease in her mother; Heart Disease in her father; Lung Cancer in her brother; Osteoporosis in her sister; Seizure Disorder in her father.  SOCIAL HISTORY:       Post Discharge Medication Reconciliation Status: discharge medications reconciled, continue medications without change    Current Outpatient Medications   Medication Sig Dispense Refill     ACETAMINOPHEN PO Take 325-650 mg by mouth every 6 hours as needed for pain       albuterol (PROAIR HFA/PROVENTIL HFA/VENTOLIN HFA) 108 (90 Base) MCG/ACT Inhaler Inhale 1-2 puffs into the lungs every 4 hours as needed for shortness of breath / dyspnea or wheezing       amLODIPine (NORVASC) 10 MG tablet Take 10 mg by mouth daily       arformoterol (BROVANA) 15 MCG/2ML NEBU neb solution Take 15 mcg by nebulization 2 times daily       atenolol (TENORMIN) 50 MG tablet Take 50 mg by mouth daily       budesonide (PULMICORT) 0.5 MG/2ML neb solution Take 0.5 mg by nebulization 2 times daily       glycerin-hypromellose- (ARTIFICIAL TEARS) 0.2-0.2-1 % SOLN ophthalmic solution Place 1 drop into both eyes 4 times daily as needed        tiotropium (SPIRIVA) 18 MCG capsule Inhale 18 mcg into the lungs daily        vitamin D3 (CHOLECALCIFEROL) 55106 units capsule Take 50,000 Units by mouth once a week On Monday.       zinc oxide (DESITIN) 20 % external ointment Apply topically every hour as needed for irritation To gluteal skin irritation         ROS:  10 point ROS of systems including Constitutional, Eyes, Respiratory, Cardiovascular, Gastroenterology, Genitourinary, Integumentary, Musculoskeletal, Psychiatric were all negative except for pertinent positives noted in my HPI.    Vitals:  /54   Pulse 63   Temp 99.2  F (37.3  C)   Resp 18   Ht 1.575 m (5' 2\")   Wt 43.5 kg (96 lb)   SpO2 93%   BMI 17.56 kg/m     Exam:  GENERAL APPEARANCE:  Alert, in no distress, " thin  ENT:  Mouth and posterior oropharynx normal, moist mucous membranes, [place]  EYES:  EOM normal, conjunctiva and lids normal, PERRL  NECK:  No adenopathy,masses or thyromegaly  RESP:  respiratory effort and palpation of chest normal, lungs clear to auscultation , no respiratory distress  CV:  Palpation and auscultation of heart done , regular rate and rhythm, no murmur,  no edema, +2 pedal pulses  ABDOMEN:  normal bowel sounds, soft, nontender, no hepatosplenomegaly or other masses  M/S:   in bed. ALCANTARA with good strength. No joint inflammation  SKIN:  abdominal incision with staples clean, dry, intact, no erythema. No rashes or open areas  PSYCH:  Oriented X 3,  memory impaired , affect and mood normal    Lab/Diagnostic data:  Recent labs in [facility] reviewed by me today.     ASSESSMENT / PLAN:  (K56.609) Small bowel obstruction (H)  (primary encounter diagnosis)  (Z98.890) S/P exploratory laparotomy  Comment: minimal pain. Incision healing without signs of infection. Loose stools improving.   Plan: tylenol prn. Dietician to consult for low fiber diet and food preferences. Repeat C diff testing if stools worsen. Keep incision clean and dry. Surgical follow up 8/27/2019.     (I48.0) Paroxysmal atrial fibrillation (H)  Comment: rate controlled. Rhythm regular today.   Plan: continue atenolol. Monitor VS     (J44.9) Chronic obstructive pulmonary disease, unspecified COPD type (H)  (I27.23,  J44.9) Pulmonary hypertension due to chronic obstructive pulmonary disease (H)  Comment: respiratory status is close to baseline.   Plan: continue albuterol prn, Brovana neb, Pulmicort, Spiriva. Encourage IS. Closely monitor respiratory status.     (I50.32) Chronic diastolic congestive heart failure (H)  Comment: compensated, not on diuretic  Plan: follow weight,symptoms     (N39.0) Urinary tract infection without hematuria, site unspecified  Comment: infection appears resolved  Plan: monitor for recurrent symptoms     (D62) Anemia due  to blood loss, acute  Comment: Hgb 8.3 at hospital discharge. No s/s of active bleeding.   Plan: Hgb, BMP    (I10) Benign essential HTN  Comment: controlled   Plan: continue amlodipine, atenolol. Monitor VS. Avoid hypotension due to advanced age and fall risk.      (F41.9) Anxiety  Comment: chronic. Calm and conversational today  Plan: supportive care. Refer to onsite psychologist prn.     (R41.89) Cognitive impairment  Comment: appears to have mild deficits   Plan: cognitive testing per therapies. Supportive care     (R53.81) Physical deconditioning  Comment: due to acute illness, surgery, multiple comorbidities   Plan: PHYSICAL THERAPY/OT. Disposition unclear at this time, but will most likely return to her daughter's home with home care services.     (Z71.89) Advanced directives, counseling/discussion  Comment: she has a healthcare directive and confirms Full Code  Plan: POLST completed         Total time spent with patient visit at the skilled nursing facility was 39 mins including patient visit and review of past records. Greater than 50% of total time spent with counseling and coordinating care due to coordinating care with facility staff on admission orders, coordinating care with follow up labs and appointments and counseling patient/resident for 20 minutes on: the reason for hospitalization and treatment,  the plan care and  projected length of SNF  stay,  current medications (treatments) reconciled from the hospital and recent  lab and imaging results and subsequent treatment plan. Counseling patient re: advanced directive and completing POLST.     Electronically signed by:  TONY Burciaga CNP                         Sincerely,        TONY Burciaga CNP

## 2019-08-15 NOTE — PROGRESS NOTES
[place] [facility]  PRIMARY CARE PROVIDER AND CLINIC:  Sudheer Tariq [facility] [address] [facility] / [facility] 56*  Chief Complaint   Patient presents with     Hospital F/U     [facility] Record Number:  [id number]  Place of Service where encounter took place:  [facility] ([facility] [[id number]]    Melissa Alfaro  is a 85 year old  (12/3/1933), admitted to the above facility from  [facility]. Hospital stay 8/4/2019 through 8/14/2019..  Admitted to this facility for  rehab, medical management and nursing care.    HPI:    HPI information obtained from: facility chart records, facility staff, patient report and [facility] chart review.   Brief Summary of Hospital Course:   She has a medical history significant for COPD,pulmonary hypertension, HTN, IBS, paroxysmal afib, diastolic CHF,  anxiety, and was hospitalized after presenting to the ED with abdominal pain and nausea. In the ED: WBC 28.0, creatinine 1.28. CT showed multiple dilated loops of small bowel suggestive of partial small bowel obstruction vs ileus, mild to moderate ascites and possible pyelonephritis.  She failed conservative treatment and subsequently underwent exp lap with lysis of adhesions and partial small bowel resection  8/7/2019 by Dr Valente. She required IV metoprolol and diltiazem drip to manage paroxysmal afib. Atenolol resumed prior to discharge. UC grew Streptococcus agalactiae. She received IV cipro and ertapenem for UTI and possible pyelonephritis. Blood culture no growth. She had loose stools and C diff was negative. At hospital discharge: WBC 6.2, Hgb 8.3, creatinine 0.87.         Updates on Status Since Skilled nursing Admission:      Small bowel obstruction (H)-fatigued with poor activity endurance. Has had 1 loose stool today. Poor appetite. Denies nausea or vomiting. Minimal surgical pain.   S/P exploratory laparotomy  Paroxysmal atrial fibrillation (H)-HR:  63-87  Chronic obstructive pulmonary disease, unspecified COPD type (H)-feels that her breathing is close to baseline.  Mild shortness of breath with activity, no cough or chest pain.   Pulmonary hypertension due to chronic obstructive pulmonary disease (H)  Chronic diastolic congestive heart failure (H)  Urinary tract infection without hematuria, site unspecified-denies urinary symptoms. Afebrile.   Anemia due to blood loss, acute  Benign essential HTN-BPs: 123/54, 169/65, 147/73, 139/55  Anxiety  Cognitive impairment-good historian. Conversation is appropriate. Mild deficits with CPT 4.9/5.6 during tcu stay 5/2018.   Physical deconditioning-ambulating short distances with walker and assist. Requires assist of 1 with cares.       CODE STATUS/ADVANCE DIRECTIVES DISCUSSION:   CPR/Full code   Patient's living condition: lives alone in a house in [facility]. Recently staying with her daughter in the [facility] area and is considering moving.  Independent with cares and med management at baseline.   ALLERGIES: No clinical screening - see comments; Diphtheria-tetanus toxoids; Epinephrine; and Penicillin g  PAST MEDICAL HISTORY:  has a past medical history of Anxiety, Benign neoplasm of colon, Carotid stenosis, asymptomatic, COPD (chronic obstructive pulmonary disease) (H), Corns and callosities, Dermatophytosis of nail, Essential hypertension, History of atrial fibrillation, Hyperlipidemia, Hyperlipidemia, Insomnia, Irritable bowel syndrome, Nocturia, Plantar fascial fibromatosis, Primary localized osteoarthrosis of the hip, Pulmonary hypertension (H), Sprain of lumbar region, Vitamin D deficiency, and VSD (ventricular septal defect and aortic arch hypoplasia.  PAST SURGICAL HISTORY:   has a past surgical history that includes TOTAL HIP ARTHROPLASTY; appendectomy; and Colectomy without colostomy (N/A, 8/7/2019).  FAMILY HISTORY: family history includes Cancer in her father; Cerebrovascular Disease in her mother; Chronic  "Obstructive Pulmonary Disease in her mother; Heart Disease in her father; Lung Cancer in her brother; Osteoporosis in her sister; Seizure Disorder in her father.  SOCIAL HISTORY:       Post Discharge Medication Reconciliation Status: discharge medications reconciled, continue medications without change    Current Outpatient Medications   Medication Sig Dispense Refill     ACETAMINOPHEN PO Take 325-650 mg by mouth every 6 hours as needed for pain       albuterol (PROAIR HFA/PROVENTIL HFA/VENTOLIN HFA) 108 (90 Base) MCG/ACT Inhaler Inhale 1-2 puffs into the lungs every 4 hours as needed for shortness of breath / dyspnea or wheezing       amLODIPine (NORVASC) 10 MG tablet Take 10 mg by mouth daily       arformoterol (BROVANA) 15 MCG/2ML NEBU neb solution Take 15 mcg by nebulization 2 times daily       atenolol (TENORMIN) 50 MG tablet Take 50 mg by mouth daily       budesonide (PULMICORT) 0.5 MG/2ML neb solution Take 0.5 mg by nebulization 2 times daily       glycerin-hypromellose- (ARTIFICIAL TEARS) 0.2-0.2-1 % SOLN ophthalmic solution Place 1 drop into both eyes 4 times daily as needed        tiotropium (SPIRIVA) 18 MCG capsule Inhale 18 mcg into the lungs daily        vitamin D3 (CHOLECALCIFEROL) 82912 units capsule Take 50,000 Units by mouth once a week On Monday.       zinc oxide (DESITIN) 20 % external ointment Apply topically every hour as needed for irritation To gluteal skin irritation         ROS:  10 point ROS of systems including Constitutional, Eyes, Respiratory, Cardiovascular, Gastroenterology, Genitourinary, Integumentary, Musculoskeletal, Psychiatric were all negative except for pertinent positives noted in my HPI.    Vitals:  /54   Pulse 63   Temp 99.2  F (37.3  C)   Resp 18   Ht 1.575 m (5' 2\")   Wt 43.5 kg (96 lb)   SpO2 93%   BMI 17.56 kg/m    Exam:  GENERAL APPEARANCE:  Alert, in no distress, thin  ENT:  Mouth and posterior oropharynx normal, moist mucous membranes, [place]  EYES:  " EOM normal, conjunctiva and lids normal, PERRL  NECK:  No adenopathy,masses or thyromegaly  RESP:  respiratory effort and palpation of chest normal, lungs clear to auscultation , no respiratory distress  CV:  Palpation and auscultation of heart done , regular rate and rhythm, no murmur,  no edema, +2 pedal pulses  ABDOMEN:  normal bowel sounds, soft, nontender, no hepatosplenomegaly or other masses  M/S:   in bed. ALCANTARA with good strength. No joint inflammation  SKIN:  abdominal incision with staples clean, dry, intact, no erythema. No rashes or open areas  PSYCH:  Oriented X 3,  memory impaired , affect and mood normal    Lab/Diagnostic data:  Recent labs in [facility] reviewed by me today.     ASSESSMENT / PLAN:  (K56.609) Small bowel obstruction (H)  (primary encounter diagnosis)  (Z98.890) S/P exploratory laparotomy  Comment: minimal pain. Incision healing without signs of infection. Loose stools improving.   Plan: tylenol prn. Dietician to consult for low fiber diet and food preferences. Repeat C diff testing if stools worsen. Keep incision clean and dry. Surgical follow up 8/27/2019.     (I48.0) Paroxysmal atrial fibrillation (H)  Comment: rate controlled. Rhythm regular today.   Plan: continue atenolol. Monitor VS     (J44.9) Chronic obstructive pulmonary disease, unspecified COPD type (H)  (I27.23,  J44.9) Pulmonary hypertension due to chronic obstructive pulmonary disease (H)  Comment: respiratory status is close to baseline.   Plan: continue albuterol prn, Brovana neb, Pulmicort, Spiriva. Encourage IS. Closely monitor respiratory status.     (I50.32) Chronic diastolic congestive heart failure (H)  Comment: compensated, not on diuretic  Plan: follow weight,symptoms     (N39.0) Urinary tract infection without hematuria, site unspecified  Comment: infection appears resolved  Plan: monitor for recurrent symptoms     (D62) Anemia due to blood loss, acute  Comment: Hgb 8.3 at hospital discharge. No s/s of active  bleeding.   Plan: Hgb, BMP    (I10) Benign essential HTN  Comment: controlled   Plan: continue amlodipine, atenolol. Monitor VS. Avoid hypotension due to advanced age and fall risk.      (F41.9) Anxiety  Comment: chronic. Calm and conversational today  Plan: supportive care. Refer to onsite psychologist prn.     (R41.89) Cognitive impairment  Comment: appears to have mild deficits   Plan: cognitive testing per therapies. Supportive care     (R53.81) Physical deconditioning  Comment: due to acute illness, surgery, multiple comorbidities   Plan: PHYSICAL THERAPY/OT. Disposition unclear at this time, but will most likely return to her daughter's home with home care services.     (Z71.89) Advanced directives, counseling/discussion  Comment: she has a healthcare directive and confirms Full Code  Plan: POLST completed         Total time spent with patient visit at the skilled nursing facility was 39 mins including patient visit and review of past records. Greater than 50% of total time spent with counseling and coordinating care due to coordinating care with facility staff on admission orders, coordinating care with follow up labs and appointments and counseling patient/resident for 20 minutes on: the reason for hospitalization and treatment,  the plan care and  projected length of SNF  stay,  current medications (treatments) reconciled from the hospital and recent  lab and imaging results and subsequent treatment plan. Counseling patient re: advanced directive and completing POLST.     Electronically signed by:  TONY Burciaga CNP

## 2019-08-15 NOTE — TELEPHONE ENCOUNTER
Writer called [facility] and spoke with [facility]-phone call transferred to scheduling to schedule f/u OV as ordered. MANUELITO Klein RN.

## 2019-08-16 ENCOUNTER — TRANSFERRED RECORDS (OUTPATIENT)
Dept: HEALTH INFORMATION MANAGEMENT | Facility: CLINIC | Age: 84
End: 2019-08-16

## 2019-08-16 LAB
ANION GAP SERPL CALCULATED.3IONS-SCNC: 10 MMOL/L (ref 3–14)
BUN SERPL-MCNC: 10 MG/DL (ref 7–30)
CALCIUM SERPL-MCNC: 8.9 MG/DL (ref 8.5–10.1)
CHLORIDE SERPLBLD-SCNC: 111 MMOL/L (ref 94–109)
CO2 SERPL-SCNC: 22 MMOL/L (ref 20–32)
CREAT SERPL-MCNC: 0.93 MG/DL (ref 0.52–1.04)
GFR SERPL CREATININE-BSD FRML MDRD: 56 ML/MIN/1.73M2
GLUCOSE SERPL-MCNC: 74 MG/DL (ref 70–99)
HEMOGLOBIN: 9.6 G/DL (ref 11.7–15.7)
POTASSIUM SERPL-SCNC: 3.5 MMOL/L (ref 3.4–5.3)
SODIUM SERPL-SCNC: 143 MMOL/L (ref 133–144)

## 2019-08-19 ENCOUNTER — NURSING HOME VISIT (OUTPATIENT)
Dept: GERIATRICS | Facility: CLINIC | Age: 84
End: 2019-08-19
Payer: COMMERCIAL

## 2019-08-19 VITALS
OXYGEN SATURATION: 95 % | WEIGHT: 88.5 LBS | DIASTOLIC BLOOD PRESSURE: 66 MMHG | HEIGHT: 62 IN | SYSTOLIC BLOOD PRESSURE: 138 MMHG | TEMPERATURE: 97.3 F | RESPIRATION RATE: 18 BRPM | HEART RATE: 84 BPM | BODY MASS INDEX: 16.28 KG/M2

## 2019-08-19 DIAGNOSIS — K56.609 SMALL BOWEL OBSTRUCTION (H): Primary | ICD-10-CM

## 2019-08-19 DIAGNOSIS — I48.0 PAROXYSMAL ATRIAL FIBRILLATION (H): ICD-10-CM

## 2019-08-19 DIAGNOSIS — F41.9 ANXIETY: ICD-10-CM

## 2019-08-19 DIAGNOSIS — I50.32 CHRONIC DIASTOLIC CONGESTIVE HEART FAILURE (H): ICD-10-CM

## 2019-08-19 DIAGNOSIS — N39.0 URINARY TRACT INFECTION WITHOUT HEMATURIA, SITE UNSPECIFIED: ICD-10-CM

## 2019-08-19 DIAGNOSIS — J44.9 PULMONARY HYPERTENSION DUE TO CHRONIC OBSTRUCTIVE PULMONARY DISEASE (H): ICD-10-CM

## 2019-08-19 DIAGNOSIS — D62 ANEMIA DUE TO BLOOD LOSS, ACUTE: ICD-10-CM

## 2019-08-19 DIAGNOSIS — R41.89 COGNITIVE IMPAIRMENT: ICD-10-CM

## 2019-08-19 DIAGNOSIS — I27.23 PULMONARY HYPERTENSION DUE TO CHRONIC OBSTRUCTIVE PULMONARY DISEASE (H): ICD-10-CM

## 2019-08-19 DIAGNOSIS — I10 BENIGN ESSENTIAL HTN: ICD-10-CM

## 2019-08-19 DIAGNOSIS — J44.9 CHRONIC OBSTRUCTIVE PULMONARY DISEASE, UNSPECIFIED COPD TYPE (H): ICD-10-CM

## 2019-08-19 DIAGNOSIS — Z98.890 S/P EXPLORATORY LAPAROTOMY: ICD-10-CM

## 2019-08-19 DIAGNOSIS — R53.81 PHYSICAL DECONDITIONING: ICD-10-CM

## 2019-08-19 PROCEDURE — 99309 SBSQ NF CARE MODERATE MDM 30: CPT | Performed by: NURSE PRACTITIONER

## 2019-08-19 ASSESSMENT — MIFFLIN-ST. JEOR: SCORE: 799.68

## 2019-08-19 NOTE — PROGRESS NOTES
"[place] [facility]  [facility] Record Number:  [id number]  Place of Service where encounter took place:  [facility] [[id number]]  Chief Complaint   Patient presents with     RECHECK       HPI:    Melissa Alfaro  is a 85 year old (12/3/1933), who is being seen today for an episodic care visit.  HPI information obtained from: facility chart records, facility staff, patient report and [facility] chart review.   She came to this facility 8/14/2019 for short term rehab and medical management following hospitalization after presenting to the ED 8/4/2019 with abdominal pain and nausea. In the ED: WBC 28.0, creatinine 1.28. CT showed multiple dilated loops of small bowel suggestive of partial small bowel obstruction vs ileus, mild to moderate ascites and possible pyelonephritis.  She failed conservative treatment and subsequently underwent exp lap with lysis of adhesions and partial small bowel resection  8/7/2019 by Dr Valente. She required IV metoprolol and diltiazem drip to manage paroxysmal afib. Atenolol resumed prior to discharge. UC grew Streptococcus agalactiae. She received IV cipro and ertapenem for UTI and possible pyelonephritis. Blood culture no growth. She had loose stools and C diff was negative. At hospital discharge: WBC 6.2, Hgb 8.3, creatinine 0.87.       Today's concerns are:      Small bowel obstruction (H)-reports energy remains poor, but slowly improving.  Appetite is  fair. Weight is down 6-8 lbs from admission. Having loose stools 1-2/day, always after eating. No nausea or vomiting. Mild surgical pain and \"soreness.\"   S/P exploratory laparotomy  Paroxysmal atrial fibrillation (H)-HR: 70-84  Chronic obstructive pulmonary disease, unspecified COPD type (H)  Pulmonary hypertension due to chronic obstructive pulmonary disease (H)  Chronic diastolic congestive heart failure (H)-no cough, shortness of breath or chest pain.  Urinary tract infection without " "hematuria, site unspecified-denies urinary symptoms. Afebrile.   Anemia due to blood loss, acute  Benign essential HTN-BPs: 138/66, 153/60, 145/65  Anxiety  Cognitive impairment-conversation appropriate, but is a little slow to respond to questions today. She recognizes this and thinks it's because she's tired.   Physical deconditioning-ambulating with walker and stand by assist. Requires assist of 1 with cares.     Past Medical and Surgical History reviewed in Epic today.    MEDICATIONS:  Current Outpatient Medications   Medication Sig Dispense Refill     ACETAMINOPHEN PO Take 650 mg by mouth every 6 hours as needed for pain        albuterol (PROAIR HFA/PROVENTIL HFA/VENTOLIN HFA) 108 (90 Base) MCG/ACT Inhaler Inhale 2 puffs into the lungs every 4 hours as needed for shortness of breath / dyspnea or wheezing        amLODIPine (NORVASC) 10 MG tablet Take 10 mg by mouth daily       arformoterol (BROVANA) 15 MCG/2ML NEBU neb solution Take 15 mcg by nebulization 2 times daily       atenolol (TENORMIN) 50 MG tablet Take 50 mg by mouth daily       budesonide (PULMICORT) 0.5 MG/2ML neb solution Take 0.5 mg by nebulization 2 times daily       glycerin-hypromellose- (ARTIFICIAL TEARS) 0.2-0.2-1 % SOLN ophthalmic solution Place 1 drop into both eyes 4 times daily as needed        tiotropium (SPIRIVA) 18 MCG capsule Inhale 18 mcg into the lungs daily        vitamin D3 (CHOLECALCIFEROL) 42366 units capsule Take 50,000 Units by mouth once a week On Monday.       zinc oxide (DESITIN) 20 % external ointment Apply topically every hour as needed for irritation To gluteal skin irritation         REVIEW OF SYSTEMS:  4 point ROS including Respiratory, CV, GI and , other than that noted in the HPI,  is negative    Objective:  /66   Pulse 84   Temp 97.3  F (36.3  C)   Resp 18   Ht 1.575 m (5' 2\")   Wt 40.1 kg (88 lb 8 oz)   SpO2 95%   BMI 16.19 kg/m    Exam:  GENERAL APPEARANCE:  Alert, in no distress, thin  ENT:  " Mouth and posterior oropharynx normal, moist mucous membranes, [place]  EYES:  EOM normal, conjunctiva and lids normal  NECK:  No adenopathy,masses or thyromegaly  RESP:  respiratory effort and palpation of chest normal, lungs clear to auscultation , no respiratory distress  CV:  Palpation and auscultation of heart done , regular rate and rhythm, no murmur,  no edema, +2 pedal pulses  ABDOMEN:  normal bowel sounds, soft, nontender, no hepatosplenomegaly or other masses  M/S:   gait unsteady with walker.  ALCANTARA with good strength. No joint inflammation  SKIN:  abdominal incision with staples clean, dry, intact, no erythema. No rashes or open areas  PSYCH:  Oriented X 3,  memory impaired , affect and mood normal    Labs:   Hemoglobin   Date Value Ref Range Status   08/16/2019 9.6 (A) 11.7 - 15.7 g/dL Final   ]  Last Comprehensive Metabolic Panel:  Sodium   Date Value Ref Range Status   08/16/2019 143 133 - 144 mmol/L Final     Potassium   Date Value Ref Range Status   08/16/2019 3.5 3.4 - 5.3 mmol/L Final     Chloride   Date Value Ref Range Status   08/16/2019 111 (A) 94 - 109 mmol/L Final     Carbon Dioxide   Date Value Ref Range Status   08/16/2019 22 20 - 32 mmol/L Final     Anion Gap   Date Value Ref Range Status   08/16/2019 10 3 - 14 mmol/L Final     Glucose   Date Value Ref Range Status   08/16/2019 74 70 - 99 mg/dL Final     Urea Nitrogen   Date Value Ref Range Status   08/16/2019 10 7 - 30 mg/dL Final     Creatinine   Date Value Ref Range Status   08/16/2019 0.93 0.52 - 1.04 mg/dL Final     GFR Estimate   Date Value Ref Range Status   08/16/2019 56 (A) >60 ml/min/1.73m2 Final     Calcium   Date Value Ref Range Status   08/16/2019 8.9 8.5 - 10.1 mg/dL Final     ASSESSMENT / PLAN:  (K56.609) Small bowel obstruction (H)  (primary encounter diagnosis)  (Z98.890) S/P exploratory laparotomy  Comment: minimal surgical pain. Incision healing without signs of infection. Loose stools area improving. Appetite remains fair and  she's losing weight.   Plan: tylenol prn. Dietician to consult for low fiber diet and weight loss. Repeat C diff testing if stools worsen. Keep incision clean and dry. Surgical follow up 8/27/2019.      (I48.0) Paroxysmal atrial fibrillation (H)  Comment: rate controlled. Rhythm regular today.   Plan: continue atenolol. Monitor VS      (J44.9) Chronic obstructive pulmonary disease, unspecified COPD type (H)  (I27.23,  J44.9) Pulmonary hypertension due to chronic obstructive pulmonary disease (H)  Comment: no acute issues    Plan: continue albuterol prn, Brovana neb, Pulmicort, Spiriva. Encourage IS. Closely monitor respiratory status.      (I50.32) Chronic diastolic congestive heart failure (H)  Comment: compensated, not on diuretic  Plan: follow weight,symptoms      (N39.0) Urinary tract infection without hematuria, site unspecified  Comment: infection appears resolved  Plan: monitor for recurrent symptoms      (D62) Anemia due to blood loss, acute  Comment: Hgb improved.   Plan: Hgb  8/22/2019      (I10) Benign essential HTN  Comment: controlled   Plan: continue amlodipine, atenolol. Monitor VS. Avoid hypotension due to advanced age and fall risk.       (F41.9) Anxiety  Comment: has been calm and cooperative with cares.   Plan: supportive care. Refer to onsite psychologist prn.      (R41.89) Cognitive impairment  Comment: appears to have mild deficits   Plan: cognitive testing per therapies. Supportive care      (R53.81) Physical deconditioning  Comment: slowly progressing in therapies   Plan: continue PHYSICAL THERAPY/OT. Goal is to discharge to her daughter's  home with services.       Electronically signed by:  TONY Burciaga CNP

## 2019-08-19 NOTE — LETTER
"    8/19/2019        RE: Melissa Alfaro  [address] [facility] [facility] [address]        [place] [facility]  [facility] Record Number:  [id number]  Place of Service where encounter took place:  [facility] ([facility] [[id number]]  Chief Complaint   Patient presents with     RECHECK       HPI:    Melissa Alfaro  is a 85 year old (12/3/1933), who is being seen today for an episodic care visit.  HPI information obtained from: facility chart records, facility staff, patient report and [facility] chart review.   She came to this facility 8/14/2019 for short term rehab and medical management following hospitalization after presenting to the ED 8/4/2019 with abdominal pain and nausea. In the ED: WBC 28.0, creatinine 1.28. CT showed multiple dilated loops of small bowel suggestive of partial small bowel obstruction vs ileus, mild to moderate ascites and possible pyelonephritis.  She failed conservative treatment and subsequently underwent exp lap with lysis of adhesions and partial small bowel resection  8/7/2019 by Dr Valente. She required IV metoprolol and diltiazem drip to manage paroxysmal afib. Atenolol resumed prior to discharge. UC grew Streptococcus agalactiae. She received IV cipro and ertapenem for UTI and possible pyelonephritis. Blood culture no growth. She had loose stools and C diff was negative. At hospital discharge: WBC 6.2, Hgb 8.3, creatinine 0.87.       Today's concerns are:      Small bowel obstruction (H)-reports energy remains poor, but slowly improving.  Appetite is  fair. Weight is down 6-8 lbs from admission. Having loose stools 1-2/day, always after eating. No nausea or vomiting. Mild surgical pain and \"soreness.\"   S/P exploratory laparotomy  Paroxysmal atrial fibrillation (H)-HR: 70-84  Chronic obstructive pulmonary disease, unspecified COPD type (H)  Pulmonary hypertension due to chronic obstructive pulmonary disease (H)  Chronic diastolic congestive heart " "failure (H)-no cough, shortness of breath or chest pain.  Urinary tract infection without hematuria, site unspecified-denies urinary symptoms. Afebrile.   Anemia due to blood loss, acute  Benign essential HTN-BPs: 138/66, 153/60, 145/65  Anxiety  Cognitive impairment-conversation appropriate, but is a little slow to respond to questions today. She recognizes this and thinks it's because she's tired.   Physical deconditioning-ambulating with walker and stand by assist. Requires assist of 1 with cares.     Past Medical and Surgical History reviewed in Epic today.    MEDICATIONS:  Current Outpatient Medications   Medication Sig Dispense Refill     ACETAMINOPHEN PO Take 650 mg by mouth every 6 hours as needed for pain        albuterol (PROAIR HFA/PROVENTIL HFA/VENTOLIN HFA) 108 (90 Base) MCG/ACT Inhaler Inhale 2 puffs into the lungs every 4 hours as needed for shortness of breath / dyspnea or wheezing        amLODIPine (NORVASC) 10 MG tablet Take 10 mg by mouth daily       arformoterol (BROVANA) 15 MCG/2ML NEBU neb solution Take 15 mcg by nebulization 2 times daily       atenolol (TENORMIN) 50 MG tablet Take 50 mg by mouth daily       budesonide (PULMICORT) 0.5 MG/2ML neb solution Take 0.5 mg by nebulization 2 times daily       glycerin-hypromellose- (ARTIFICIAL TEARS) 0.2-0.2-1 % SOLN ophthalmic solution Place 1 drop into both eyes 4 times daily as needed        tiotropium (SPIRIVA) 18 MCG capsule Inhale 18 mcg into the lungs daily        vitamin D3 (CHOLECALCIFEROL) 55104 units capsule Take 50,000 Units by mouth once a week On Monday.       zinc oxide (DESITIN) 20 % external ointment Apply topically every hour as needed for irritation To gluteal skin irritation         REVIEW OF SYSTEMS:  4 point ROS including Respiratory, CV, GI and , other than that noted in the HPI,  is negative    Objective:  /66   Pulse 84   Temp 97.3  F (36.3  C)   Resp 18   Ht 1.575 m (5' 2\")   Wt 40.1 kg (88 lb 8 oz)   " SpO2 95%   BMI 16.19 kg/m     Exam:  GENERAL APPEARANCE:  Alert, in no distress, thin  ENT:  Mouth and posterior oropharynx normal, moist mucous membranes, [place]  EYES:  EOM normal, conjunctiva and lids normal  NECK:  No adenopathy,masses or thyromegaly  RESP:  respiratory effort and palpation of chest normal, lungs clear to auscultation , no respiratory distress  CV:  Palpation and auscultation of heart done , regular rate and rhythm, no murmur,  no edema, +2 pedal pulses  ABDOMEN:  normal bowel sounds, soft, nontender, no hepatosplenomegaly or other masses  M/S:    gait unsteady with walker.  ALCANTARA with good strength. No joint inflammation  SKIN:  abdominal incision with staples clean, dry, intact, no erythema. No rashes or open areas  PSYCH:  Oriented X 3,  memory impaired , affect and mood normal    Labs:   Hemoglobin   Date Value Ref Range Status   08/16/2019 9.6 (A) 11.7 - 15.7 g/dL Final   ]  Last Comprehensive Metabolic Panel:  Sodium   Date Value Ref Range Status   08/16/2019 143 133 - 144 mmol/L Final     Potassium   Date Value Ref Range Status   08/16/2019 3.5 3.4 - 5.3 mmol/L Final     Chloride   Date Value Ref Range Status   08/16/2019 111 (A) 94 - 109 mmol/L Final     Carbon Dioxide   Date Value Ref Range Status   08/16/2019 22 20 - 32 mmol/L Final     Anion Gap   Date Value Ref Range Status   08/16/2019 10 3 - 14 mmol/L Final     Glucose   Date Value Ref Range Status   08/16/2019 74 70 - 99 mg/dL Final     Urea Nitrogen   Date Value Ref Range Status   08/16/2019 10 7 - 30 mg/dL Final     Creatinine   Date Value Ref Range Status   08/16/2019 0.93 0.52 - 1.04 mg/dL Final     GFR Estimate   Date Value Ref Range Status   08/16/2019 56 (A) >60 ml/min/1.73m2 Final     Calcium   Date Value Ref Range Status   08/16/2019 8.9 8.5 - 10.1 mg/dL Final     ASSESSMENT / PLAN:  (K56.609) Small bowel obstruction (H)  (primary encounter diagnosis)  (Z98.890) S/P exploratory laparotomy  Comment: minimal  surgical pain.  Incision healing without signs of infection. Loose stools area improving. Appetite remains fair and she's losing weight.   Plan: tylenol prn. Dietician to consult for low fiber diet and weight loss. Repeat C diff testing if stools worsen. Keep incision clean and dry. Surgical follow up 8/27/2019.      (I48.0) Paroxysmal atrial fibrillation (H)  Comment: rate controlled. Rhythm regular today.   Plan: continue atenolol. Monitor VS      (J44.9) Chronic obstructive pulmonary disease, unspecified COPD type (H)  (I27.23,  J44.9) Pulmonary hypertension due to chronic obstructive pulmonary disease (H)  Comment:  no acute issues    Plan: continue albuterol prn, Brovana neb, Pulmicort, Spiriva. Encourage IS. Closely monitor respiratory status.      (I50.32) Chronic diastolic congestive heart failure (H)  Comment: compensated, not on diuretic  Plan: follow weight,symptoms      (N39.0) Urinary tract infection without hematuria, site unspecified  Comment: infection appears resolved  Plan: monitor for recurrent symptoms      (D62) Anemia due to blood loss, acute  Comment: Hgb  improved.   Plan: Hgb   8/22/2019      (I10) Benign essential HTN  Comment: controlled   Plan: continue amlodipine, atenolol. Monitor VS. Avoid hypotension due to advanced age and fall risk.       (F41.9) Anxiety  Comment:  has been calm and cooperative with cares.   Plan: supportive care. Refer to onsite psychologist prn.      (R41.89) Cognitive impairment  Comment: appears to have mild deficits   Plan: cognitive testing per therapies. Supportive care      (R53.81) Physical deconditioning  Comment:  slowly progressing in therapies   Plan:  continue PHYSICAL THERAPY/OT. Goal is to discharge to her daughter's  home with services.       Electronically signed by:  TONY Burciaga CNP               Sincerely,        TONY Burciaga CNP

## 2019-08-20 ENCOUNTER — NURSING HOME VISIT (OUTPATIENT)
Dept: GERIATRICS | Facility: CLINIC | Age: 84
End: 2019-08-20
Payer: COMMERCIAL

## 2019-08-20 VITALS
BODY MASS INDEX: 15.94 KG/M2 | TEMPERATURE: 96.9 F | SYSTOLIC BLOOD PRESSURE: 130 MMHG | HEIGHT: 62 IN | OXYGEN SATURATION: 96 % | WEIGHT: 86.6 LBS | RESPIRATION RATE: 16 BRPM | HEART RATE: 83 BPM | DIASTOLIC BLOOD PRESSURE: 61 MMHG

## 2019-08-20 DIAGNOSIS — J44.9 CHRONIC OBSTRUCTIVE PULMONARY DISEASE, UNSPECIFIED COPD TYPE (H): ICD-10-CM

## 2019-08-20 DIAGNOSIS — K56.609 SMALL BOWEL OBSTRUCTION (H): Primary | ICD-10-CM

## 2019-08-20 DIAGNOSIS — I48.0 PAROXYSMAL ATRIAL FIBRILLATION (H): ICD-10-CM

## 2019-08-20 DIAGNOSIS — D62 ANEMIA DUE TO BLOOD LOSS, ACUTE: ICD-10-CM

## 2019-08-20 DIAGNOSIS — I50.32 CHRONIC HEART FAILURE WITH PRESERVED EJECTION FRACTION (H): ICD-10-CM

## 2019-08-20 DIAGNOSIS — Z87.440 PERSONAL HISTORY OF URINARY TRACT INFECTION: ICD-10-CM

## 2019-08-20 DIAGNOSIS — R41.89 COGNITIVE IMPAIRMENT: ICD-10-CM

## 2019-08-20 PROCEDURE — 99305 1ST NF CARE MODERATE MDM 35: CPT | Mod: AI | Performed by: INTERNAL MEDICINE

## 2019-08-20 ASSESSMENT — MIFFLIN-ST. JEOR: SCORE: 791.07

## 2019-08-20 NOTE — LETTER
"    8/20/2019        RE: Melissa Alfaro  [address] [facility] [address] [facility] [address]        [place] [facility]  PHYSICIAN NOTE    PRIMARY CARE PROVIDER AND CLINIC:  Sudheer Tariq, [facility] [address] [facility]    Chief Complaint   Patient presents with     Hospital F/U     [facility] Medical Record Number:  [id number]  Place of Service where encounter took place:  [facility] ([facility] [[id number]]    Melissa Alfaro is a 85 year old (12/3/1933), admitted to the above facility from  [facility]. Hospital stay 8/4/19 through 8/14/19. Admitted to this facility for  rehab, medical management and nursing care.     HPI:    HPI information obtained from: facility chart records, patient report and [facility] chart review.     Brief summary of hospital course: Melissa presented with abdominal symptoms and ultimately found to have SBO. Had ex lap with lysis of adhesions and resection of small bowel d/t some ischemia from an adhesive band around the small bowel. Not thought to have perforated. Did get antibiotics course. Also treated for UTI (possible pyelonephritis). Seems she had some fluid retention (pleural effusions and \"ascites\") as well as post-op anemia with Hgb down to 8.3. She had afib and then spontaneously converted to NSR. Per notes, has had h/o paroxysmal afib in the past though details not fully known. She recently is staying with her daughter in the [facility] though her home is in [place]. Per outpatient notes, she does have a diagnosis of dementia.     Updates on status since skilled nursing admission: Melissa is seen in her room today. She appreciated my visit. She has a lack of insight into her care plan d/t her dementia. She says \"I'm fine - I don't see any problems\". When I ask her to describe her hospital stay, she only references that she had surgery and points to her abdomen; she cannot give more specifics. She ate most of her lunch " "today from what I can see remaining on her tray. Denies abdominal pain or nausea. Did have some diarrhea with negative C.diff but she now reports her stools are \"soft\" and denies diarrhea. Also denies chest pain or dyspnea. She recognizes term \"afib\" from her distant past and denies ever being on anticoagulation. She also denies bladder trouble and though she was treated for UTI in the hospital she thinks she has not had a UTI for 10 years. She says she quit smoking >30 years ago. She doesn't think she has established with new PCP here in the [facility]. Says her daughter's name is Yeny.     CODE STATUS/ADVANCE DIRECTIVES DISCUSSION:   CPR/Full code   Patient's living condition: lives alone in a house in [facility] [place]. Recently staying with her daughter in the [facility] area and is considering moving.      ALLERGIES: No clinical screening - see comments; Diphtheria-tetanus toxoids; Epinephrine; and Penicillin g    Past Medical History:   Diagnosis Date     Anxiety      Benign neoplasm of colon      Carotid stenosis, asymptomatic      COPD (chronic obstructive pulmonary disease) (H)      Corns and callosities      Dermatophytosis of nail      Essential hypertension      History of atrial fibrillation      Hyperlipidemia      Hyperlipidemia      Insomnia      Irritable bowel syndrome      Nocturia      Plantar fascial fibromatosis      Primary localized osteoarthrosis of the hip      Pulmonary hypertension (H)      Sprain of lumbar region      Vitamin D deficiency      VSD (ventricular septal defect and aortic arch hypoplasia       Past Surgical History:   Procedure Laterality Date     APPENDECTOMY       AS TOTAL HIP ARTHROPLASTY       COLECTOMY WITHOUT COLOSTOMY N/A 8/7/2019    Procedure: EXPLORATORY LAPAROTOMY, LYSIS OF ADHESION, SMALL BOWEL RESECTION;  Surgeon: Avani Valente MD;  Location:  OR     Family History   Problem Relation Age of Onset     Chronic Obstructive Pulmonary Disease Mother      " "Cerebrovascular Disease Mother      Cancer Father      Heart Disease Father      Seizure Disorder Father      Osteoporosis Sister      Lung Cancer Brother      Social History     Tobacco Use     Smoking status: Former Smoker   Substance Use Topics     Alcohol use: Not on file     Drug use: Not on file        Post-discharge medication reconciliation status: No major medication changes in hospital    Current Outpatient Medications   Medication Sig Dispense Refill     ACETAMINOPHEN PO Take 650 mg by mouth every 6 hours as needed for pain        albuterol (PROAIR HFA/PROVENTIL HFA/VENTOLIN HFA) 108 (90 Base) MCG/ACT Inhaler Inhale 2 puffs into the lungs every 4 hours as needed for shortness of breath / dyspnea or wheezing        amLODIPine (NORVASC) 10 MG tablet Take 10 mg by mouth daily       arformoterol (BROVANA) 15 MCG/2ML NEBU neb solution Take 15 mcg by nebulization 2 times daily       atenolol (TENORMIN) 50 MG tablet Take 50 mg by mouth daily       budesonide (PULMICORT) 0.5 MG/2ML neb solution Take 0.5 mg by nebulization 2 times daily       glycerin-hypromellose- (ARTIFICIAL TEARS) 0.2-0.2-1 % SOLN ophthalmic solution Place 1 drop into both eyes 4 times daily as needed        tiotropium (SPIRIVA) 18 MCG capsule Inhale 18 mcg into the lungs daily        vitamin D3 (CHOLECALCIFEROL) 58664 units capsule Take 50,000 Units by mouth once a week On Monday.       zinc oxide (DESITIN) 20 % external ointment Apply topically every hour as needed for irritation To gluteal skin irritation         ROS:  Limited secondary to cognitive impairment but today pt reports as above in HPI    Exam:  /61   Pulse 83   Temp 96.9  F (36.1  C)   Resp 16   Ht 1.575 m (5' 2\")   Wt 39.3 kg (86 lb 9.6 oz)   SpO2 96%   BMI 15.84 kg/m     Vitals reviewed in PCC: Afebrile, 120-160/40-60, 60-80  Alert, pleasant, sweet elderly lady in NAD, casually dressed  No scleral icterus  Moist oral mucosa  HRRR without mrg, no edema  Lungs " "clear though dim throughout, no cough  Abdomen soft, thin, non-tender, +BS, midline abdominal incision c/d/i with staples  Normal speech, no tremor, walks without assist device  Vague historian  Mood euthymic    Lab/Diagnostic data:  Labs done in SNF are in [place] EPIC. Please refer to them using [facility] [employer]Care Everywhere.   Hemoglobin   Date Value Ref Range Status   08/16/2019 9.6 (A) 11.7 - 15.7 g/dL Final       ASSESSMENT/PLAN:  Small bowel obstruction s/p ex lap with lysis of adhesions and resection of section of small bowel d/t ischemia  Completed antibiotics and diet has advanced  Staples still in place with nice healing incision  Negative C.diff testing for loose bowels  Has f/u with surgeon Dr. Valente 8/27  Needs to establish with local PCP    Personal history of urinary tract infection  Completed antibiotics and denies symptoms    Anemia due to blood loss, acute  Trend improving    Paroxysmal atrial fibrillation (H)  Not on anticoagulation; has cardiology f/u scheduled 8/23 to discuss pros/cons  Is in regular rhythm today    Chronic obstructive pulmonary disease, unspecified COPD type (H)  Prior to admission diagnosis in former smoker  Breathing seems comfortable at this time on room air and utilizing PTA medications    Chronic heart failure with preserved ejection fraction (H)  Seems euvolemic  Has f/u with cardiology scheduled 8/23    Cognitive impairment  Outpatient notes include diagnosis of \"Dementia\"  Sounds like she has family support  Occupational therapy testing to include CPT for safe discharge planning     Electronically signed by:  Nalini Farah DO        Sincerely,        Nalini Farah, DO    "

## 2019-08-20 NOTE — PROGRESS NOTES
"[place] [facility]  PHYSICIAN NOTE    PRIMARY CARE PROVIDER AND CLINIC:  Sudheer Tariq, [facility] [address] [facility]    Chief Complaint   Patient presents with     Hospital F/U     [facility] Medical Record Number:  [id number]  Place of Service where encounter took place:  [facility] ([facility] [[id number]]    Melissa Alfaro is a 85 year old (12/3/1933), admitted to the above facility from  [facility]. Hospital stay 8/4/19 through 8/14/19. Admitted to this facility for  rehab, medical management and nursing care.     HPI:    HPI information obtained from: facility chart records, patient report and [facility] chart review.     Brief summary of hospital course: Melissa presented with abdominal symptoms and ultimately found to have SBO. Had ex lap with lysis of adhesions and resection of small bowel d/t some ischemia from an adhesive band around the small bowel. Not thought to have perforated. Did get antibiotics course. Also treated for UTI (possible pyelonephritis). Seems she had some fluid retention (pleural effusions and \"ascites\") as well as post-op anemia with Hgb down to 8.3. She had afib and then spontaneously converted to NSR. Per notes, has had h/o paroxysmal afib in the past though details not fully known. She recently is staying with her daughter in the [facility] though her home is in [place]. Per outpatient notes, she does have a diagnosis of dementia.     Updates on status since skilled nursing admission: Melissa is seen in her room today. She appreciated my visit. She has a lack of insight into her care plan d/t her dementia. She says \"I'm fine - I don't see any problems\". When I ask her to describe her hospital stay, she only references that she had surgery and points to her abdomen; she cannot give more specifics. She ate most of her lunch today from what I can see remaining on her tray. Denies abdominal pain or nausea. Did have " "some diarrhea with negative C.diff but she now reports her stools are \"soft\" and denies diarrhea. Also denies chest pain or dyspnea. She recognizes term \"afib\" from her distant past and denies ever being on anticoagulation. She also denies bladder trouble and though she was treated for UTI in the hospital she thinks she has not had a UTI for 10 years. She says she quit smoking >30 years ago. She doesn't think she has established with new PCP here in the [facility]. Says her daughter's name is Yeny.     CODE STATUS/ADVANCE DIRECTIVES DISCUSSION:   CPR/Full code   Patient's living condition: lives alone in a house in [facility]. Recently staying with her daughter in the [facility] area and is considering moving.      ALLERGIES: No clinical screening - see comments; Diphtheria-tetanus toxoids; Epinephrine; and Penicillin g    Past Medical History:   Diagnosis Date     Anxiety      Benign neoplasm of colon      Carotid stenosis, asymptomatic      COPD (chronic obstructive pulmonary disease) (H)      Corns and callosities      Dermatophytosis of nail      Essential hypertension      History of atrial fibrillation      Hyperlipidemia      Hyperlipidemia      Insomnia      Irritable bowel syndrome      Nocturia      Plantar fascial fibromatosis      Primary localized osteoarthrosis of the hip      Pulmonary hypertension (H)      Sprain of lumbar region      Vitamin D deficiency      VSD (ventricular septal defect and aortic arch hypoplasia       Past Surgical History:   Procedure Laterality Date     APPENDECTOMY       AS TOTAL HIP ARTHROPLASTY       COLECTOMY WITHOUT COLOSTOMY N/A 8/7/2019    Procedure: EXPLORATORY LAPAROTOMY, LYSIS OF ADHESION, SMALL BOWEL RESECTION;  Surgeon: Avani Valente MD;  Location:  OR     Family History   Problem Relation Age of Onset     Chronic Obstructive Pulmonary Disease Mother      Cerebrovascular Disease Mother      Cancer Father      Heart Disease Father      Seizure Disorder Father  " "    Osteoporosis Sister      Lung Cancer Brother      Social History     Tobacco Use     Smoking status: Former Smoker   Substance Use Topics     Alcohol use: Not on file     Drug use: Not on file        Post-discharge medication reconciliation status: No major medication changes in hospital    Current Outpatient Medications   Medication Sig Dispense Refill     ACETAMINOPHEN PO Take 650 mg by mouth every 6 hours as needed for pain        albuterol (PROAIR HFA/PROVENTIL HFA/VENTOLIN HFA) 108 (90 Base) MCG/ACT Inhaler Inhale 2 puffs into the lungs every 4 hours as needed for shortness of breath / dyspnea or wheezing        amLODIPine (NORVASC) 10 MG tablet Take 10 mg by mouth daily       arformoterol (BROVANA) 15 MCG/2ML NEBU neb solution Take 15 mcg by nebulization 2 times daily       atenolol (TENORMIN) 50 MG tablet Take 50 mg by mouth daily       budesonide (PULMICORT) 0.5 MG/2ML neb solution Take 0.5 mg by nebulization 2 times daily       glycerin-hypromellose- (ARTIFICIAL TEARS) 0.2-0.2-1 % SOLN ophthalmic solution Place 1 drop into both eyes 4 times daily as needed        tiotropium (SPIRIVA) 18 MCG capsule Inhale 18 mcg into the lungs daily        vitamin D3 (CHOLECALCIFEROL) 50125 units capsule Take 50,000 Units by mouth once a week On Monday.       zinc oxide (DESITIN) 20 % external ointment Apply topically every hour as needed for irritation To gluteal skin irritation         ROS:  Limited secondary to cognitive impairment but today pt reports as above in HPI    Exam:  /61   Pulse 83   Temp 96.9  F (36.1  C)   Resp 16   Ht 1.575 m (5' 2\")   Wt 39.3 kg (86 lb 9.6 oz)   SpO2 96%   BMI 15.84 kg/m    Vitals reviewed in PCC: Afebrile, 120-160/40-60, 60-80  Alert, pleasant, sweet elderly lady in NAD, casually dressed  No scleral icterus  Moist oral mucosa  HRRR without mrg, no edema  Lungs clear though dim throughout, no cough  Abdomen soft, thin, non-tender, +BS, midline abdominal incision " "c/d/i with staples  Normal speech, no tremor, walks without assist device  Vague historian  Mood euthymic    Lab/Diagnostic data:  Labs done in SNF are in [place] EPIC. Please refer to them using [facility]Care Everywhere.   Hemoglobin   Date Value Ref Range Status   08/16/2019 9.6 (A) 11.7 - 15.7 g/dL Final       ASSESSMENT/PLAN:  Small bowel obstruction s/p ex lap with lysis of adhesions and resection of section of small bowel d/t ischemia  Completed antibiotics and diet has advanced  Staples still in place with nice healing incision  Negative C.diff testing for loose bowels  Has f/u with surgeon Dr. Valente 8/27  Needs to establish with local PCP    Personal history of urinary tract infection  Completed antibiotics and denies symptoms    Anemia due to blood loss, acute  Trend improving    Paroxysmal atrial fibrillation (H)  Not on anticoagulation; has cardiology f/u scheduled 8/23 to discuss pros/cons  Is in regular rhythm today    Chronic obstructive pulmonary disease, unspecified COPD type (H)  Prior to admission diagnosis in former smoker  Breathing seems comfortable at this time on room air and utilizing PTA medications    Chronic heart failure with preserved ejection fraction (H)  Seems euvolemic  Has f/u with cardiology scheduled 8/23    Cognitive impairment  Outpatient notes include diagnosis of \"Dementia\"  Sounds like she has family support  Occupational therapy testing to include CPT for safe discharge planning     Electronically signed by:  Nalini Farah,     "

## 2019-08-21 ENCOUNTER — HOSPITAL LABORATORY (OUTPATIENT)
Dept: OTHER | Facility: CLINIC | Age: 84
End: 2019-08-21

## 2019-08-21 LAB
ANION GAP SERPL CALCULATED.3IONS-SCNC: 7 MMOL/L (ref 3–14)
BUN SERPL-MCNC: 15 MG/DL (ref 7–30)
C DIFF TOX B STL QL: NEGATIVE
CALCIUM SERPL-MCNC: 8.6 MG/DL (ref 8.5–10.1)
CHLORIDE SERPL-SCNC: 115 MMOL/L (ref 94–109)
CO2 SERPL-SCNC: 22 MMOL/L (ref 20–32)
CREAT SERPL-MCNC: 1.02 MG/DL (ref 0.52–1.04)
GFR SERPL CREATININE-BSD FRML MDRD: 50 ML/MIN/{1.73_M2}
GLUCOSE SERPL-MCNC: 77 MG/DL (ref 70–99)
HGB BLD-MCNC: 9.2 G/DL (ref 11.7–15.7)
POTASSIUM SERPL-SCNC: 3.6 MMOL/L (ref 3.4–5.3)
SODIUM SERPL-SCNC: 144 MMOL/L (ref 133–144)
SPECIMEN SOURCE: NORMAL

## 2019-08-22 ENCOUNTER — DISCHARGE SUMMARY NURSING HOME (OUTPATIENT)
Dept: GERIATRICS | Facility: CLINIC | Age: 84
End: 2019-08-22
Payer: COMMERCIAL

## 2019-08-22 VITALS
TEMPERATURE: 98.1 F | WEIGHT: 87 LBS | RESPIRATION RATE: 16 BRPM | OXYGEN SATURATION: 94 % | HEART RATE: 88 BPM | HEIGHT: 62 IN | BODY MASS INDEX: 16.01 KG/M2 | SYSTOLIC BLOOD PRESSURE: 151 MMHG | DIASTOLIC BLOOD PRESSURE: 50 MMHG

## 2019-08-22 DIAGNOSIS — N39.0 URINARY TRACT INFECTION WITHOUT HEMATURIA, SITE UNSPECIFIED: ICD-10-CM

## 2019-08-22 DIAGNOSIS — I50.32 CHRONIC DIASTOLIC CONGESTIVE HEART FAILURE (H): ICD-10-CM

## 2019-08-22 DIAGNOSIS — R41.89 COGNITIVE IMPAIRMENT: ICD-10-CM

## 2019-08-22 DIAGNOSIS — Z98.890 S/P EXPLORATORY LAPAROTOMY: ICD-10-CM

## 2019-08-22 DIAGNOSIS — I48.0 PAROXYSMAL ATRIAL FIBRILLATION (H): ICD-10-CM

## 2019-08-22 DIAGNOSIS — J44.9 CHRONIC OBSTRUCTIVE PULMONARY DISEASE, UNSPECIFIED COPD TYPE (H): ICD-10-CM

## 2019-08-22 DIAGNOSIS — I27.23 PULMONARY HYPERTENSION DUE TO CHRONIC OBSTRUCTIVE PULMONARY DISEASE (H): ICD-10-CM

## 2019-08-22 DIAGNOSIS — D62 ANEMIA DUE TO BLOOD LOSS, ACUTE: ICD-10-CM

## 2019-08-22 DIAGNOSIS — K56.609 SMALL BOWEL OBSTRUCTION (H): Primary | ICD-10-CM

## 2019-08-22 DIAGNOSIS — J44.9 PULMONARY HYPERTENSION DUE TO CHRONIC OBSTRUCTIVE PULMONARY DISEASE (H): ICD-10-CM

## 2019-08-22 DIAGNOSIS — R53.81 PHYSICAL DECONDITIONING: ICD-10-CM

## 2019-08-22 PROCEDURE — 99316 NF DSCHRG MGMT 30 MIN+: CPT | Performed by: NURSE PRACTITIONER

## 2019-08-22 ASSESSMENT — MIFFLIN-ST. JEOR: SCORE: 792.88

## 2019-08-22 NOTE — LETTER
8/22/2019        RE: Melissa Alfaro  [address] [facility] [address]        [place] [facility] DISCHARGE SUMMARY  PATIENT'S NAME: Melissa Alfaro  YOB: 1933  MEDICAL RECORD NUMBER:  [id number]  Place of Service where encounter took place:  [facility] [[id number]]    PRIMARY CARE PROVIDER AND CLINIC RESPONSIBLE AFTER TRANSFER:   Sudheer Tariq [facility] [address] [facility]*    Non-FMG Provider     Transferring providers: TONY Burciaga CNP, Nalini Farah MD  Recent Hospitalization/ED:  [facility] Hospital stay 8/4/2019 to 8/14/2019.  Date of SNF Admission: August / 14 / 2019  Date of SNF (anticipated) Discharge: August / 23 / 2019  Discharged to: with family , daughter  Cognitive Scores: SLUMS: 15/30, CPT 4.4/5.6   DME: Walker    CODE STATUS/ADVANCE DIRECTIVES DISCUSSION:  Full Code     ALLERGIES: No clinical screening - see comments; Diphtheria-tetanus toxoids; Epinephrine; and Penicillin g    DISCHARGE DIAGNOSIS/NURSING FACILITY COURSE:   She came to this facility  for short term rehab and medical management following hospitalization after presenting to the ED with abdominal pain and nausea. She had WBC 28.0, creatinine 1.28. CT showed multiple dilated loops of small bowel suggestive of partial small bowel obstruction vs ileus, mild to moderate ascites and possible pyelonephritis.  She failed conservative treatment and subsequently underwent exp lap with lysis of adhesions and partial small bowel resection  8/7/2019 by Dr Valente. She required IV metoprolol and diltiazem drip to manage paroxysmal afib. Atenolol resumed prior to discharge. UC grew Streptococcus agalactiae. She received IV cipro and ertapenem for UTI and possible pyelonephritis. Blood culture no growth. She had loose stools and C diff was negative. At hospital discharge: WBC 6.2, Hgb 8.3, creatinine 0.87.    She has worked  with PHYSICAL THERAPY and OT with good progress. Ambulating 550 ft with walker and supervision. Independent with toileting and dressing. Requires stand by assist with bathing. TUG 14 seconds, indicating fairly good balance.   ASSESSMENT / PLAN:  (K56.609) Small bowel obstruction (H)  (primary encounter diagnosis)  (Z98.890) S/P exploratory laparotomy  Comment: minimal surgical pain and incision is healing without signs of infection. Continues to have loose stools after eating. No nausea, vomiting or abdominal pain. Repeat C diff 8/21/2019 was negative. Remains on low fiber diet. Weight is down 8 lbs. She's drinking nutritional supplements. She is feeling well and ready to discharge.   Plan: discharge to her daughter's home. Continue tylenol prn. Continue low fiber diet. Follow up appts and home care services as below.     (I48.0) Paroxysmal atrial fibrillation (H)  Comment: rate controlled. Rhythm has been regular  Plan: continue atenolol. She has Cardiology appointment tomorrow to discuss possible anticoagulation. Her son will be taking her to the appointment.     (I50.32) Chronic diastolic congestive heart failure (H)  Comment: compensated, not on diuretic  Plan: follow weight,symptoms     (J44.9) Chronic obstructive pulmonary disease, unspecified COPD type (H)  (I27.23,  J44.9) Pulmonary hypertension due to chronic obstructive pulmonary disease (H)  Comment: respiratory status is at baseline   Plan: continue albuterol prn, Brovana neb, Pulmicort, Spiriva. Encourage IS.     (D62) Anemia due to blood loss, acute  Comment: Hgb improved. No s/s of active bleeding   Plan: follow up labs per PCP    (N39.0) Urinary tract infection without hematuria, site unspecified  Comment: appears resolved   Plan: monitor for recurrent symptoms     (R41.89) Cognitive impairment  Comment: mild to moderate deficits on cognitive testing. Family is involved and her children assist with medical decision making.   Plan: supportive care      (R53.81) Physical deconditioning  Comment: progress in therapies as above  Plan: home therapies for ongoing gait training, strengthening and ADL safety       Past Medical History:  has a past medical history of Anxiety, Benign neoplasm of colon, Carotid stenosis, asymptomatic, COPD (chronic obstructive pulmonary disease) (H), Corns and callosities, Dermatophytosis of nail, Essential hypertension, History of atrial fibrillation, Hyperlipidemia, Hyperlipidemia, Insomnia, Irritable bowel syndrome, Nocturia, Plantar fascial fibromatosis, Primary localized osteoarthrosis of the hip, Pulmonary hypertension (H), Sprain of lumbar region, Vitamin D deficiency, and VSD (ventricular septal defect and aortic arch hypoplasia.    Discharge Medications:  Current Outpatient Medications   Medication Sig Dispense Refill     ACETAMINOPHEN PO Take 650 mg by mouth every 6 hours as needed for pain        albuterol (PROAIR HFA/PROVENTIL HFA/VENTOLIN HFA) 108 (90 Base) MCG/ACT Inhaler Inhale 2 puffs into the lungs every 4 hours as needed for shortness of breath / dyspnea or wheezing        amLODIPine (NORVASC) 10 MG tablet Take 10 mg by mouth daily       arformoterol (BROVANA) 15 MCG/2ML NEBU neb solution Take 15 mcg by nebulization 2 times daily       atenolol (TENORMIN) 50 MG tablet Take 50 mg by mouth daily       budesonide (PULMICORT) 0.5 MG/2ML neb solution Take 0.5 mg by nebulization 2 times daily       glycerin-hypromellose- (ARTIFICIAL TEARS) 0.2-0.2-1 % SOLN ophthalmic solution Place 1 drop into both eyes 4 times daily as needed        tiotropium (SPIRIVA) 18 MCG capsule Inhale 18 mcg into the lungs daily        vitamin D3 (CHOLECALCIFEROL) 36871 units capsule Take 50,000 Units by mouth once a week On Monday.       zinc oxide (DESITIN) 20 % external ointment Apply topically every hour as needed for irritation To gluteal skin irritation         Medication Changes/Rationale:     None     Controlled medications sent with  "patient:   not applicable/none     ROS:   4 point ROS including Respiratory, CV, GI and , other than that noted in the HPI,  is negative    Physical Exam:   Vitals: BP (!) 151/50   Pulse 88   Temp 98.1  F (36.7  C)   Resp 16   Ht 1.575 m (5' 2\")   Wt 39.5 kg (87 lb)   SpO2 94%   BMI 15.91 kg/m     BMI= Body mass index is 15.91 kg/m .  GENERAL APPEARANCE:  Alert, in no distress, thin  ENT:  Mouth and posterior oropharynx normal, moist mucous membranes, [place]  EYES:  EOM normal, conjunctiva and lids normal  NECK:  No adenopathy,masses or thyromegaly  RESP:  respiratory effort and palpation of chest normal, lungs clear to auscultation , no respiratory distress  CV:  Palpation and auscultation of heart done , regular rate and rhythm, no murmur,  no edema, +2 pedal pulses  ABDOMEN:  normal bowel sounds, soft, nontender, no hepatosplenomegaly or other masses  M/S:   gait steady with walker.  ALCANTARA with good strength. No joint inflammation  SKIN:  abdominal incision with staples clean, dry, intact, no erythema. No rashes or open areas  PSYCH:  Oriented X 3,  memory impaired , affect and mood normal    SNF labs:   Hemoglobin   Date Value Ref Range Status   08/21/2019 9.2 (L) 11.7 - 15.7 g/dL Final   ]  Last Comprehensive Metabolic Panel:  Sodium   Date Value Ref Range Status   08/21/2019 144 133 - 144 mmol/L Final     Potassium   Date Value Ref Range Status   08/21/2019 3.6 3.4 - 5.3 mmol/L Final     Chloride   Date Value Ref Range Status   08/21/2019 115 (H) 94 - 109 mmol/L Final     Carbon Dioxide   Date Value Ref Range Status   08/21/2019 22 20 - 32 mmol/L Final     Anion Gap   Date Value Ref Range Status   08/21/2019 7 3 - 14 mmol/L Final     Glucose   Date Value Ref Range Status   08/21/2019 77 70 - 99 mg/dL Final     Urea Nitrogen   Date Value Ref Range Status   08/21/2019 15 7 - 30 mg/dL Final     Creatinine   Date Value Ref Range Status   08/21/2019 1.02 0.52 - 1.04 mg/dL Final     GFR Estimate   Date Value Ref " Range Status   08/21/2019 50 (L) >60 mL/min/[1.73_m2] Final     Comment:     Non  GFR Calc  Starting 12/18/2018, serum creatinine based estimated GFR (eGFR) will be   calculated using the Chronic Kidney Disease Epidemiology Collaboration   (CKD-EPI) equation.       Calcium   Date Value Ref Range Status   08/21/2019 8.6 8.5 - 10.1 mg/dL Final         DISCHARGE PLAN:    Follow up labs: per PCP    Medical Follow Up:      Follow up with primary care provider in 1 week   Follow up with Cardiology 8/23/2019   Follow up with Dr Valente 8/27/2019    MTM referral needed and placed by this provider: No    Current [place] scheduled appointments:  Next 5 appointments (look out 90 days)    Aug 23, 2019  9:15 AM CDT  Return Visit with Mike Llanes MD  [facility] ([facility]) [address] [place] [id number]  [phone] OPT 2          Discharge Services: Home Care:  Occupational Therapy, Physical Therapy, Registered Nurse, Home Health Aide and From:  [facility]    Discharge Instructions Verbalized to Patient at Discharge:     Keep incision clean and dry      TOTAL DISCHARGE TIME:   Greater than 30 minutes  Electronically signed by:  TONY Burciaga CNP                     Sincerely,        TONY Burciaga CNP

## 2019-08-22 NOTE — PROGRESS NOTES
[place] [facility] DISCHARGE SUMMARY  PATIENT'S NAME: Melissa Alfaro  YOB: 1933  MEDICAL RECORD NUMBER:  [id number]  Place of Service where encounter took place[facility] [[id number]]    PRIMARY CARE PROVIDER AND CLINIC RESPONSIBLE AFTER TRANSFER:   Sudheer Tariq [facility] [address] [facility] 56*    Non-FMG Provider     Transferring providers: TONY Burciaga CNP, Nalini Farah MD  Recent Hospitalization/ED:  [facility] Hospital stay 8/4/2019 to 8/14/2019.  Date of SNF Admission: August / 14 / 2019  Date of SNF (anticipated) Discharge: August / 23 / 2019  Discharged to: with family , daughter  Cognitive Scores: SLUMS: 15/30, CPT 4.4/5.6   DME: Walker    CODE STATUS/ADVANCE DIRECTIVES DISCUSSION:  Full Code     ALLERGIES: No clinical screening - see comments; Diphtheria-tetanus toxoids; Epinephrine; and Penicillin g    DISCHARGE DIAGNOSIS/NURSING FACILITY COURSE:   She came to this facility  for short term rehab and medical management following hospitalization after presenting to the ED with abdominal pain and nausea. She had WBC 28.0, creatinine 1.28. CT showed multiple dilated loops of small bowel suggestive of partial small bowel obstruction vs ileus, mild to moderate ascites and possible pyelonephritis.  She failed conservative treatment and subsequently underwent exp lap with lysis of adhesions and partial small bowel resection  8/7/2019 by Dr Valente. She required IV metoprolol and diltiazem drip to manage paroxysmal afib. Atenolol resumed prior to discharge. UC grew Streptococcus agalactiae. She received IV cipro and ertapenem for UTI and possible pyelonephritis. Blood culture no growth. She had loose stools and C diff was negative. At hospital discharge: WBC 6.2, Hgb 8.3, creatinine 0.87.    She has worked with PHYSICAL THERAPY and OT with good progress. Ambulating 550 ft with walker and  supervision. Independent with toileting and dressing. Requires stand by assist with bathing. TUG 14 seconds, indicating fairly good balance.   ASSESSMENT / PLAN:  (K56.609) Small bowel obstruction (H)  (primary encounter diagnosis)  (Z98.890) S/P exploratory laparotomy  Comment: minimal surgical pain and incision is healing without signs of infection. Continues to have loose stools after eating. No nausea, vomiting or abdominal pain. Repeat C diff 8/21/2019 was negative. Remains on low fiber diet. Weight is down 8 lbs. She's drinking nutritional supplements. She is feeling well and ready to discharge.   Plan: discharge to her daughter's home. Continue tylenol prn. Continue low fiber diet. Follow up appts and home care services as below.     (I48.0) Paroxysmal atrial fibrillation (H)  Comment: rate controlled. Rhythm has been regular  Plan: continue atenolol. She has Cardiology appointment tomorrow to discuss possible anticoagulation. Her son will be taking her to the appointment.     (I50.32) Chronic diastolic congestive heart failure (H)  Comment: compensated, not on diuretic  Plan: follow weight,symptoms     (J44.9) Chronic obstructive pulmonary disease, unspecified COPD type (H)  (I27.23,  J44.9) Pulmonary hypertension due to chronic obstructive pulmonary disease (H)  Comment: respiratory status is at baseline   Plan: continue albuterol prn, Brovana neb, Pulmicort, Spiriva. Encourage IS.     (D62) Anemia due to blood loss, acute  Comment: Hgb improved. No s/s of active bleeding   Plan: follow up labs per PCP    (N39.0) Urinary tract infection without hematuria, site unspecified  Comment: appears resolved   Plan: monitor for recurrent symptoms     (R41.89) Cognitive impairment  Comment: mild to moderate deficits on cognitive testing. Family is involved and her children assist with medical decision making.   Plan: supportive care     (R53.81) Physical deconditioning  Comment: progress in therapies as above  Plan:  home therapies for ongoing gait training, strengthening and ADL safety       Past Medical History:  has a past medical history of Anxiety, Benign neoplasm of colon, Carotid stenosis, asymptomatic, COPD (chronic obstructive pulmonary disease) (H), Corns and callosities, Dermatophytosis of nail, Essential hypertension, History of atrial fibrillation, Hyperlipidemia, Hyperlipidemia, Insomnia, Irritable bowel syndrome, Nocturia, Plantar fascial fibromatosis, Primary localized osteoarthrosis of the hip, Pulmonary hypertension (H), Sprain of lumbar region, Vitamin D deficiency, and VSD (ventricular septal defect and aortic arch hypoplasia.    Discharge Medications:  Current Outpatient Medications   Medication Sig Dispense Refill     ACETAMINOPHEN PO Take 650 mg by mouth every 6 hours as needed for pain        albuterol (PROAIR HFA/PROVENTIL HFA/VENTOLIN HFA) 108 (90 Base) MCG/ACT Inhaler Inhale 2 puffs into the lungs every 4 hours as needed for shortness of breath / dyspnea or wheezing        amLODIPine (NORVASC) 10 MG tablet Take 10 mg by mouth daily       arformoterol (BROVANA) 15 MCG/2ML NEBU neb solution Take 15 mcg by nebulization 2 times daily       atenolol (TENORMIN) 50 MG tablet Take 50 mg by mouth daily       budesonide (PULMICORT) 0.5 MG/2ML neb solution Take 0.5 mg by nebulization 2 times daily       glycerin-hypromellose- (ARTIFICIAL TEARS) 0.2-0.2-1 % SOLN ophthalmic solution Place 1 drop into both eyes 4 times daily as needed        tiotropium (SPIRIVA) 18 MCG capsule Inhale 18 mcg into the lungs daily        vitamin D3 (CHOLECALCIFEROL) 99536 units capsule Take 50,000 Units by mouth once a week On Monday.       zinc oxide (DESITIN) 20 % external ointment Apply topically every hour as needed for irritation To gluteal skin irritation         Medication Changes/Rationale:     None     Controlled medications sent with patient:   not applicable/none     ROS:   4 point ROS including Respiratory, CV, GI  "and , other than that noted in the HPI,  is negative    Physical Exam:   Vitals: BP (!) 151/50   Pulse 88   Temp 98.1  F (36.7  C)   Resp 16   Ht 1.575 m (5' 2\")   Wt 39.5 kg (87 lb)   SpO2 94%   BMI 15.91 kg/m    BMI= Body mass index is 15.91 kg/m .  GENERAL APPEARANCE:  Alert, in no distress, thin  ENT:  Mouth and posterior oropharynx normal, moist mucous membranes, [place]  EYES:  EOM normal, conjunctiva and lids normal  NECK:  No adenopathy,masses or thyromegaly  RESP:  respiratory effort and palpation of chest normal, lungs clear to auscultation , no respiratory distress  CV:  Palpation and auscultation of heart done , regular rate and rhythm, no murmur,  no edema, +2 pedal pulses  ABDOMEN:  normal bowel sounds, soft, nontender, no hepatosplenomegaly or other masses  M/S:   gait steady with walker.  ALCANTARA with good strength. No joint inflammation  SKIN:  abdominal incision with staples clean, dry, intact, no erythema. No rashes or open areas  PSYCH:  Oriented X 3,  memory impaired , affect and mood normal    SNF labs:   Hemoglobin   Date Value Ref Range Status   08/21/2019 9.2 (L) 11.7 - 15.7 g/dL Final   ]  Last Comprehensive Metabolic Panel:  Sodium   Date Value Ref Range Status   08/21/2019 144 133 - 144 mmol/L Final     Potassium   Date Value Ref Range Status   08/21/2019 3.6 3.4 - 5.3 mmol/L Final     Chloride   Date Value Ref Range Status   08/21/2019 115 (H) 94 - 109 mmol/L Final     Carbon Dioxide   Date Value Ref Range Status   08/21/2019 22 20 - 32 mmol/L Final     Anion Gap   Date Value Ref Range Status   08/21/2019 7 3 - 14 mmol/L Final     Glucose   Date Value Ref Range Status   08/21/2019 77 70 - 99 mg/dL Final     Urea Nitrogen   Date Value Ref Range Status   08/21/2019 15 7 - 30 mg/dL Final     Creatinine   Date Value Ref Range Status   08/21/2019 1.02 0.52 - 1.04 mg/dL Final     GFR Estimate   Date Value Ref Range Status   08/21/2019 50 (L) >60 mL/min/[1.73_m2] Final     Comment:     Non "  GFR Calc  Starting 12/18/2018, serum creatinine based estimated GFR (eGFR) will be   calculated using the Chronic Kidney Disease Epidemiology Collaboration   (CKD-EPI) equation.       Calcium   Date Value Ref Range Status   08/21/2019 8.6 8.5 - 10.1 mg/dL Final         DISCHARGE PLAN:    Follow up labs: per PCP    Medical Follow Up:      Follow up with primary care provider in 1 week   Follow up with Cardiology 8/23/2019   Follow up with Dr Valente 8/27/2019    MTM referral needed and placed by this provider: No    Current [place] scheduled appointments:  Next 5 appointments (look out 90 days)    Aug 23, 2019  9:15 AM CDT  Return Visit with Mike Llanes MD  [facility] ([facility]) [address] [place] [id number] [phone] OPT 2          Discharge Services: Home Care:  Occupational Therapy, Physical Therapy, Registered Nurse, Home Health Aide and From:  [facility]    Discharge Instructions Verbalized to Patient at Discharge:     Keep incision clean and dry      TOTAL DISCHARGE TIME:   Greater than 30 minutes  Electronically signed by:  TONY Burciaga CNP

## 2019-08-23 ENCOUNTER — OFFICE VISIT (OUTPATIENT)
Dept: CARDIOLOGY | Facility: CLINIC | Age: 84
End: 2019-08-23
Attending: NURSE PRACTITIONER
Payer: COMMERCIAL

## 2019-08-23 ENCOUNTER — HOSPITAL ENCOUNTER (OUTPATIENT)
Dept: CARDIOLOGY | Facility: CLINIC | Age: 84
End: 2019-08-23
Attending: INTERNAL MEDICINE
Payer: COMMERCIAL

## 2019-08-23 ENCOUNTER — HOSPITAL ENCOUNTER (OUTPATIENT)
Dept: CARDIOLOGY | Facility: CLINIC | Age: 84
Discharge: HOME | End: 2019-08-23
Attending: INTERNAL MEDICINE | Admitting: INTERNAL MEDICINE
Payer: COMMERCIAL

## 2019-08-23 VITALS
HEART RATE: 78 BPM | SYSTOLIC BLOOD PRESSURE: 129 MMHG | BODY MASS INDEX: 16.54 KG/M2 | DIASTOLIC BLOOD PRESSURE: 66 MMHG | HEIGHT: 61 IN | WEIGHT: 87.6 LBS

## 2019-08-23 DIAGNOSIS — I48.0 PAROXYSMAL ATRIAL FIBRILLATION (H): ICD-10-CM

## 2019-08-23 PROCEDURE — 99204 OFFICE O/P NEW MOD 45 MIN: CPT | Performed by: INTERNAL MEDICINE

## 2019-08-23 PROCEDURE — 93306 TTE W/DOPPLER COMPLETE: CPT

## 2019-08-23 PROCEDURE — 0296T ZIO PATCH HOLTER ADULT PEDIATRIC GREATER THAN 48 HRS: CPT

## 2019-08-23 PROCEDURE — 0298T ZIO PATCH HOLTER ADULT PEDIATRIC GREATER THAN 48 HRS: CPT | Performed by: INTERNAL MEDICINE

## 2019-08-23 PROCEDURE — 93306 TTE W/DOPPLER COMPLETE: CPT | Mod: 26 | Performed by: INTERNAL MEDICINE

## 2019-08-23 ASSESSMENT — MIFFLIN-ST. JEOR: SCORE: 779.73

## 2019-08-23 NOTE — LETTER
8/23/2019    Sudheer ARMSTRONG [place] [id number] [facility] [id number]    RE: Melissa MCADAMS Dominic       Dear Colleague,    I had the pleasure of seeing Melissa Alfaro in the [facility].    HPI and Plan:   Today I had the pleasure of seeing Melissa Alfaro at [facility] in [place]. She is a pleasant 85 year old patient with a past medical history of COPD, hypertension, paroxysmal atrial fibrillation with chads vas score of 4, hyperlipidemia, initially saw in the hospital on 8/9/2019 for paroxysmal atrial fibrillation.  The patient had presented to the hospital for abdominal pain and underwent small bowel resection for adhesions and ischemia.  In the perioperative.  She was found to be in A. fib with RVR and hence a cardiology consult was called.  During that encounter she was noted to be recovering from surgery and was slow to respond with alert and was not completely alert and oriented during our encounter and hence anticoagulation discussion was deferred.    Today the patient presents with her son.  She has been doing well from the surgery standpoint.  However, her hemoglobin continues to be low.  Most recently it was 9.2 on 8/22/2019.  Prior hemoglobins have been greater than 12.  She also reports that her stool is different in color since undergoing surgery.  She is unable to elaborate on it further.  As far as atrial fibrillation is concerned in the past she was told that she would benefit from anticoagulation.  However, she was never started on it.  She is on a beta-blocker and takes it as prescribed.  She is not symptomatic when in atrial fibrillation.    She had an echocardiogram at an outside center on 4/17/2018 which showed normal biventricular size and function.  EF was 65 to 70% and the left atrium was severely dilated there was mild MR and normal pulmonary pressure.  She also had a stress test in 2013 which was negative for  ischemia.    Assessment plan  1.  Paroxysmal atrial fibrillation with chads vascular score of 4, age, gender, hypertension  2.  Hypertension  3.  Hyperlipidemia  4.  Mild mitral regurgitation  5.  SBO, ischemic bowel status post bowel resection    The patient would benefit from anticoagulation for approximately atrial fibrillation and high chads vascular score.  However her hemoglobin is significantly lower compared to her baseline.  And although I realize this may be secondary to recent surgery I would still like to wait for the hemoglobin to recover before starting her anticoagulation.  I will perform a fecal occult test to make sure she is not losing blood in her stools, due to recent surgery.  In addition I will perform a 14-day ZIO Patch to assess the burden of atrial fibrillation.  I will also perform transthoracic echocardiogram to assess cardiac anatomy.  I will have her come back and see me in clinic in 1 month with all these tests.  In the meanwhile I will continue atenolol at the current dose.    Plan  1.  Transthoracic echocardiogram  2.  ZIO Patch for 14 days  3.  Fecal occult blood test  4.  Follow-up with me in clinic in 1 month    Thank you for allowing me to participate in the care of Melissa Llanes MD  Cardiology    Orders Placed This Encounter   Procedures     Fecal colorectal cancer screen (FIT)     Follow-Up with Cardiologist     Femio Patch Holter Adult Pediatric Greater than 48 hrs     Echocardiogram Complete       Encounter Diagnosis   Name Primary?     Paroxysmal atrial fibrillation (H)        CURRENT MEDICATIONS:  Current Outpatient Medications   Medication Sig Dispense Refill     albuterol (PROAIR HFA/PROVENTIL HFA/VENTOLIN HFA) 108 (90 Base) MCG/ACT Inhaler Inhale 2 puffs into the lungs every 4 hours as needed for shortness of breath / dyspnea or wheezing        amLODIPine (NORVASC) 10 MG tablet Take 10 mg by mouth daily       arformoterol (BROVANA) 15 MCG/2ML NEBU neb  solution Take 15 mcg by nebulization 2 times daily       atenolol (TENORMIN) 50 MG tablet Take 50 mg by mouth daily       budesonide (PULMICORT) 0.5 MG/2ML neb solution Take 0.5 mg by nebulization 2 times daily       tiotropium (SPIRIVA) 18 MCG capsule Inhale 18 mcg into the lungs daily        zinc oxide (DESITIN) 20 % external ointment Apply topically every hour as needed for irritation To gluteal skin irritation       ACETAMINOPHEN PO Take 650 mg by mouth every 6 hours as needed for pain        glycerin-hypromellose- (ARTIFICIAL TEARS) 0.2-0.2-1 % SOLN ophthalmic solution Place 1 drop into both eyes 4 times daily as needed        vitamin D3 (CHOLECALCIFEROL) 09231 units capsule Take 50,000 Units by mouth once a week On Monday.         ALLERGIES     Allergies   Allergen Reactions     No Clinical Screening - See Comments Shortness Of Breath     With some mold spores  Other reaction(s): Other  Heart races with antihistamines     Diphtheria-Tetanus Toxoids      Other reaction(s): Other  Red swollen arm     Epinephrine      Other reaction(s): Other  Heart rate increases & her body shakes & trembles     Penicillin G      Other reaction(s): Other (Specify in Comments)       PAST MEDICAL HISTORY:  Past Medical History:   Diagnosis Date     Anxiety      Benign neoplasm of colon      Carotid stenosis, asymptomatic      COPD (chronic obstructive pulmonary disease) (H)      Corns and callosities      Dermatophytosis of nail      Essential hypertension      History of atrial fibrillation      Hyperlipidemia      Hyperlipidemia      Insomnia      Irritable bowel syndrome      Nocturia      Plantar fascial fibromatosis      Primary localized osteoarthrosis of the hip      Pulmonary hypertension (H)      Sprain of lumbar region      Vitamin D deficiency      VSD (ventricular septal defect and aortic arch hypoplasia        PAST SURGICAL HISTORY:  Past Surgical History:   Procedure Laterality Date     APPENDECTOMY       AS  TOTAL HIP ARTHROPLASTY       COLECTOMY WITHOUT COLOSTOMY N/A 8/7/2019    Procedure: EXPLORATORY LAPAROTOMY, LYSIS OF ADHESION, SMALL BOWEL RESECTION;  Surgeon: Avani Valente MD;  Location:  OR       FAMILY HISTORY:  Family History   Problem Relation Age of Onset     Chronic Obstructive Pulmonary Disease Mother      Cerebrovascular Disease Mother      Cancer Father      Heart Disease Father      Seizure Disorder Father      Osteoporosis Sister      Lung Cancer Brother        SOCIAL HISTORY:  Social History     Socioeconomic History     Marital status: Single     Spouse name: None     Number of children: None     Years of education: None     Highest education level: None   Occupational History     None   Social Needs     Financial resource strain: None     Food insecurity:     Worry: None     Inability: None     Transportation needs:     Medical: None     Non-medical: None   Tobacco Use     Smoking status: Former Smoker   Substance and Sexual Activity     Alcohol use: None     Comment: rarely     Drug use: None     Sexual activity: None   Lifestyle     Physical activity:     Days per week: None     Minutes per session: None     Stress: None   Relationships     Social connections:     Talks on phone: None     Gets together: None     Attends Faith service: None     Active member of club or organization: None     Attends meetings of clubs or organizations: None     Relationship status: None     Intimate partner violence:     Fear of current or ex partner: None     Emotionally abused: None     Physically abused: None     Forced sexual activity: None   Other Topics Concern     Parent/sibling w/ CABG, MI or angioplasty before 65F 55M? Not Asked   Social History Narrative     None       Review of Systems:  Skin:  Negative       Eyes:  Positive for glasses    ENT:  Negative      Respiratory:  Negative       Cardiovascular:  Negative for;palpitations;lightheadedness;dizziness;edema;chest pain;fatigue     "  Gastroenterology: Negative      Genitourinary:  Negative      Musculoskeletal:  Positive for arthritis fingers  Neurologic:  Negative      Psychiatric:  Negative      Heme/Lymph/Imm:  not assessed      Endocrine:  Negative        Physical Exam:  Vitals: /66   Pulse 78   Ht 1.549 m (5' 1\")   Wt 39.7 kg (87 lb 9.6 oz)   BMI 16.55 kg/m     Constitutional: awake, alert, no distress  Skin: Warm and dry to touch  Head: Normocephalic, atraumatic  Eyes: Conjunctivae and lids unremarkable, sclera white  ENT: No pallor or cyanosis  Respiratory: Normal breath sounds, clear to auscultation  Cardiac: Regular rate and rhythm, S1-S2 normal.  No murmurs gallops or rubs.   No pedal edema.   Extremities and musculoskeletal: No gross motor deficit  Neurological.  Affect normal  Psych: Alert and oriented x3    Recent Lab Results:  LIPID RESULTS:  No results found for: CHOL, HDL, LDL, TRIG, CHOLHDLRATIO    LIVER ENZYME RESULTS:  Lab Results   Component Value Date    AST 22 08/04/2019    ALT 21 08/04/2019       CBC RESULTS:  Lab Results   Component Value Date    WBC 6.2 08/13/2019    RBC 2.89 (L) 08/13/2019    HGB 9.2 (L) 08/21/2019    HCT 25.8 (L) 08/13/2019    MCV 89 08/13/2019    MCH 28.7 08/13/2019    MCHC 32.2 08/13/2019    RDW 14.8 08/13/2019     08/13/2019       BMP RESULTS:  Lab Results   Component Value Date     08/21/2019    POTASSIUM 3.6 08/21/2019    CHLORIDE 115 (H) 08/21/2019    CO2 22 08/21/2019    ANIONGAP 7 08/21/2019    GLC 77 08/21/2019    BUN 15 08/21/2019    CR 1.02 08/21/2019    GFRESTIMATED 50 (L) 08/21/2019    GFRESTBLACK 58 (L) 08/21/2019    LUIS ENRIQUE 8.6 08/21/2019        A1C RESULTS:  No results found for: A1C    INR RESULTS:  No results found for: INR    CC  Donald Mittal, THOMAS  [address] [place] [address]    All medical records were reviewed in detail and discussed with the patient. Greater than 30 mins were spent with the patient, 50% of this time was spent on counseling and " coordination of care.  After visit summary was printed and given to the patient.    Thank you for allowing me to participate in the care of your patient.    Sincerely,     Mike Llanes MD     [facility]

## 2019-08-23 NOTE — PROGRESS NOTES
HPI and Plan:   Today I had the pleasure of seeing Melissa Alfaro at [facility] in [place]. She is a pleasant 85 year old patient with a past medical history of COPD, hypertension, paroxysmal atrial fibrillation with chads vas score of 4, hyperlipidemia, initially saw in the hospital on 8/9/2019 for paroxysmal atrial fibrillation.  The patient had presented to the hospital for abdominal pain and underwent small bowel resection for adhesions and ischemia.  In the perioperative.  She was found to be in A. fib with RVR and hence a cardiology consult was called.  During that encounter she was noted to be recovering from surgery and was slow to respond with alert and was not completely alert and oriented during our encounter and hence anticoagulation discussion was deferred.    Today the patient presents with her son.  She has been doing well from the surgery standpoint.  However, her hemoglobin continues to be low.  Most recently it was 9.2 on 8/22/2019.  Prior hemoglobins have been greater than 12.  She also reports that her stool is different in color since undergoing surgery.  She is unable to elaborate on it further.  As far as atrial fibrillation is concerned in the past she was told that she would benefit from anticoagulation.  However, she was never started on it.  She is on a beta-blocker and takes it as prescribed.  She is not symptomatic when in atrial fibrillation.    She had an echocardiogram at an outside center on 4/17/2018 which showed normal biventricular size and function.  EF was 65 to 70% and the left atrium was severely dilated there was mild MR and normal pulmonary pressure.  She also had a stress test in 2013 which was negative for ischemia.    Assessment plan  1.  Paroxysmal atrial fibrillation with chads vascular score of 4, age, gender, hypertension  2.  Hypertension  3.  Hyperlipidemia  4.  Mild mitral regurgitation  5.  SBO, ischemic bowel status post bowel  resection    The patient would benefit from anticoagulation for approximately atrial fibrillation and high chads vascular score.  However her hemoglobin is significantly lower compared to her baseline.  And although I realize this may be secondary to recent surgery I would still like to wait for the hemoglobin to recover before starting her anticoagulation.  I will perform a fecal occult test to make sure she is not losing blood in her stools, due to recent surgery.  In addition I will perform a 14-day ZIO Patch to assess the burden of atrial fibrillation.  I will also perform transthoracic echocardiogram to assess cardiac anatomy.  I will have her come back and see me in clinic in 1 month with all these tests.  In the meanwhile I will continue atenolol at the current dose.    Plan  1.  Transthoracic echocardiogram  2.  ZIO Patch for 14 days  3.  Fecal occult blood test  4.  Follow-up with me in clinic in 1 month    Thank you for allowing me to participate in the care of Melissa Llanes MD  Cardiology    Orders Placed This Encounter   Procedures     Fecal colorectal cancer screen (FIT)     Follow-Up with Cardiologist     Zio Patch Holter Adult Pediatric Greater than 48 hrs     Echocardiogram Complete       Encounter Diagnosis   Name Primary?     Paroxysmal atrial fibrillation (H)        CURRENT MEDICATIONS:  Current Outpatient Medications   Medication Sig Dispense Refill     albuterol (PROAIR HFA/PROVENTIL HFA/VENTOLIN HFA) 108 (90 Base) MCG/ACT Inhaler Inhale 2 puffs into the lungs every 4 hours as needed for shortness of breath / dyspnea or wheezing        amLODIPine (NORVASC) 10 MG tablet Take 10 mg by mouth daily       arformoterol (BROVANA) 15 MCG/2ML NEBU neb solution Take 15 mcg by nebulization 2 times daily       atenolol (TENORMIN) 50 MG tablet Take 50 mg by mouth daily       budesonide (PULMICORT) 0.5 MG/2ML neb solution Take 0.5 mg by nebulization 2 times daily       tiotropium (SPIRIVA)  18 MCG capsule Inhale 18 mcg into the lungs daily        zinc oxide (DESITIN) 20 % external ointment Apply topically every hour as needed for irritation To gluteal skin irritation       ACETAMINOPHEN PO Take 650 mg by mouth every 6 hours as needed for pain        glycerin-hypromellose- (ARTIFICIAL TEARS) 0.2-0.2-1 % SOLN ophthalmic solution Place 1 drop into both eyes 4 times daily as needed        vitamin D3 (CHOLECALCIFEROL) 86516 units capsule Take 50,000 Units by mouth once a week On Monday.         ALLERGIES     Allergies   Allergen Reactions     No Clinical Screening - See Comments Shortness Of Breath     With some mold spores  Other reaction(s): Other  Heart races with antihistamines     Diphtheria-Tetanus Toxoids      Other reaction(s): Other  Red swollen arm     Epinephrine      Other reaction(s): Other  Heart rate increases & her body shakes & trembles     Penicillin G      Other reaction(s): Other (Specify in Comments)       PAST MEDICAL HISTORY:  Past Medical History:   Diagnosis Date     Anxiety      Benign neoplasm of colon      Carotid stenosis, asymptomatic      COPD (chronic obstructive pulmonary disease) (H)      Corns and callosities      Dermatophytosis of nail      Essential hypertension      History of atrial fibrillation      Hyperlipidemia      Hyperlipidemia      Insomnia      Irritable bowel syndrome      Nocturia      Plantar fascial fibromatosis      Primary localized osteoarthrosis of the hip      Pulmonary hypertension (H)      Sprain of lumbar region      Vitamin D deficiency      VSD (ventricular septal defect and aortic arch hypoplasia        PAST SURGICAL HISTORY:  Past Surgical History:   Procedure Laterality Date     APPENDECTOMY       AS TOTAL HIP ARTHROPLASTY       COLECTOMY WITHOUT COLOSTOMY N/A 8/7/2019    Procedure: EXPLORATORY LAPAROTOMY, LYSIS OF ADHESION, SMALL BOWEL RESECTION;  Surgeon: Avani Valente MD;  Location:  OR       FAMILY HISTORY:  Family History    Problem Relation Age of Onset     Chronic Obstructive Pulmonary Disease Mother      Cerebrovascular Disease Mother      Cancer Father      Heart Disease Father      Seizure Disorder Father      Osteoporosis Sister      Lung Cancer Brother        SOCIAL HISTORY:  Social History     Socioeconomic History     Marital status: Single     Spouse name: None     Number of children: None     Years of education: None     Highest education level: None   Occupational History     None   Social Needs     Financial resource strain: None     Food insecurity:     Worry: None     Inability: None     Transportation needs:     Medical: None     Non-medical: None   Tobacco Use     Smoking status: Former Smoker   Substance and Sexual Activity     Alcohol use: None     Comment: rarely     Drug use: None     Sexual activity: None   Lifestyle     Physical activity:     Days per week: None     Minutes per session: None     Stress: None   Relationships     Social connections:     Talks on phone: None     Gets together: None     Attends Spiritism service: None     Active member of club or organization: None     Attends meetings of clubs or organizations: None     Relationship status: None     Intimate partner violence:     Fear of current or ex partner: None     Emotionally abused: None     Physically abused: None     Forced sexual activity: None   Other Topics Concern     Parent/sibling w/ CABG, MI or angioplasty before 65F 55M? Not Asked   Social History Narrative     None       Review of Systems:  Skin:  Negative       Eyes:  Positive for glasses    ENT:  Negative      Respiratory:  Negative       Cardiovascular:  Negative for;palpitations;lightheadedness;dizziness;edema;chest pain;fatigue      Gastroenterology: Negative      Genitourinary:  Negative      Musculoskeletal:  Positive for arthritis fingers  Neurologic:  Negative      Psychiatric:  Negative      Heme/Lymph/Imm:  not assessed      Endocrine:  Negative        Physical  "Exam:  Vitals: /66   Pulse 78   Ht 1.549 m (5' 1\")   Wt 39.7 kg (87 lb 9.6 oz)   BMI 16.55 kg/m    Constitutional: awake, alert, no distress  Skin: Warm and dry to touch  Head: Normocephalic, atraumatic  Eyes: Conjunctivae and lids unremarkable, sclera white  ENT: No pallor or cyanosis  Respiratory: Normal breath sounds, clear to auscultation  Cardiac: Regular rate and rhythm, S1-S2 normal.  No murmurs gallops or rubs.   No pedal edema.   Extremities and musculoskeletal: No gross motor deficit  Neurological.  Affect normal  Psych: Alert and oriented x3    Recent Lab Results:  LIPID RESULTS:  No results found for: CHOL, HDL, LDL, TRIG, CHOLHDLRATIO    LIVER ENZYME RESULTS:  Lab Results   Component Value Date    AST 22 08/04/2019    ALT 21 08/04/2019       CBC RESULTS:  Lab Results   Component Value Date    WBC 6.2 08/13/2019    RBC 2.89 (L) 08/13/2019    HGB 9.2 (L) 08/21/2019    HCT 25.8 (L) 08/13/2019    MCV 89 08/13/2019    MCH 28.7 08/13/2019    MCHC 32.2 08/13/2019    RDW 14.8 08/13/2019     08/13/2019       BMP RESULTS:  Lab Results   Component Value Date     08/21/2019    POTASSIUM 3.6 08/21/2019    CHLORIDE 115 (H) 08/21/2019    CO2 22 08/21/2019    ANIONGAP 7 08/21/2019    GLC 77 08/21/2019    BUN 15 08/21/2019    CR 1.02 08/21/2019    GFRESTIMATED 50 (L) 08/21/2019    GFRESTBLACK 58 (L) 08/21/2019    LUIS ENRIQUE 8.6 08/21/2019        A1C RESULTS:  No results found for: A1C    INR RESULTS:  No results found for: INR    FARHANA Mittal NP  [address] [address]    All medical records were reviewed in detail and discussed with the patient. Greater than 30 mins were spent with the patient, 50% of this time was spent on counseling and coordination of care.  After visit summary was printed and given to the patient.    "

## 2019-08-25 PROCEDURE — G0180 MD CERTIFICATION HHA PATIENT: HCPCS | Performed by: SURGERY

## 2019-08-27 ENCOUNTER — OFFICE VISIT (OUTPATIENT)
Dept: SURGERY | Facility: CLINIC | Age: 84
End: 2019-08-27
Payer: COMMERCIAL

## 2019-08-27 DIAGNOSIS — Z09 SURGERY FOLLOW-UP EXAMINATION: Primary | ICD-10-CM

## 2019-08-27 PROCEDURE — 99024 POSTOP FOLLOW-UP VISIT: CPT | Performed by: SURGERY

## 2019-08-27 NOTE — PROGRESS NOTES
Surgery Postoperative Note    S: Melissa reports she is doing fine after her abdominal surgery. No abdominal pain. She has intermittent loose stools, most recent was this morning and was yellowish in color. Had been more formed the past few days. Having 1-2 bowel movements per day. No nausea. Doesn't eat much according to her son, she feels she is eating normally. No nausea.     Abdomen: vertical midline incision healing well. Staples removed. No erythema or induration     Pathology:   FINAL DIAGNOSIS:   Small bowel, small bowel resection   - Small bowel with ischemic changes   - Surgical margins viable   - No evidence of dysplasia or malignancy    A/P  Melissa Alfaro is recovering from a small bowel resection due to ischemic small bowel related to adhesive band. She was seen by cardiology last week due to atrial fibrillation in the hospital and plan for TTE, FOBT and possible anticoagulation in the future. hgb noted to be 9.6 at most recent evaluation. I advised her to slowly return to regular activity. I expect she will make a complete recovery without issues. We reviewed her pathology today as well. She will follow up with me prn going forward.     Thank you for the opportunity to help in her care.    Avani Valente MD  [facility]  [phone]    Please route or send letter to:  Primary Care Provider (PCP) and Referring Provider

## 2019-09-10 DIAGNOSIS — Z53.9 DIAGNOSIS NOT YET DEFINED: Primary | ICD-10-CM

## 2019-09-26 ENCOUNTER — OFFICE VISIT (OUTPATIENT)
Dept: CARDIOLOGY | Facility: CLINIC | Age: 84
End: 2019-09-26
Attending: INTERNAL MEDICINE
Payer: COMMERCIAL

## 2019-09-26 VITALS
DIASTOLIC BLOOD PRESSURE: 60 MMHG | SYSTOLIC BLOOD PRESSURE: 124 MMHG | HEIGHT: 61 IN | WEIGHT: 91.9 LBS | HEART RATE: 64 BPM | BODY MASS INDEX: 17.35 KG/M2

## 2019-09-26 DIAGNOSIS — I48.0 PAROXYSMAL ATRIAL FIBRILLATION (H): ICD-10-CM

## 2019-09-26 PROCEDURE — 99214 OFFICE O/P EST MOD 30 MIN: CPT | Mod: 24 | Performed by: INTERNAL MEDICINE

## 2019-09-26 ASSESSMENT — MIFFLIN-ST. JEOR: SCORE: 799.24

## 2019-09-26 NOTE — PROGRESS NOTES
HPI and Plan:   Today I had the pleasure of seeing Melissa Alfaro at [facility] in [place]. She is a pleasant 85 year old patient with a past medical history of COPD, hypertension, paroxysmal atrial fibrillation with chads vas score of 4 and hyperlipidemia who I initially saw in the hospital on 8/9/2019 for paroxysmal atrial fibrillation.  The patient had presented to the hospital for abdominal pain and underwent small bowel resection for adhesions and ischemia.  In the perioperative period she was found to be in A. fib with RVR and hence a cardiology consult was called.  She later converted spontaneously to sinus rhythm.  Given that he was disoriented during the hospital encounter I deferred the discussion about AC to outpatient visit.      When I saw her last time she was still recovering following surgery and her hemoglobin had not completely gone back to normal.  I hence recommended that she complete a work-up for anemia before considering AC.  I also ordered a 2-week ZIO patch which was completed in the interim.  The ZIO Patch showed 50 short runs of SVT the longest of which lasted 10 seconds.  There were no episodes of atrial fibrillation noted on the ZIO Patch.  The patient reports that he has been doing well and feels back to her baseline.  Her last hemoglobin was checked a month ago and was still 9.2.  She has not been seen by a GI physician or the GI surgeon who operated on her.  I discussed with her son that this should be looked into a little more.    She had an echocardiogram at an outside center on 4/17/2018 which showed normal biventricular size and function.  EF was 65 to 70% and the left atrium was moderate-severely dilated.There was mild MR and normal pulmonary pressure.   However, the echocardiogram done in our system on 8/20/2018 showed normal LV size and function with a normal sized left atrium. She also had a stress test in 2013 which was negative for  ischemia.    Assessment plan  1.  Paroxysmal atrial fibrillation with chads vascular score of 4, age, gender, hypertension  2.  Hypertension  3.  Hyperlipidemia  4.  SBO, ischemic bowel status post bowel resection  5. Anemia     At this point given that the patient has no episode of atrial fibrillation on the 2-week ZIO Patch and that I believe that the episode that occurred in the hospital was most likely in the setting of SBO and recent surgery.  Moreover, the echocardiogram showed normal-sized left atrium.  She is currently on a beta-blocker and I will continue that unchanged.  As far as anticoagulation is concerned given that she is in sinus rhythm with no burden of A. fib on the recent ZIO Patch I do not feel very strongly about starting her on a blood thinner.  This is also because she has anemia and her hemoglobin has continued to be low since her abdominal surgery.  The patient and her daughter tells me that they are not very keen on starting anticoagulation but anyways.  I  will see her in a year and repeat cardiac monitoring for 2 weeks at that time.  If found to have atrial fibrillation I will reconsider anti-coagulation.  I have told her to follow-up with her primary care physician and if possible a Gi physician to undergo work-up of anemia.    Plan  1.  Continue current medications  2.  Follow-up with me in 12 months with a ZIO Patch before    Thank you for allowing me to participate in the care of Melissa Llanes MD  Cardiology    Orders Placed This Encounter   Procedures     Follow-Up with Cardiologist       Encounter Diagnosis   Name Primary?     Paroxysmal atrial fibrillation (H)        CURRENT MEDICATIONS:  Current Outpatient Medications   Medication Sig Dispense Refill     ACETAMINOPHEN PO Take 650 mg by mouth every 6 hours as needed for pain        amLODIPine (NORVASC) 10 MG tablet Take 10 mg by mouth daily       arformoterol (BROVANA) 15 MCG/2ML NEBU neb solution Take 15 mcg by  nebulization 2 times daily       atenolol (TENORMIN) 50 MG tablet Take 50 mg by mouth daily       budesonide (PULMICORT) 0.5 MG/2ML neb solution Take 0.5 mg by nebulization 2 times daily       tiotropium (SPIRIVA) 18 MCG capsule Inhale 18 mcg into the lungs daily        vitamin D3 (CHOLECALCIFEROL) 28768 units capsule Take 50,000 Units by mouth once a week On Monday.       albuterol (PROAIR HFA/PROVENTIL HFA/VENTOLIN HFA) 108 (90 Base) MCG/ACT Inhaler Inhale 2 puffs into the lungs every 4 hours as needed for shortness of breath / dyspnea or wheezing        glycerin-hypromellose- (ARTIFICIAL TEARS) 0.2-0.2-1 % SOLN ophthalmic solution Place 1 drop into both eyes 4 times daily as needed        zinc oxide (DESITIN) 20 % external ointment Apply topically every hour as needed for irritation To gluteal skin irritation (Patient not taking: Reported on 9/26/2019)         ALLERGIES     Allergies   Allergen Reactions     No Clinical Screening - See Comments Shortness Of Breath     With some mold spores  Other reaction(s): Other  Heart races with antihistamines     Diphtheria-Tetanus Toxoids      Other reaction(s): Other  Red swollen arm     Epinephrine      Other reaction(s): Other  Heart rate increases & her body shakes & trembles     Penicillin G      Other reaction(s): Other (Specify in Comments)       PAST MEDICAL HISTORY:  Past Medical History:   Diagnosis Date     Anxiety      Benign neoplasm of colon      Carotid stenosis, asymptomatic      COPD (chronic obstructive pulmonary disease) (H)      Corns and callosities      Dermatophytosis of nail      Essential hypertension      History of atrial fibrillation      Hyperlipidemia      Hyperlipidemia      Insomnia      Irritable bowel syndrome      Nocturia      Plantar fascial fibromatosis      Primary localized osteoarthrosis of the hip      Pulmonary hypertension (H)      Sprain of lumbar region      Vitamin D deficiency      VSD (ventricular septal defect and  aortic arch hypoplasia        PAST SURGICAL HISTORY:  Past Surgical History:   Procedure Laterality Date     APPENDECTOMY       AS TOTAL HIP ARTHROPLASTY       COLECTOMY WITHOUT COLOSTOMY N/A 8/7/2019    Procedure: EXPLORATORY LAPAROTOMY, LYSIS OF ADHESION, SMALL BOWEL RESECTION;  Surgeon: Avani Valente MD;  Location:  OR       FAMILY HISTORY:  Family History   Problem Relation Age of Onset     Chronic Obstructive Pulmonary Disease Mother      Cerebrovascular Disease Mother      Cancer Father      Heart Disease Father      Seizure Disorder Father      Osteoporosis Sister      Lung Cancer Brother        SOCIAL HISTORY:  Social History     Socioeconomic History     Marital status: Single     Spouse name: None     Number of children: None     Years of education: None     Highest education level: None   Occupational History     None   Social Needs     Financial resource strain: None     Food insecurity:     Worry: None     Inability: None     Transportation needs:     Medical: None     Non-medical: None   Tobacco Use     Smoking status: Former Smoker     Smokeless tobacco: Never Used   Substance and Sexual Activity     Alcohol use: None     Comment: rarely     Drug use: None     Sexual activity: None   Lifestyle     Physical activity:     Days per week: None     Minutes per session: None     Stress: None   Relationships     Social connections:     Talks on phone: None     Gets together: None     Attends Pentecostal service: None     Active member of club or organization: None     Attends meetings of clubs or organizations: None     Relationship status: None     Intimate partner violence:     Fear of current or ex partner: None     Emotionally abused: None     Physically abused: None     Forced sexual activity: None   Other Topics Concern     Parent/sibling w/ CABG, MI or angioplasty before 65F 55M? Not Asked   Social History Narrative     None       Review of Systems:  Skin:  Negative       Eyes:  Positive for  "glasses    ENT:  Negative      Respiratory:  Negative       Cardiovascular:         Gastroenterology: Negative      Genitourinary:  Negative      Musculoskeletal:  Positive for arthritis fingers  Neurologic:  Negative      Psychiatric:  Negative      Heme/Lymph/Imm:  Negative      Endocrine:  Negative        Physical Exam:  Vitals: /60   Pulse 64   Ht 1.549 m (5' 1\")   Wt 41.7 kg (91 lb 14.4 oz)   BMI 17.36 kg/m    Constitutional: awake, alert, no distress  Skin: Warm and dry to touch  Head: Normocephalic, atraumatic  Eyes: Conjunctivae and lids unremarkable, sclera white  ENT: No pallor or cyanosis  Respiratory: Normal breath sounds, clear to auscultation  Cardiac: Regular rate and rhythm, S1-S2 normal.  No murmurs gallops or rubs.   No pedal edema.   Extremities and musculoskeletal: No gross motor deficit  Neurological.  Affect normal  Psych: Alert and oriented x3    Recent Lab Results:  LIPID RESULTS:  No results found for: CHOL, HDL, LDL, TRIG, CHOLHDLRATIO    LIVER ENZYME RESULTS:  Lab Results   Component Value Date    AST 22 08/04/2019    ALT 21 08/04/2019       CBC RESULTS:  Lab Results   Component Value Date    WBC 6.2 08/13/2019    RBC 2.89 (L) 08/13/2019    HGB 9.2 (L) 08/21/2019    HCT 25.8 (L) 08/13/2019    MCV 89 08/13/2019    MCH 28.7 08/13/2019    MCHC 32.2 08/13/2019    RDW 14.8 08/13/2019     08/13/2019       BMP RESULTS:  Lab Results   Component Value Date     08/21/2019    POTASSIUM 3.6 08/21/2019    CHLORIDE 115 (H) 08/21/2019    CO2 22 08/21/2019    ANIONGAP 7 08/21/2019    GLC 77 08/21/2019    BUN 15 08/21/2019    CR 1.02 08/21/2019    GFRESTIMATED 50 (L) 08/21/2019    GFRESTBLACK 58 (L) 08/21/2019    LUIS ENRIQUE 8.6 08/21/2019        A1C RESULTS:  No results found for: A1C    INR RESULTS:  No results found for: INR    CC  Donald Mittal, NP  [address] [place] [address]    All medical records were reviewed in detail and discussed with the patient. Greater than 30 mins " were spent with the patient, 50% of this time was spent on counseling and coordination of care.  After visit summary was printed and given to the patient.

## 2019-09-26 NOTE — LETTER
9/26/2019    Sudheer ARMSTRONG [place] [id number] [facility] [id number]    RE: Melissa MCADAMS Dominic       Dear Colleague,    I had the pleasure of seeing Melissa Alfaro in the [facility].    HPI and Plan:   Today I had the pleasure of seeing Melissa Alfaro at [facility] in [place]. She is a pleasant 85 year old patient with a past medical history of COPD, hypertension, paroxysmal atrial fibrillation with chads vas score of 4 and hyperlipidemia who I initially saw in the hospital on 8/9/2019 for paroxysmal atrial fibrillation.  The patient had presented to the hospital for abdominal pain and underwent small bowel resection for adhesions and ischemia.  In the perioperative period she was found to be in A. fib with RVR and hence a cardiology consult was called.  She later converted spontaneously to sinus rhythm.  Given that he was disoriented during the hospital encounter I deferred the discussion about AC to outpatient visit.      When I saw her last time she was still recovering following surgery and her hemoglobin had not completely gone back to normal.  I hence recommended that she complete a work-up for anemia before considering AC.  I also ordered a 2-week ZIO patch which was completed in the interim.  The ZIO Patch showed 50 short runs of SVT the longest of which lasted 10 seconds.  There were no episodes of atrial fibrillation noted on the ZIO Patch.  The patient reports that he has been doing well and feels back to her baseline.  Her last hemoglobin was checked a month ago and was still 9.2.  She has not been seen by a GI physician or the GI surgeon who operated on her.  I discussed with her son that this should be looked into a little more.    She had an echocardiogram at an outside center on 4/17/2018 which showed normal biventricular size and function.  EF was 65 to 70% and the left atrium was moderate-severely dilated.There was mild MR  and normal pulmonary pressure.   However, the echocardiogram done in our system on 8/20/2018 showed normal LV size and function with a normal sized left atrium. She also had a stress test in 2013 which was negative for ischemia.    Assessment plan  1.  Paroxysmal atrial fibrillation with chads vascular score of 4, age, gender, hypertension  2.  Hypertension  3.  Hyperlipidemia  4.  SBO, ischemic bowel status post bowel resection  5. Anemia     At this point given that the patient has no episode of atrial fibrillation on the 2-week ZIO Patch and that I believe that the episode that occurred in the hospital was most likely in the setting of SBO and recent surgery.  Moreover, the echocardiogram showed normal-sized left atrium.  She is currently on a beta-blocker and I will continue that unchanged.  As far as anticoagulation is concerned given that she is in sinus rhythm with no burden of A. fib on the recent ZIO Patch I do not feel very strongly about starting her on a blood thinner.  This is also because she has anemia and her hemoglobin has continued to be low since her abdominal surgery.  The patient and her daughter tells me that they are not very keen on starting anticoagulation but anyways.  I  will see her in a year and repeat cardiac monitoring for 2 weeks at that time.  If found to have atrial fibrillation I will reconsider anti-coagulation.  I have told her to follow-up with her primary care physician and if possible a Gi physician to undergo work-up of anemia.    Plan  1.  Continue current medications  2.  Follow-up with me in 12 months with a ZIO Patch before    Thank you for allowing me to participate in the care of Melissa Llanes MD  Cardiology    Orders Placed This Encounter   Procedures     Follow-Up with Cardiologist       Encounter Diagnosis   Name Primary?     Paroxysmal atrial fibrillation (H)        CURRENT MEDICATIONS:  Current Outpatient Medications   Medication Sig Dispense  Refill     ACETAMINOPHEN PO Take 650 mg by mouth every 6 hours as needed for pain        amLODIPine (NORVASC) 10 MG tablet Take 10 mg by mouth daily       arformoterol (BROVANA) 15 MCG/2ML NEBU neb solution Take 15 mcg by nebulization 2 times daily       atenolol (TENORMIN) 50 MG tablet Take 50 mg by mouth daily       budesonide (PULMICORT) 0.5 MG/2ML neb solution Take 0.5 mg by nebulization 2 times daily       tiotropium (SPIRIVA) 18 MCG capsule Inhale 18 mcg into the lungs daily        vitamin D3 (CHOLECALCIFEROL) 29325 units capsule Take 50,000 Units by mouth once a week On Monday.       albuterol (PROAIR HFA/PROVENTIL HFA/VENTOLIN HFA) 108 (90 Base) MCG/ACT Inhaler Inhale 2 puffs into the lungs every 4 hours as needed for shortness of breath / dyspnea or wheezing        glycerin-hypromellose- (ARTIFICIAL TEARS) 0.2-0.2-1 % SOLN ophthalmic solution Place 1 drop into both eyes 4 times daily as needed        zinc oxide (DESITIN) 20 % external ointment Apply topically every hour as needed for irritation To gluteal skin irritation (Patient not taking: Reported on 9/26/2019)         ALLERGIES     Allergies   Allergen Reactions     No Clinical Screening - See Comments Shortness Of Breath     With some mold spores  Other reaction(s): Other  Heart races with antihistamines     Diphtheria-Tetanus Toxoids      Other reaction(s): Other  Red swollen arm     Epinephrine      Other reaction(s): Other  Heart rate increases & her body shakes & trembles     Penicillin G      Other reaction(s): Other (Specify in Comments)       PAST MEDICAL HISTORY:  Past Medical History:   Diagnosis Date     Anxiety      Benign neoplasm of colon      Carotid stenosis, asymptomatic      COPD (chronic obstructive pulmonary disease) (H)      Corns and callosities      Dermatophytosis of nail      Essential hypertension      History of atrial fibrillation      Hyperlipidemia      Hyperlipidemia      Insomnia      Irritable bowel syndrome       Nocturia      Plantar fascial fibromatosis      Primary localized osteoarthrosis of the hip      Pulmonary hypertension (H)      Sprain of lumbar region      Vitamin D deficiency      VSD (ventricular septal defect and aortic arch hypoplasia        PAST SURGICAL HISTORY:  Past Surgical History:   Procedure Laterality Date     APPENDECTOMY       AS TOTAL HIP ARTHROPLASTY       COLECTOMY WITHOUT COLOSTOMY N/A 8/7/2019    Procedure: EXPLORATORY LAPAROTOMY, LYSIS OF ADHESION, SMALL BOWEL RESECTION;  Surgeon: Avani Valente MD;  Location: [facility] OR       FAMILY HISTORY:  Family History   Problem Relation Age of Onset     Chronic Obstructive Pulmonary Disease Mother      Cerebrovascular Disease Mother      Cancer Father      Heart Disease Father      Seizure Disorder Father      Osteoporosis Sister      Lung Cancer Brother        SOCIAL HISTORY:  Social History     Socioeconomic History     Marital status: Single     Spouse name: None     Number of children: None     Years of education: None     Highest education level: None   Occupational History     None   Social Needs     Financial resource strain: None     Food insecurity:     Worry: None     Inability: None     Transportation needs:     Medical: None     Non-medical: None   Tobacco Use     Smoking status: Former Smoker     Smokeless tobacco: Never Used   Substance and Sexual Activity     Alcohol use: None     Comment: rarely     Drug use: None     Sexual activity: None   Lifestyle     Physical activity:     Days per week: None     Minutes per session: None     Stress: None   Relationships     Social connections:     Talks on phone: None     Gets together: None     Attends Anabaptist service: None     Active member of club or organization: None     Attends meetings of clubs or organizations: None     Relationship status: None     Intimate partner violence:     Fear of current or ex partner: None     Emotionally abused: None     Physically abused: None     Forced sexual  "activity: None   Other Topics Concern     Parent/sibling w/ CABG, MI or angioplasty before 65F 55M? Not Asked   Social History Narrative     None       Review of Systems:  Skin:  Negative       Eyes:  Positive for glasses    ENT:  Negative      Respiratory:  Negative       Cardiovascular:         Gastroenterology: Negative      Genitourinary:  Negative      Musculoskeletal:  Positive for arthritis fingers  Neurologic:  Negative      Psychiatric:  Negative      Heme/Lymph/Imm:  Negative      Endocrine:  Negative        Physical Exam:  Vitals: /60   Pulse 64   Ht 1.549 m (5' 1\")   Wt 41.7 kg (91 lb 14.4 oz)   BMI 17.36 kg/m     Constitutional: awake, alert, no distress  Skin: Warm and dry to touch  Head: Normocephalic, atraumatic  Eyes: Conjunctivae and lids unremarkable, sclera white  ENT: No pallor or cyanosis  Respiratory: Normal breath sounds, clear to auscultation  Cardiac: Regular rate and rhythm, S1-S2 normal.  No murmurs gallops or rubs.   No pedal edema.   Extremities and musculoskeletal: No gross motor deficit  Neurological.  Affect normal  Psych: Alert and oriented x3    Recent Lab Results:  LIPID RESULTS:  No results found for: CHOL, HDL, LDL, TRIG, CHOLHDLRATIO    LIVER ENZYME RESULTS:  Lab Results   Component Value Date    AST 22 08/04/2019    ALT 21 08/04/2019       CBC RESULTS:  Lab Results   Component Value Date    WBC 6.2 08/13/2019    RBC 2.89 (L) 08/13/2019    HGB 9.2 (L) 08/21/2019    HCT 25.8 (L) 08/13/2019    MCV 89 08/13/2019    MCH 28.7 08/13/2019    MCHC 32.2 08/13/2019    RDW 14.8 08/13/2019     08/13/2019       BMP RESULTS:  Lab Results   Component Value Date     08/21/2019    POTASSIUM 3.6 08/21/2019    CHLORIDE 115 (H) 08/21/2019    CO2 22 08/21/2019    ANIONGAP 7 08/21/2019    GLC 77 08/21/2019    BUN 15 08/21/2019    CR 1.02 08/21/2019    GFRESTIMATED 50 (L) 08/21/2019    GFRESTBLACK 58 (L) 08/21/2019    LUIS ENRIQUE 8.6 08/21/2019        A1C RESULTS:  No results found " for: A1C    INR RESULTS:  No results found for: INR    CC  Donald Mittal, THOMAS  [address] [place] [address]    All medical records were reviewed in detail and discussed with the patient. Greater than 30 mins were spent with the patient, 50% of this time was spent on counseling and coordination of care.  After visit summary was printed and given to the patient.      Thank you for allowing me to participate in the care of your patient.      Sincerely,     Mike Llanes MD     [facility]

## 2022-10-05 NOTE — PROGRESS NOTES
SW:    I: SUMAYA following for discharge planning. Pt accepted at [facility] for TCU placement. Pt has BCBS Medicare advantage and therefore will need prior authorization before discharge to a TCU. SUMAYA completed prior auth request and faxed supporting documentation to [employer] ([phone]). Will await response.    P: SW to follow. Discharge to [facility] when medically appropriate and prior auth received from insurance.    AN Martinez, [facility] HospitalSW  Daytime (8:00am-4:30pm): [phone]  After-Hours SUMAYA Pager (4:30pm-11:30pm): [phone]      negative

## 2023-05-26 NOTE — PROGRESS NOTES
CLINICAL NUTRITION SERVICES - REASSESSMENT NOTE      Recommendations Ordered by Registered Dietitian (RD):     Will send a Boost at 10am and 2pm (higher in protein than a shake)     Malnutrition: (8/5)  % Weight Loss:  > 2% in 1 week (severe malnutrition)  % Intake:  Decreased intake does not meet criteria for malnutrition (has been 4 days)  Subcutaneous Fat Loss:  None observed  Muscle Loss:  Clavicle bone region  - mild depletion and Acromion bone region  - mild depletion  Fluid Retention:  None noted     Malnutrition Diagnosis: Non-Severe malnutrition  In Context of:  Acute illness or injury        EVALUATION OF PROGRESS TOWARD GOALS   Diet:    Regular  Room Service with Assist  Boost shake at 10am and 2pm      Intake/Tolerance:    8/11: Clear liquid ---> full liquid  8/12: Regular    Visited with pt this morning   Couldn't really remember what she ate for breakfast (was sent oatmeal, milk and tea - note tea and milk sitting on table)  States that she likes the Boost supplements  Was going to sit up and start drinking the Boost that was just delivered    NEW FINDINGS:   Loose stools overnight - improving today  Working on TCU placement    Previous Goals (8/9):   Pt to receive nutrition in the next 2-3 days  Evaluation: Met    Previous Nutrition Diagnosis (8/9):   Inadequate protein-energy intake related to day 6 NPO status as evidenced by pt not meeting est nutrition needs  Evaluation: Improving      CURRENT NUTRITION DIAGNOSIS  Increased nutrient needs (protein) related to higher demand for post-op healing as evidenced by pt is s/p exploratory lap with lysis of adhesions and resection of small bowel on 8/7/19    INTERVENTIONS  Recommendations / Nutrition Prescription  Regular diet  Nutrition supplements    Implementation  Will send a Boost at 10am and 2pm (higher in protein than a shake)    Goals  Pt to consume 100% of the Boost being sent      MONITORING AND EVALUATION:  Progress towards goals will be monitored  and evaluated per protocol and Practice Guidelines         Size Of Lesion In Cm (Optional): 0 Procedure To Be Performed At Next Visit: Biopsy by punch method Introduction Text (Please End With A Colon): The following procedure was deferred: Detail Level: Detailed

## 2024-12-18 NOTE — PROGRESS NOTES
Some blood  SD.  On  subcutaneous heparin. Hgb did  Not drop in a significant fashion. Will hold heparin for a day or two. Have her walk. Advance diet   Bill For Surgical Tray: no Expected Date Of Service: 09/06/2024 Billing Type: Third-Party Bill

## 2025-04-03 ENCOUNTER — LAB REQUISITION (OUTPATIENT)
Dept: LAB | Facility: CLINIC | Age: OVER 89
End: 2025-04-03
Payer: COMMERCIAL

## 2025-04-03 DIAGNOSIS — G30.1 ALZHEIMER'S DISEASE WITH LATE ONSET (CODE) (H): ICD-10-CM

## 2025-04-03 DIAGNOSIS — J44.9 CHRONIC OBSTRUCTIVE PULMONARY DISEASE, UNSPECIFIED (H): ICD-10-CM

## 2025-04-03 DIAGNOSIS — I73.9 PERIPHERAL VASCULAR DISEASE, UNSPECIFIED: ICD-10-CM

## 2025-04-03 DIAGNOSIS — F02.B0 DEMENTIA IN OTHER DISEASES CLASSIFIED ELSEWHERE, MODERATE, WITHOUT BEHAVIORAL DISTURBANCE, PSYCHOTIC DISTURBANCE, MOOD DISTURBANCE, AND ANXIETY (H): ICD-10-CM

## 2025-04-03 DIAGNOSIS — Z90.49 ACQUIRED ABSENCE OF OTHER SPECIFIED PARTS OF DIGESTIVE TRACT: ICD-10-CM

## 2025-04-03 DIAGNOSIS — D68.69 OTHER THROMBOPHILIA: ICD-10-CM

## 2025-04-03 DIAGNOSIS — Z71.89 OTHER SPECIFIED COUNSELING: ICD-10-CM

## 2025-04-03 DIAGNOSIS — D69.2 OTHER NONTHROMBOCYTOPENIC PURPURA: ICD-10-CM

## 2025-04-03 DIAGNOSIS — F39 UNSPECIFIED MOOD (AFFECTIVE) DISORDER: ICD-10-CM

## 2025-04-03 DIAGNOSIS — I48.20 CHRONIC ATRIAL FIBRILLATION, UNSPECIFIED (H): ICD-10-CM

## 2025-04-03 DIAGNOSIS — I11.0 HYPERTENSIVE HEART DISEASE WITH HEART FAILURE (H): ICD-10-CM

## 2025-04-03 DIAGNOSIS — I48.91 UNSPECIFIED ATRIAL FIBRILLATION (H): ICD-10-CM

## 2025-04-09 LAB
ALBUMIN SERPL BCG-MCNC: 4.1 G/DL (ref 3.5–5.2)
ALP SERPL-CCNC: 47 U/L (ref 40–150)
ALT SERPL W P-5'-P-CCNC: 34 U/L (ref 0–50)
ANION GAP SERPL CALCULATED.3IONS-SCNC: 13 MMOL/L (ref 7–15)
AST SERPL W P-5'-P-CCNC: 34 U/L (ref 0–45)
BILIRUB SERPL-MCNC: 0.5 MG/DL
BUN SERPL-MCNC: 24.9 MG/DL (ref 8–23)
CALCIUM SERPL-MCNC: 9.4 MG/DL (ref 8.8–10.4)
CHLORIDE SERPL-SCNC: 89 MMOL/L (ref 98–107)
CREAT SERPL-MCNC: 1.01 MG/DL (ref 0.51–0.95)
EGFRCR SERPLBLD CKD-EPI 2021: 52 ML/MIN/1.73M2
ERYTHROCYTE [DISTWIDTH] IN BLOOD BY AUTOMATED COUNT: 13.2 % (ref 10–15)
GLUCOSE SERPL-MCNC: 120 MG/DL (ref 70–99)
HCO3 SERPL-SCNC: 29 MMOL/L (ref 22–29)
HCT VFR BLD AUTO: 34.5 % (ref 35–47)
HGB BLD-MCNC: 10.9 G/DL (ref 11.7–15.7)
MCH RBC QN AUTO: 29.2 PG (ref 26.5–33)
MCHC RBC AUTO-ENTMCNC: 31.6 G/DL (ref 31.5–36.5)
MCV RBC AUTO: 93 FL (ref 78–100)
PLATELET # BLD AUTO: 228 10E3/UL (ref 150–450)
POTASSIUM SERPL-SCNC: 3.8 MMOL/L (ref 3.4–5.3)
PROT SERPL-MCNC: 6.6 G/DL (ref 6.4–8.3)
RBC # BLD AUTO: 3.73 10E6/UL (ref 3.8–5.2)
SODIUM SERPL-SCNC: 131 MMOL/L (ref 135–145)
TSH SERPL DL<=0.005 MIU/L-ACNC: 0.95 UIU/ML (ref 0.3–4.2)
VIT B12 SERPL-MCNC: 247 PG/ML (ref 232–1245)
WBC # BLD AUTO: 18.8 10E3/UL (ref 4–11)

## 2025-04-09 PROCEDURE — P9604 ONE-WAY ALLOW PRORATED TRIP: HCPCS | Mod: ORL | Performed by: PHYSICIAN ASSISTANT

## 2025-04-09 PROCEDURE — 85027 COMPLETE CBC AUTOMATED: CPT | Mod: ORL | Performed by: PHYSICIAN ASSISTANT

## 2025-04-09 PROCEDURE — 84443 ASSAY THYROID STIM HORMONE: CPT | Mod: ORL | Performed by: PHYSICIAN ASSISTANT

## 2025-04-09 PROCEDURE — 36415 COLL VENOUS BLD VENIPUNCTURE: CPT | Mod: ORL | Performed by: PHYSICIAN ASSISTANT

## 2025-04-09 PROCEDURE — 80053 COMPREHEN METABOLIC PANEL: CPT | Mod: ORL | Performed by: PHYSICIAN ASSISTANT

## 2025-04-09 PROCEDURE — 82607 VITAMIN B-12: CPT | Mod: ORL | Performed by: PHYSICIAN ASSISTANT

## (undated) DEVICE — COVER CLAMP FABRIC RADIOPAQUE 9.5X150MM 072002PBX

## (undated) DEVICE — ESU GROUND PAD UNIVERSAL W/O CORD

## (undated) DEVICE — ESU ELEC BLADE 6" COATED E1450-6

## (undated) DEVICE — PREP CHLORAPREP 26ML TINTED ORANGE  260815

## (undated) DEVICE — SU VICRYL 3-0 SH CR 8X18" J774

## (undated) DEVICE — CATH TRAY FOLEY SURESTEP 16FR W/URNE MTR STLK LATEX A303316A

## (undated) DEVICE — STPL RELOAD LINEAR CUT 75MM TCR75

## (undated) DEVICE — SU VICRYL 3-0 TIE 12X18" J904T

## (undated) DEVICE — SUCTION CANISTER MEDIVAC LINER 3000ML W/LID 65651-530

## (undated) DEVICE — SU VICRYL 3-0 SH 27" J316H

## (undated) DEVICE — LINEN TOWEL PACK X5 5464

## (undated) DEVICE — NDL 22GA 1.5"

## (undated) DEVICE — Device

## (undated) DEVICE — SU PDS II 0 CTX 60" Z990G

## (undated) DEVICE — STPL LINEAR CUT 75MM TLC75

## (undated) DEVICE — SU VICRYL 2-0 TIE 12X18" J905T

## (undated) DEVICE — SOL WATER IRRIG 1000ML BOTTLE 2F7114

## (undated) DEVICE — BLADE KNIFE SURG 15 371115

## (undated) DEVICE — ESU LIGASURE IMPACT OPEN SEALER/DVDR CVD LG JAW LF4418

## (undated) DEVICE — DRSG GAUZE 4X4" 3033

## (undated) DEVICE — ESU PENCIL W/HOLSTER E2350H

## (undated) DEVICE — SU VICRYL 0 TIE 12X18" J906G

## (undated) DEVICE — PACK MAJOR SBA15MAFSI

## (undated) DEVICE — GLOVE PROTEXIS BLUE W/NEU-THERA 6.5  2D73EB65

## (undated) DEVICE — GLOVE PROTEXIS MICRO 6.0  2D73PM60

## (undated) RX ORDER — FENTANYL CITRATE 50 UG/ML
INJECTION, SOLUTION INTRAMUSCULAR; INTRAVENOUS
Status: DISPENSED
Start: 2019-08-07

## (undated) RX ORDER — HYDROMORPHONE HYDROCHLORIDE 1 MG/ML
INJECTION, SOLUTION INTRAMUSCULAR; INTRAVENOUS; SUBCUTANEOUS
Status: DISPENSED
Start: 2019-08-07